# Patient Record
Sex: MALE | Race: WHITE | NOT HISPANIC OR LATINO
[De-identification: names, ages, dates, MRNs, and addresses within clinical notes are randomized per-mention and may not be internally consistent; named-entity substitution may affect disease eponyms.]

---

## 2021-01-02 ENCOUNTER — TRANSCRIPTION ENCOUNTER (OUTPATIENT)
Age: 56
End: 2021-01-02

## 2021-09-29 ENCOUNTER — TRANSCRIPTION ENCOUNTER (OUTPATIENT)
Age: 56
End: 2021-09-29

## 2021-11-23 ENCOUNTER — INPATIENT (INPATIENT)
Facility: HOSPITAL | Age: 56
LOS: 1 days | Discharge: HOME | DRG: 432 | End: 2021-11-25
Attending: HOSPITALIST | Admitting: HOSPITALIST
Payer: COMMERCIAL

## 2021-11-23 VITALS — HEIGHT: 74 IN | WEIGHT: 270.07 LBS

## 2021-11-23 DIAGNOSIS — R09.89 OTHER SPECIFIED SYMPTOMS AND SIGNS INVOLVING THE CIRCULATORY AND RESPIRATORY SYSTEMS: ICD-10-CM

## 2021-11-23 DIAGNOSIS — R18.8 OTHER ASCITES: ICD-10-CM

## 2021-11-23 LAB
ALBUMIN SERPL ELPH-MCNC: 2.4 G/DL — LOW (ref 3.3–5)
ALP SERPL-CCNC: 170 U/L — HIGH (ref 40–120)
ALT FLD-CCNC: 45 U/L — SIGNIFICANT CHANGE UP (ref 12–78)
ANION GAP SERPL CALC-SCNC: 5 MMOL/L — SIGNIFICANT CHANGE UP (ref 5–17)
APTT BLD: 39.7 SEC — HIGH (ref 27.5–35.5)
AST SERPL-CCNC: 74 U/L — HIGH (ref 15–37)
BASOPHILS # BLD AUTO: 0 K/UL — SIGNIFICANT CHANGE UP (ref 0–0.2)
BASOPHILS NFR BLD AUTO: 0 % — SIGNIFICANT CHANGE UP (ref 0–2)
BILIRUB SERPL-MCNC: 3.6 MG/DL — HIGH (ref 0.2–1.2)
BUN SERPL-MCNC: 18 MG/DL — SIGNIFICANT CHANGE UP (ref 7–23)
CALCIUM SERPL-MCNC: 8 MG/DL — LOW (ref 8.5–10.1)
CHLORIDE SERPL-SCNC: 108 MMOL/L — SIGNIFICANT CHANGE UP (ref 96–108)
CO2 SERPL-SCNC: 28 MMOL/L — SIGNIFICANT CHANGE UP (ref 22–31)
CREAT SERPL-MCNC: 0.83 MG/DL — SIGNIFICANT CHANGE UP (ref 0.5–1.3)
EOSINOPHIL # BLD AUTO: 0 K/UL — SIGNIFICANT CHANGE UP (ref 0–0.5)
EOSINOPHIL NFR BLD AUTO: 0 % — SIGNIFICANT CHANGE UP (ref 0–6)
GLUCOSE SERPL-MCNC: 145 MG/DL — HIGH (ref 70–99)
HCT VFR BLD CALC: 40.9 % — SIGNIFICANT CHANGE UP (ref 39–50)
HGB BLD-MCNC: 14.1 G/DL — SIGNIFICANT CHANGE UP (ref 13–17)
INR BLD: 1.72 RATIO — HIGH (ref 0.88–1.16)
LYMPHOCYTES # BLD AUTO: 1.37 K/UL — SIGNIFICANT CHANGE UP (ref 1–3.3)
LYMPHOCYTES # BLD AUTO: 28 % — SIGNIFICANT CHANGE UP (ref 13–44)
MCHC RBC-ENTMCNC: 34.5 GM/DL — SIGNIFICANT CHANGE UP (ref 32–36)
MCHC RBC-ENTMCNC: 34.6 PG — HIGH (ref 27–34)
MCV RBC AUTO: 100.5 FL — HIGH (ref 80–100)
MONOCYTES # BLD AUTO: 0.24 K/UL — SIGNIFICANT CHANGE UP (ref 0–0.9)
MONOCYTES NFR BLD AUTO: 5 % — SIGNIFICANT CHANGE UP (ref 2–14)
NEUTROPHILS # BLD AUTO: 3.27 K/UL — SIGNIFICANT CHANGE UP (ref 1.8–7.4)
NEUTROPHILS NFR BLD AUTO: 67 % — SIGNIFICANT CHANGE UP (ref 43–77)
NRBC # BLD: SIGNIFICANT CHANGE UP /100 WBCS (ref 0–0)
PLATELET # BLD AUTO: 77 K/UL — LOW (ref 150–400)
POTASSIUM SERPL-MCNC: 3.8 MMOL/L — SIGNIFICANT CHANGE UP (ref 3.5–5.3)
POTASSIUM SERPL-SCNC: 3.8 MMOL/L — SIGNIFICANT CHANGE UP (ref 3.5–5.3)
PROT SERPL-MCNC: 6.7 GM/DL — SIGNIFICANT CHANGE UP (ref 6–8.3)
PROTHROM AB SERPL-ACNC: 19.4 SEC — HIGH (ref 10.6–13.6)
RBC # BLD: 4.07 M/UL — LOW (ref 4.2–5.8)
RBC # FLD: 15.2 % — HIGH (ref 10.3–14.5)
SARS-COV-2 RNA SPEC QL NAA+PROBE: SIGNIFICANT CHANGE UP
SODIUM SERPL-SCNC: 141 MMOL/L — SIGNIFICANT CHANGE UP (ref 135–145)
TROPONIN I, HIGH SENSITIVITY RESULT: 30.02 NG/L — SIGNIFICANT CHANGE UP
WBC # BLD: 4.88 K/UL — SIGNIFICANT CHANGE UP (ref 3.8–10.5)
WBC # FLD AUTO: 4.88 K/UL — SIGNIFICANT CHANGE UP (ref 3.8–10.5)

## 2021-11-23 PROCEDURE — 82728 ASSAY OF FERRITIN: CPT

## 2021-11-23 PROCEDURE — 74183 MRI ABD W/O CNTR FLWD CNTR: CPT

## 2021-11-23 PROCEDURE — 36415 COLL VENOUS BLD VENIPUNCTURE: CPT

## 2021-11-23 PROCEDURE — 99223 1ST HOSP IP/OBS HIGH 75: CPT

## 2021-11-23 PROCEDURE — 86039 ANTINUCLEAR ANTIBODIES (ANA): CPT

## 2021-11-23 PROCEDURE — 80053 COMPREHEN METABOLIC PANEL: CPT

## 2021-11-23 PROCEDURE — 83550 IRON BINDING TEST: CPT

## 2021-11-23 PROCEDURE — 99285 EMERGENCY DEPT VISIT HI MDM: CPT

## 2021-11-23 PROCEDURE — 87075 CULTR BACTERIA EXCEPT BLOOD: CPT

## 2021-11-23 PROCEDURE — 84134 ASSAY OF PREALBUMIN: CPT

## 2021-11-23 PROCEDURE — 49083 ABD PARACENTESIS W/IMAGING: CPT

## 2021-11-23 PROCEDURE — 87102 FUNGUS ISOLATION CULTURE: CPT

## 2021-11-23 PROCEDURE — 93306 TTE W/DOPPLER COMPLETE: CPT

## 2021-11-23 PROCEDURE — 93010 ELECTROCARDIOGRAM REPORT: CPT

## 2021-11-23 PROCEDURE — A9579: CPT

## 2021-11-23 PROCEDURE — 83516 IMMUNOASSAY NONANTIBODY: CPT

## 2021-11-23 PROCEDURE — 93975 VASCULAR STUDY: CPT | Mod: 26

## 2021-11-23 PROCEDURE — 74177 CT ABD & PELVIS W/CONTRAST: CPT | Mod: 26,MA

## 2021-11-23 PROCEDURE — 82107 ALPHA-FETOPROTEIN L3: CPT

## 2021-11-23 PROCEDURE — 93975 VASCULAR STUDY: CPT

## 2021-11-23 PROCEDURE — 71045 X-RAY EXAM CHEST 1 VIEW: CPT | Mod: 26

## 2021-11-23 PROCEDURE — 83540 ASSAY OF IRON: CPT

## 2021-11-23 PROCEDURE — 80061 LIPID PANEL: CPT

## 2021-11-23 PROCEDURE — 83520 IMMUNOASSAY QUANT NOS NONAB: CPT

## 2021-11-23 PROCEDURE — C1729: CPT

## 2021-11-23 PROCEDURE — 85027 COMPLETE CBC AUTOMATED: CPT

## 2021-11-23 PROCEDURE — 86255 FLUORESCENT ANTIBODY SCREEN: CPT

## 2021-11-23 PROCEDURE — 87070 CULTURE OTHR SPECIMN AEROBIC: CPT

## 2021-11-23 PROCEDURE — 86803 HEPATITIS C AB TEST: CPT

## 2021-11-23 PROCEDURE — 86769 SARS-COV-2 COVID-19 ANTIBODY: CPT

## 2021-11-23 RX ORDER — ACETAMINOPHEN 500 MG
650 TABLET ORAL EVERY 6 HOURS
Refills: 0 | Status: DISCONTINUED | OUTPATIENT
Start: 2021-11-23 | End: 2021-11-25

## 2021-11-23 RX ORDER — ONDANSETRON 8 MG/1
4 TABLET, FILM COATED ORAL EVERY 8 HOURS
Refills: 0 | Status: DISCONTINUED | OUTPATIENT
Start: 2021-11-23 | End: 2021-11-25

## 2021-11-23 RX ORDER — FUROSEMIDE 40 MG
40 TABLET ORAL DAILY
Refills: 0 | Status: DISCONTINUED | OUTPATIENT
Start: 2021-11-23 | End: 2021-11-24

## 2021-11-23 RX ORDER — LANOLIN ALCOHOL/MO/W.PET/CERES
3 CREAM (GRAM) TOPICAL AT BEDTIME
Refills: 0 | Status: DISCONTINUED | OUTPATIENT
Start: 2021-11-23 | End: 2021-11-25

## 2021-11-23 RX ADMIN — Medication 40 MILLIGRAM(S): at 19:11

## 2021-11-23 NOTE — PATIENT PROFILE ADULT - NSPROMEDSADMININFO_GEN_A_NUR
----- Message from Km Mcpherson MD sent at 5/7/2019 12:33 PM CDT -----  Call. CMP normal. INR low, I know he was just instructed to restart the warfarin yesterday. Recheck again in 1 week.   no concerns

## 2021-11-23 NOTE — PHARMACOTHERAPY INTERVENTION NOTE - COMMENTS
Medication reconciliation completed.  Reviewed Medication list and confirmed med allergies with patient; confirmed with Dr. [facility].  Pt confirms he takes no medication at home.

## 2021-11-23 NOTE — ED STATDOCS - NS_ ATTENDINGSCRIBEDETAILS _ED_A_ED_FT
I, Ludy Courtney MD, performed the initial face to face bedside interview with this patient regarding history of present illness and determined that the patient should be seen in the main ED.  The history, was documented by the scribe in my presence and I attest to the accuracy of the documentation.

## 2021-11-23 NOTE — H&P ADULT - HISTORY OF PRESENT ILLNESS
57 yo M with pmh alcoholic fatty liver, h/o drinking beer on a daily basis for last 30 years presents from Dr millan office after he endorsed abdominal distention, thigh high edema bilaterally, 40 pound wt gain in one month, scrotal edema and sob. PT states he was told to abstain from drinking after he was told he had hepatosplenomegaly back in August of 2020. Last drink was 10/31/21. Denies fevers or chills. Afebrile in ED. No leukocytosis. No abd pain however endorses fatigue. Scleral icterus noted, states noticed this change 1 day ago. No pruritis. No h/o AI disease in patient or first degree relatives.   CTAP in ED showed large ascites with moderate L pleural effusion and questionable partial thrombosis of portal vein. LFTS noted  Denies h/o ivda or transfusions. No tattoos        PAST MEDICAL/SURGICAL/FAMILY/SOCIAL HISTORY:    Past Medical, Past Surgical, and Family History:  PAST MEDICAL HISTORY:  Fatty liver.  ETOH abuse    Shx: none  Social: h/o of drinking beer daily for 30 + years. No tobacco. Smokes MJ  Fhx: no fhx of liver disease

## 2021-11-23 NOTE — ED ADULT NURSE NOTE - NS ED NURSE REPORT GIVEN DT
Detail Level: Zone 23-Nov-2021 22:03 Quality 226: Preventive Care And Screening: Tobacco Use: Screening And Cessation Intervention: Tobacco Screening not Performed for Unknown Reasons

## 2021-11-23 NOTE — ED STATDOCS - PROGRESS NOTE DETAILS
Erwin Reddy for attending Dr. Courtney: 55 y/o male with no significant PMHx presents to the ED sent by Dr. Melendez for unintentional weight gain and possible paracentesis. Pt also with EKG changes. Denies SOB, weakness. No other complaints at this time. Will send pt to main ED for further evaluation.

## 2021-11-23 NOTE — ED ADULT NURSE NOTE - OBJECTIVE STATEMENT
Pt presents to ED, A&Ox4, ambulatory w/o assistance at baseline, sent by MD for abnormal lab values and weight gain. Pt states he has noticed increased abdominal distension and b/l leg swelling [id number]. Abdomen currently distended and nontender to touch. Denies chest pain, shortness of breath, palpitations, diaphoresis, headaches, fevers, dizziness, nausea, vomiting, diarrhea, or urinary symptoms at this time. IV established in right arm with a 20G, labs drawn and sent, call bell in reach, warm blanket provided, bed in lowest position, side rails up x2, MD evaluation in progress. Will continue to monitor.

## 2021-11-23 NOTE — H&P ADULT - NSHPPHYSICALEXAM_GEN_ALL_CORE
ICU Vital Signs Last 24 Hrs  T(C): 36.4 (23 Nov 2021 15:27), Max: 36.7 (23 Nov 2021 13:49)  T(F): 97.6 (23 Nov 2021 15:27), Max: 98.1 (23 Nov 2021 13:49)  HR: 88 (23 Nov 2021 15:27) (88 - 98)  BP: 154/78 (23 Nov 2021 15:27) (136/95 - 154/78)  BP(mean): 108 (23 Nov 2021 13:49) (108 - 108)  ABP: --  ABP(mean): --  RR: 16 (23 Nov 2021 15:27) (16 - 18)  SpO2: 100% (23 Nov 2021 15:27) (100% - 100%)      PHYSICAL EXAM:    Constitutional: NAD, awake and alert  HEENT: PERR, EOMI, Normal Hearing, MMM  Neck: Soft and supple, No LAD, No JVD  Respiratory: decreased BS to left lower field   Cardiovascular: S1 and S2, regular rate and rhythm, no Murmurs, gallops or rubs  Gastrointestinal: Bowel Sounds present, tense ascites with no fluid shift;   Extremities: +3 pitting thigh high edema with chronic venous changes b/l . + scrotal edema  Vascular: 2+ peripheral pulses  Neurological: A/O x 3, no focal deficits  Musculoskeletal: 5/5 strength b/l upper and lower extremities  Skin: No rashes

## 2021-11-23 NOTE — H&P ADULT - NSHPREVIEWOFSYSTEMS_GEN_ALL_CORE
REVIEW OF SYSTEMS:    CONSTITUTIONAL: No weakness, fevers or chills. + fatigue  EYES/ENT: No visual changes;  No vertigo or throat pain   NECK: No pain or stiffness  RESPIRATORY: No cough, wheezing, hemoptysis; No shortness of breath  CARDIOVASCULAR: No chest pain or palpitations  GASTROINTESTINAL: +abd distention. No nausea, vomiting, or hematemesis; No diarrhea or constipation. No melena or hematochezia.  GENITOURINARY: No dysuria, frequency or hematuria  NEUROLOGICAL: No numbness or weakness  SKIN: No itching, burning, rashes, or lesions ; + LE edema  All other review of systems is negative unless indicated above

## 2021-11-23 NOTE — ED PROVIDER NOTE - CARE PLAN
1 Principal Discharge DX:	Ascites  Secondary Diagnosis:	Transaminitis  Secondary Diagnosis:	Pleural effusion

## 2021-11-23 NOTE — ED ADULT NURSE REASSESSMENT NOTE - NS ED NURSE REASSESS COMMENT FT1
Pt. received from RAÚL Mata.  Pt. resting comfortably in bed, with no complaints at this time.  Awaiting covid result and admission. VSS.

## 2021-11-23 NOTE — H&P ADULT - ASSESSMENT
#Ascites  #Moderate Left pleural effusion  #Transaminitis/Jaundice  #ETOH abuse  #Thrombocytopenia  -admit to [facility]  -IR/GI consult for tense ascites requiring drainage  -Start lasix 40 ivp  -check prealbumin  -Hep panel, ASCA, anti-mitochondrial abs, KARLEY, indirect bili  -2D echo  -daily weights  -strict I/Os  -if no improvement of effusion with diuresis, may need thoracentesis    #?Portal vein thrombosis  -check Hep panel  -dedicated duplex of hepatic veins  -hold a/c for now until further GI eval is rendered (ie rule out any e/o esophageal varices)      DVT px: no chemical prophylaxis due to thrombocytopenia  Full code

## 2021-11-23 NOTE — ED PROVIDER NOTE - CLINICAL SUMMARY MEDICAL DECISION MAKING FREE TEXT BOX
Pt with large volume abdominal ascites, bilateral pleural effusion, question of partial occlusion portal vein, recommending eval with MRI or US.  US order was placed to eval for possible portal vein thrombosis.  Admit to medicine service for GI consult in AM.

## 2021-11-23 NOTE — H&P ADULT - NSHPLABSRESULTS_GEN_ALL_CORE
LABS: All Labs Reviewed:                        14.1   4.88  )-----------( 77       ( 23 Nov 2021 15:17 )             40.9     11-23    141  |  108  |  18  ----------------------------<  145<H>  3.8   |  28  |  0.83    Ca    8.0<L>      23 Nov 2021 15:17    TPro  6.7  /  Alb  2.4<L>  /  TBili  3.6<H>  /  DBili  x   /  AST  74<H>  /  ALT  45  /  AlkPhos  170<H>  11-23    PT/INR - ( 23 Nov 2021 15:17 )   PT: 19.4 sec;   INR: 1.72 ratio         PTT - ( 23 Nov 2021 15:17 )  PTT:39.7 sec          Blood Culture:         < from: CT Abdomen and Pelvis w/ IV Cont (11.23.21 @ 16:12) >    IMPRESSION:  Large abdominal ascites, moderate left pleural effusion and small right pleural effusion.  Suspected at least partial thrombosis of the proximal portal vein. Recommend further evaluation with MRI or ultrasound.    Findings were discussed with Dr. Lang 11/23/2021 4:26 PM by Dr. Diggs with read back confirmation.    --- End of Report ---    < end of copied text >

## 2021-11-23 NOTE — ED PROVIDER NOTE - OBJECTIVE STATEMENT
57 y/o M with a PMHx of alcoholic fatty liver presents to the ED from Dr. Melendez's office for abd distension and fatigue x October 2021. pt had an ultrasound and CT scan of abd in August 2020 that showed a fatty liver hepatomegaly and splenomegaly but no signs of cirrhosis. Pt had blood work around that time that showed elevated liver enzymes and thrombocytopenia . States that he started titrating his drinking down and stopped drinking  1 month ago. Denies any other complaints besides fatigue and abd distension.

## 2021-11-24 LAB
ALBUMIN SERPL ELPH-MCNC: 2 G/DL — LOW (ref 3.3–5)
ALP SERPL-CCNC: 141 U/L — HIGH (ref 40–120)
ALT FLD-CCNC: 35 U/L — SIGNIFICANT CHANGE UP (ref 12–78)
ANION GAP SERPL CALC-SCNC: 7 MMOL/L — SIGNIFICANT CHANGE UP (ref 5–17)
AST SERPL-CCNC: 61 U/L — HIGH (ref 15–37)
BAKER'S YEAST IGA QN IA: 33.7 UNITS — HIGH
BAKER'S YEAST IGA QN IA: ABNORMAL
BAKER'S YEAST IGG QN IA: 34.3 UNITS — HIGH
BAKER'S YEAST IGG QN IA: ABNORMAL
BILIRUB SERPL-MCNC: 3.1 MG/DL — HIGH (ref 0.2–1.2)
BUN SERPL-MCNC: 16 MG/DL — SIGNIFICANT CHANGE UP (ref 7–23)
CALCIUM SERPL-MCNC: 7.8 MG/DL — LOW (ref 8.5–10.1)
CHLORIDE SERPL-SCNC: 110 MMOL/L — HIGH (ref 96–108)
CHOLEST SERPL-MCNC: 174 MG/DL — SIGNIFICANT CHANGE UP
CO2 SERPL-SCNC: 25 MMOL/L — SIGNIFICANT CHANGE UP (ref 22–31)
COVID-19 NUCLEOCAPSID GAM AB INTERP: POSITIVE
COVID-19 NUCLEOCAPSID TOTAL GAM ANTIBODY RESULT: 15.3 INDEX — HIGH
COVID-19 SPIKE DOMAIN AB INTERP: POSITIVE
COVID-19 SPIKE DOMAIN ANTIBODY RESULT: >250 U/ML — HIGH
CREAT SERPL-MCNC: 0.57 MG/DL — SIGNIFICANT CHANGE UP (ref 0.5–1.3)
FERRITIN SERPL-MCNC: 623 NG/ML — HIGH (ref 30–400)
GLUCOSE SERPL-MCNC: 86 MG/DL — SIGNIFICANT CHANGE UP (ref 70–99)
GRAM STN FLD: SIGNIFICANT CHANGE UP
HAV IGM SER-ACNC: SIGNIFICANT CHANGE UP
HBV CORE IGM SER-ACNC: SIGNIFICANT CHANGE UP
HBV SURFACE AG SER-ACNC: SIGNIFICANT CHANGE UP
HCT VFR BLD CALC: 34.7 % — LOW (ref 39–50)
HCV AB S/CO SERPL IA: 0.3 S/CO — SIGNIFICANT CHANGE UP (ref 0–0.99)
HCV AB S/CO SERPL IA: 0.38 S/CO — SIGNIFICANT CHANGE UP (ref 0–0.99)
HCV AB SERPL-IMP: SIGNIFICANT CHANGE UP
HCV AB SERPL-IMP: SIGNIFICANT CHANGE UP
HDLC SERPL-MCNC: 51 MG/DL — SIGNIFICANT CHANGE UP
HGB BLD-MCNC: 12.1 G/DL — LOW (ref 13–17)
IRON SATN MFR SERPL: 117 UG/DL — SIGNIFICANT CHANGE UP (ref 45–165)
IRON SATN MFR SERPL: SIGNIFICANT CHANGE UP % (ref 16–55)
LIPID PNL WITH DIRECT LDL SERPL: 110 MG/DL — HIGH
MCHC RBC-ENTMCNC: 33.7 PG — SIGNIFICANT CHANGE UP (ref 27–34)
MCHC RBC-ENTMCNC: 34.9 GM/DL — SIGNIFICANT CHANGE UP (ref 32–36)
MCV RBC AUTO: 96.7 FL — SIGNIFICANT CHANGE UP (ref 80–100)
MITOCHONDRIA AB SER-ACNC: SIGNIFICANT CHANGE UP
MYELOPEROXIDASE AB SER-ACNC: <5 UNITS — SIGNIFICANT CHANGE UP
MYELOPEROXIDASE CELLS FLD QL: NEGATIVE — SIGNIFICANT CHANGE UP
NON HDL CHOLESTEROL: 123 MG/DL — SIGNIFICANT CHANGE UP
PLATELET # BLD AUTO: 69 K/UL — LOW (ref 150–400)
POTASSIUM SERPL-MCNC: 3.7 MMOL/L — SIGNIFICANT CHANGE UP (ref 3.5–5.3)
POTASSIUM SERPL-SCNC: 3.7 MMOL/L — SIGNIFICANT CHANGE UP (ref 3.5–5.3)
PREALB SERPL-MCNC: 10 MG/DL — LOW (ref 20–40)
PROT SERPL-MCNC: 5.5 GM/DL — LOW (ref 6–8.3)
RBC # BLD: 3.59 M/UL — LOW (ref 4.2–5.8)
RBC # FLD: 15.1 % — HIGH (ref 10.3–14.5)
SARS-COV-2 IGG+IGM SERPL QL IA: 15.3 INDEX — HIGH
SARS-COV-2 IGG+IGM SERPL QL IA: >250 U/ML — HIGH
SARS-COV-2 IGG+IGM SERPL QL IA: POSITIVE
SARS-COV-2 IGG+IGM SERPL QL IA: POSITIVE
SODIUM SERPL-SCNC: 142 MMOL/L — SIGNIFICANT CHANGE UP (ref 135–145)
SPECIMEN SOURCE: SIGNIFICANT CHANGE UP
TIBC SERPL-MCNC: SIGNIFICANT CHANGE UP UG/DL (ref 220–430)
TRIGL SERPL-MCNC: 65 MG/DL — SIGNIFICANT CHANGE UP
UIBC SERPL-MCNC: <20 UG/DL — LOW (ref 110–370)
WBC # BLD: 4.92 K/UL — SIGNIFICANT CHANGE UP (ref 3.8–10.5)
WBC # FLD AUTO: 4.92 K/UL — SIGNIFICANT CHANGE UP (ref 3.8–10.5)

## 2021-11-24 PROCEDURE — 93306 TTE W/DOPPLER COMPLETE: CPT | Mod: 26

## 2021-11-24 PROCEDURE — 99233 SBSQ HOSP IP/OBS HIGH 50: CPT

## 2021-11-24 RX ORDER — SPIRONOLACTONE 25 MG/1
100 TABLET, FILM COATED ORAL DAILY
Refills: 0 | Status: DISCONTINUED | OUTPATIENT
Start: 2021-11-25 | End: 2021-11-25

## 2021-11-24 RX ORDER — FUROSEMIDE 40 MG
40 TABLET ORAL DAILY
Refills: 0 | Status: DISCONTINUED | OUTPATIENT
Start: 2021-11-25 | End: 2021-11-25

## 2021-11-24 RX ADMIN — Medication 40 MILLIGRAM(S): at 09:08

## 2021-11-24 NOTE — CONSULT NOTE ADULT - ASSESSMENT
56-year-old man with alcoholic liver disease, cirrhosis, portal hypertension and ascites.  He does not currently seem to have significant hepatic encephalopathy.      Recommendations    -Follow-up ascites fluid studies  -Start Lasix 40 mg by mouth daily and Spironolactone 100 mg by mouth daily tomorrow  -2 g sodium diet  -Check alpha-fetoprotein  -f/u remaining serologies for liver disease  -Follow-up MRI of the liver to rule out portal vein thrombosis and evaluate for HCC  -He will need outpatient upper endoscopy to evaluate for varices  -He will need outpatient colonoscopy for screening purposes  -Alcohol cessation was discussed  -Will follow

## 2021-11-24 NOTE — CONSULT NOTE ADULT - ASSESSMENT
57yo M with ascites/left pleural effusion referred to IR for paracentesis  - Plan for IR paracentesis today  - Spoke with Dr. Carver/thoracic. If pt still SOB following paracentesis, may need drainage of pleural effusion or continued diuresis

## 2021-11-24 NOTE — CONSULT NOTE ADULT - ASSESSMENT
56y old Male with PMH alcoholic fatty liver, h/o drinking beer on a daily basis for last 30 years presented with ascities and pleural effusion  1. ascites seen by IR. will have therapeutic paracentesis  -continue lasix. may also need spironolactone  -GI consult  2. ? portal vein thrombosis: ultrasound of hepatic veins inconclusive and now will need MRI  -no anticoagulation in light of upcoming paracentesis  3. thrombocytopenia: most likely secondary to liver failure  4. pleural effusion: receiving lasix  -repeat cxr after paracentesis  -if effusion persists, will need thoracentesis    discussed with dr erazo 56y old Male with PMH alcoholic fatty liver, h/o drinking beer on a daily basis for last 30 years presented with ascities and pleural effusion  1. ascites seen by IR. will have therapeutic paracentesis  -continue lasix. may also need spironolactone  -GI consult  2. ? portal vein thrombosis: ultrasound of hepatic veins inconclusive and now will need MRI  -no anticoagulation in light of upcoming paracentesis  3. thrombocytopenia: most likely secondary to liver failure  4. pleural effusions: bilateral, more so on left. suggests systemic issues like portal htn rather than unilateral issue like cancer  - receiving lasix  -repeat cxr after paracentesis  -if effusion persists, will need thoracentesis    discussed with dr erazo

## 2021-11-24 NOTE — CONSULT NOTE ADULT - CONSULT REASON
57yo M with ascites/left pleural effusion referred to IR for paracentesis
pleural effusion
cirrhosis

## 2021-11-24 NOTE — CONSULT NOTE ADULT - SUBJECTIVE AND OBJECTIVE BOX
GI consult    HPI:      55 yo M with pmh alcoholic fatty liver, h/o drinking beer on a daily basis for last 30 years presents from Dr millan office after he endorsed abdominal distention, thigh high edema bilaterally, 40 pound wt gain in one month, scrotal edema and sob. PT states he was told to abstain from drinking after he was told he had hepatosplenomegaly back in August of 2020. Last drink was 10/31/21. Denies fevers or chills. Afebrile in ED. No leukocytosis. No abd pain however endorses fatigue. Scleral icterus noted, states noticed this change 1 day ago. No pruritis. No h/o AI disease in patient or first degree relatives.   CTAP in ED showed large ascites with moderate L pleural effusion and questionable partial thrombosis of portal vein. LFTS noted  Denies h/o ivda or transfusions. No tattoos    Called to evaluate patient for liver disease.  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  He was told he had liver disease in the past but stopped drinking only about 3 weeks ago or so.  He reports rapid weight gain recently and abdominal distention.  He was sent to the hospital for further evaluation by his primary doctor.  He denies fever, nausea, vomiting, abdominal pain, diarrhea or constipation.  There is no rectal bleeding.  He has not had a colonoscopy or endoscopy in the past.  He does have complaints of fatigue.  On imaging he was found to have a cirrhotic liver and ascites, with possible portal vein thrombosis.  He is pending MRI.  He underwent paracentesis today.    PAST MEDICAL/SURGICAL/FAMILY/SOCIAL HISTORY:    Past Medical, Past Surgical, and Family History:  PAST MEDICAL HISTORY:  Fatty liver.  ETOH abuse    Shx: none  Social: h/o of drinking beer daily for 30 + years. No tobacco. Smokes MJ  Fhx: no fhx of liver disease (23 Nov 2021 17:53)      Home Medications:  Fluzone Quadrivalent [id number] intramuscular suspension: 0.5 milliliter(s) intramuscular once  ***Dose given in October 2021*** (23 Nov 2021 17:00)  Pfizer-BioNTech COVID-19 Vaccine PF 30 mcg/0.3 mL intramuscular suspension: 0.3 milliliter(s) intramuscular once  ***3rd dose given in October 2021*** (23 Nov 2021 17:00)      MEDICATIONS  (STANDING):  furosemide   Injectable 40 milliGRAM(s) IV Push daily    MEDICATIONS  (PRN):  acetaminophen     Tablet .. 650 milliGRAM(s) Oral every 6 hours PRN Temp greater or equal to 38C (100.4F), Mild Pain (1 - 3)  aluminum hydroxide/magnesium hydroxide/simethicone Suspension 30 milliLiter(s) Oral every 4 hours PRN Dyspepsia  melatonin 3 milliGRAM(s) Oral at bedtime PRN Insomnia  ondansetron Injectable 4 milliGRAM(s) IV Push every 8 hours PRN Nausea and/or Vomiting      Allergies    No Known Allergies    Intolerances    ROS  As above  Otherwise unremarkable, all systems reviewed    PE:  Vital Signs Last 24 Hrs  T(C): 36.9 (24 Nov 2021 16:14), Max: 36.9 (23 Nov 2021 21:55)  T(F): 98.4 (24 Nov 2021 16:14), Max: 98.5 (23 Nov 2021 21:55)  HR: 83 (24 Nov 2021 16:14) (83 - 101)  BP: 127/73 (24 Nov 2021 16:14) (116/67 - 137/85)  BP(mean): 87 (23 Nov 2021 21:55) (87 - 87)  RR: 17 (24 Nov 2021 16:14) (16 - 18)  SpO2: 95% (24 Nov 2021 16:14) (93% - 100%)    Constitutional: NAD, well-developed, A+Ox3  mild icterus  +spider angiomata  no asterixis  Respiratory: CTABL, breathing comfortably  Cardiovascular: S1 and S2, RRR  Gastrointestinal: +BS, soft, non tender, moderately distended, no mass  Extremities: warm, well perfused, 2+ bilat LE edema with chronic skin changes  Psychiatric: Normal mood, normal affect  Neuro: moves all extremities, grossly intact  Skin: No rashes or lesions    LABS:                        12.1   4.92  )-----------( 69       ( 24 Nov 2021 05:35 )             34.7     11-24    142  |  110<H>  |  16  ----------------------------<  86  3.7   |  25  |  0.57    Ca    7.8<L>      24 Nov 2021 05:35    TPro  5.5<L>  /  Alb  2.0<L>  /  TBili  3.1<H>  /  DBili  x   /  AST  61<H>  /  ALT  35  /  AlkPhos  141<H>  11-24    PT/INR - ( 23 Nov 2021 15:17 )   PT: 19.4 sec;   INR: 1.72 ratio         PTT - ( 23 Nov 2021 15:17 )  PTT:39.7 sec  LIVER FUNCTIONS - ( 24 Nov 2021 05:35 )  Alb: 2.0 g/dL / Pro: 5.5 gm/dL / ALK PHOS: 141 U/L / ALT: 35 U/L / AST: 61 U/L / GGT: x             RADIOLOGY & ADDITIONAL STUDIES:  CT and US reviewed
Chief complaint:  Patient is a 56y old  Male who presents with a chief complaint of abd distention and wt ggain     HPI:  57 yo M with pmh alcoholic fatty liver, h/o drinking beer on a daily basis for last 30 years presents from Dr millan office after he endorsed abdominal distention, thigh high edema bilaterally, 40 pound wt gain in one month, scrotal edema and sob. IR consulted for paracentesis.    Allergies  No Known Allergies    MEDICATIONS  (STANDING):  furosemide   Injectable 40 milliGRAM(s) IV Push daily    MEDICATIONS  (PRN):  acetaminophen     Tablet .. 650 milliGRAM(s) Oral every 6 hours PRN Temp greater or equal to 38C (100.4F), Mild Pain (1 - 3)  aluminum hydroxide/magnesium hydroxide/simethicone Suspension 30 milliLiter(s) Oral every 4 hours PRN Dyspepsia  melatonin 3 milliGRAM(s) Oral at bedtime PRN Insomnia  ondansetron Injectable 4 milliGRAM(s) IV Push every 8 hours PRN Nausea and/or Vomiting      PAST MEDICAL & SURGICAL HISTORY:  Fatty liver    Vital Signs Last 24 Hrs  T(C): 36.7 (24 Nov 2021 08:11), Max: 36.9 (23 Nov 2021 21:55)  T(F): 98 (24 Nov 2021 08:11), Max: 98.5 (23 Nov 2021 21:55)  HR: 96 (24 Nov 2021 08:11) (84 - 101)  BP: 116/67 (24 Nov 2021 08:11) (116/67 - 154/78)  BP(mean): 87 (23 Nov 2021 21:55) (87 - 108)  RR: 18 (24 Nov 2021 08:11) (15 - 18)  SpO2: 93% (24 Nov 2021 08:11) (93% - 100%)    CBC                        12.1   4.92  )-----------( 69       ( 24 Nov 2021 05:35 )             34.7       Chemistry  11-24    142  |  110<H>  |  16  ----------------------------<  86  3.7   |  25  |  0.57    Ca    7.8<L>      24 Nov 2021 05:35    TPro  5.5<L>  /  Alb  2.0<L>  /  TBili  3.1<H>  /  DBili  x   /  AST  61<H>  /  ALT  35  /  AlkPhos  141<H>  11-24    Coags  PT/INR - ( 23 Nov 2021 15:17 )   PT: 19.4 sec;   INR: 1.72 ratio    PTT - ( 23 Nov 2021 15:17 )  PTT:39.7 sec    
HPI: MARIMAR SMART is a 56y old Male with PMH alcoholic fatty liver, h/o drinking beer on a daily basis for last 30 years presented on 11/23  from Dr millan office after he endorsed abdominal distention, thigh high edema bilaterally, 40 pound wt gain in one month, scrotal edema and sob. PT states he was told to abstain from drinking after he was told he had hepatosplenomegaly back in August of 2020. Hi s last drink was 10/31/21. Denies fevers or chills. Afebrile in ED. No leukocytosis. No abd pain however endorses fatigue.   CTAP in ED showed large ascites with moderate L pleural effusion and questionable partial thrombosis of portal vein. LFTS noted  Denies h/o ivda or transfusions. No tattoos    Past Medical History:   Fatty liver      Past Surgical History:     Family History:       Social History: last drink reported 10/31/21    Review of Systems:  All 10 systems reviewed in detailed and found to be negative with the exception of what has already been described above    Allergies:  No Known Allergies    Meds  MEDICATIONS  (STANDING):  furosemide   Injectable 40 milliGRAM(s) IV Push daily    MEDICATIONS  (PRN):  acetaminophen     Tablet .. 650 milliGRAM(s) Oral every 6 hours PRN Temp greater or equal to 38C (100.4F), Mild Pain (1 - 3)  aluminum hydroxide/magnesium hydroxide/simethicone Suspension 30 milliLiter(s) Oral every 4 hours PRN Dyspepsia  melatonin 3 milliGRAM(s) Oral at bedtime PRN Insomnia  ondansetron Injectable 4 milliGRAM(s) IV Push every 8 hours PRN Nausea and/or Vomiting    Physical Exam  T(C): 36.7 (11-24-21 @ 08:11), Max: 36.9 (11-23-21 @ 21:55)  HR: 96 (11-24-21 @ 08:11) (84 - 101)  BP: 116/67 (11-24-21 @ 08:11) (116/67 - 154/78)  RR: 18 (11-24-21 @ 08:11) (15 - 18)  SpO2: 93% (11-24-21 @ 08:11) (93% - 100%)  Gen: Alert, oriented, no distress  HEENT: + icteric sclera  Cardio: tachy  Resp: decreased breath sounds on left  GI: large ascites  Ext: + edema  Neuro: Nonfocal    Labs:                        12.1   4.92  )-----------( 69       ( 24 Nov 2021 05:35 )             34.7     11-24    142  |  110<H>  |  16  ----------------------------<  86  3.7   |  25  |  0.57    Ca    7.8<L>      24 Nov 2021 05:35    TPro  5.5<L>  /  Alb  2.0<L>  /  TBili  3.1<H>  /  DBili  x   /  AST  61<H>  /  ALT  35  /  AlkPhos  141<H>  11-24    PT/INR - ( 23 Nov 2021 15:17 )   PT: 19.4 sec;   INR: 1.72 ratio         PTT - ( 23 Nov 2021 15:17 )  PTT:39.7 sec    < from: CT Abdomen and Pelvis w/ IV Cont (11.23.21 @ 16:12) >  PROCEDURE DATE:  11/23/2021          INTERPRETATION:  CLINICAL INFORMATION: Abdominal distention.    COMPARISON: None.    CONTRAST/COMPLICATIONS:  IV Contrast: Omnipaque 350  90 cc administered   10 cc discarded  Oral Contrast: NONE  Complications: None reported at time of study completion    PROCEDURE:  CT of the Abdomen and Pelvis was performed.  Sagittal and coronal reformats were performed.    FINDINGS:  LOWER CHEST: Moderate left pleural effusion and small right pleural effusion with bibasilar atelectasis. Small pericardial effusion. Coronary calcifications.    LIVER: Within normal limits.  BILE DUCTS: Normal caliber.  GALLBLADDER: Cholelithiasis.  SPLEEN: Top normal in size.  PANCREAS: Within normal limits.  ADRENALS: Within normal limits.  KIDNEYS/URETERS: Within normal limits.    BLADDER: Within normal limits.  REPRODUCTIVE ORGANS: Normal-sized prostate.    BOWEL: No bowel obstruction. Appendix is normal. Colonic diverticulosis.  PERITONEUM: Large ascites.  VESSELS: Atherosclerotic changes. Suspected at least partial thrombosis of the proximal portal vein near the splenic confluence (example 2, 30).  RETROPERITONEUM/LYMPH NODES: No lymphadenopathy.  ABDOMINAL WALL: Small bilateral fat-containing inguinal hernias. Subcutaneous edema of the abdominal wall.  BONES: Degenerative changes.    IMPRESSION:  Large abdominal ascites, moderate left pleural effusion and small right pleural effusion.  Suspected at least partial thrombosis of the proximal portal vein. Recommend further evaluation with MRI or ultrasound.    < from: US Doppler Portal/Hepatic Vein (11.23.21 @ 18:42) >  EXAM:  US DPLX PORTAL HEPATIC VEIN                            PROCEDURE DATE:  11/23/2021          INTERPRETATION:  CLINICAL INFORMATION: Suggestion of nonocclusive thrombus in the proximal portal vein on prior CT.    COMPARISON: Abdominal CT dated11/23/2021    TECHNIQUE:  Hepatic Doppler evaluation.    FINDINGS:    The liver is nodular in contour consistent with cirrhosis.    There is moderate amount of ascites.    Hepatic evaluation demonstrate patent portal vein with normal directional flow. The central portion of the portal vein near the confluence at the site of the questionable thrombus is not well visualized.    The hepatic veins and splenic vein are patent with normal directional flow.    IMPRESSION: ,  The central portion of the portal vein at this confluence at the site of the questionable thrombus on the prior CT is not well visualized. Given the findings on the prior CT, further evaluation with contrast enhanced MRI or CT venogram is recommended.    Cirrhosis and ascites    cxr personally reviewed 11/23/21  left sided effusion

## 2021-11-25 ENCOUNTER — TRANSCRIPTION ENCOUNTER (OUTPATIENT)
Age: 56
End: 2021-11-25

## 2021-11-25 VITALS
HEART RATE: 92 BPM | TEMPERATURE: 98 F | RESPIRATION RATE: 19 BRPM | SYSTOLIC BLOOD PRESSURE: 126 MMHG | DIASTOLIC BLOOD PRESSURE: 81 MMHG | OXYGEN SATURATION: 94 %

## 2021-11-25 LAB
ALBUMIN SERPL ELPH-MCNC: 2.2 G/DL — LOW (ref 3.3–5)
ALP SERPL-CCNC: 134 U/L — HIGH (ref 40–120)
ALT FLD-CCNC: 35 U/L — SIGNIFICANT CHANGE UP (ref 12–78)
ANION GAP SERPL CALC-SCNC: 7 MMOL/L — SIGNIFICANT CHANGE UP (ref 5–17)
AST SERPL-CCNC: 59 U/L — HIGH (ref 15–37)
BILIRUB SERPL-MCNC: 3.2 MG/DL — HIGH (ref 0.2–1.2)
BUN SERPL-MCNC: 14 MG/DL — SIGNIFICANT CHANGE UP (ref 7–23)
CALCIUM SERPL-MCNC: 7.8 MG/DL — LOW (ref 8.5–10.1)
CHLORIDE SERPL-SCNC: 107 MMOL/L — SIGNIFICANT CHANGE UP (ref 96–108)
CO2 SERPL-SCNC: 26 MMOL/L — SIGNIFICANT CHANGE UP (ref 22–31)
CREAT SERPL-MCNC: 0.61 MG/DL — SIGNIFICANT CHANGE UP (ref 0.5–1.3)
GLUCOSE SERPL-MCNC: 85 MG/DL — SIGNIFICANT CHANGE UP (ref 70–99)
INR BLD: 1.8 RATIO — HIGH (ref 0.88–1.16)
MELD SCORE WITH DIALYSIS: 31 POINTS — SIGNIFICANT CHANGE UP
MELD SCORE WITHOUT DIALYSIS: 17 POINTS — SIGNIFICANT CHANGE UP
POTASSIUM SERPL-MCNC: 3.6 MMOL/L — SIGNIFICANT CHANGE UP (ref 3.5–5.3)
POTASSIUM SERPL-SCNC: 3.6 MMOL/L — SIGNIFICANT CHANGE UP (ref 3.5–5.3)
PROT SERPL-MCNC: 5.6 GM/DL — LOW (ref 6–8.3)
PROTHROM AB SERPL-ACNC: 20.3 SEC — HIGH (ref 10.6–13.6)
SODIUM SERPL-SCNC: 140 MMOL/L — SIGNIFICANT CHANGE UP (ref 135–145)

## 2021-11-25 PROCEDURE — 74183 MRI ABD W/O CNTR FLWD CNTR: CPT | Mod: 26

## 2021-11-25 PROCEDURE — 99231 SBSQ HOSP IP/OBS SF/LOW 25: CPT

## 2021-11-25 PROCEDURE — 99239 HOSP IP/OBS DSCHRG MGMT >30: CPT

## 2021-11-25 RX ORDER — BNT162B2 0.23 MG/2.25ML
0.3 INJECTION, SUSPENSION INTRAMUSCULAR
Qty: 0 | Refills: 0 | DISCHARGE

## 2021-11-25 RX ORDER — FUROSEMIDE 40 MG
1 TABLET ORAL
Qty: 30 | Refills: 0
Start: 2021-11-25 | End: 2021-12-24

## 2021-11-25 RX ORDER — SPIRONOLACTONE 25 MG/1
4 TABLET, FILM COATED ORAL
Qty: 120 | Refills: 0
Start: 2021-11-25 | End: 2021-12-24

## 2021-11-25 RX ORDER — INFLUENZA VIRUS VACCINE 15; 15; 15; 15 UG/.5ML; UG/.5ML; UG/.5ML; UG/.5ML
0.5 SUSPENSION INTRAMUSCULAR
Qty: 0 | Refills: 0 | DISCHARGE

## 2021-11-25 RX ADMIN — Medication 40 MILLIGRAM(S): at 10:26

## 2021-11-25 RX ADMIN — SPIRONOLACTONE 100 MILLIGRAM(S): 25 TABLET, FILM COATED ORAL at 10:26

## 2021-11-25 NOTE — PROVIDER CONTACT NOTE (OTHER) - SITUATION
patient was discharged and left building, I called his wife to let her know an appt needs to be made with cardiologist within 10 days. she said she will call one tomorrow
called md service spoke with Dashawn
called md service spoke with Dashawn

## 2021-11-25 NOTE — DISCHARGE NOTE PROVIDER - PROVIDER TOKENS
PROVIDER:[TOKEN:[[id number]]],PROVIDER:[TOKEN:[[id number]]],PROVIDER:[TOKEN:[[id number]]]

## 2021-11-25 NOTE — DISCHARGE NOTE PROVIDER - NPI NUMBER (FOR SYSADMIN USE ONLY) :
Please take an antihistamine, Allegra daily  Please take prednisone 20 mg by mouth daily for 1 week  You may stop if the rash improves or goes away  You may apply hydrocortisone cream over-the-counter on affected rashes as needed  Insect Bite or Sting   AMBULATORY CARE:   Most insect bites and stings  are not dangerous and go away without treatment  Common examples of insects that bite or sting are bees, ticks, mosquitoes, spiders, and ants  Insect bites or stings can lead to diseases such as malaria, West Nile virus, Lyme disease, or [place] Spotted Fever  Common signs and symptoms:   · Mild symptoms  include a red bump, pain, swelling, and itching  · Anaphylaxis symptoms  include throat tightness, trouble breathing, tingling, dizziness, and wheezing  Anaphylaxis is a sudden, life-threatening reaction that needs immediate treatment  Call your local emergency number (911 in the [address] [place] [address],[address]) for signs or symptoms of anaphylaxis,  such as trouble breathing, swelling in your mouth or throat, or wheezing  You may also have itching, a rash, hives, or feel like you are going to faint  Seek care immediately if:   · You are stung on your tongue or in your throat  · A white area forms around the bite  · You are sweating badly or have body pain  · You think you were bitten or stung by a poisonous insect  Call your doctor if:   · You have a fever  · The area becomes red, warm, tender, and swollen beyond the area of the bite or sting  · You have questions or concerns about your condition or care  Steps to take for signs or symptoms of anaphylaxis:   · Immediately  give 1 shot of epinephrine only into the outer thigh muscle  · Leave the shot in place  as directed  Your healthcare provider may recommend you leave it in place for up to 10 seconds before you remove it  This helps make sure all of the epinephrine is delivered      · Call 911 and go to the emergency department,  even if the shot improved symptoms  Do not drive yourself  Bring the used epinephrine shot with you  Treatment  depends on how severe your symptoms are and if you had anaphylaxis before  You may need any of the following:  · Antihistamines  decrease mild symptoms such as itching and rash  · Epinephrine  is medicine used to treat severe allergic reactions such as anaphylaxis  · A tetanus shot  may be given  The shot is a vaccine that helps prevent a bacterial infection  Tetanus booster shots are usually given every 10 years  If an insect bites or stings you:   · Remove the stinger  Scrape the stinger out with your fingernail, edge of a credit card, or a knife blade  Do not squeeze the wound  Gently wash the area with soap and water  · Remove the tick  Ticks must be removed as soon as possible so you do not get diseases passed through tick bites  Ask your healthcare provider for more information on tick bites and how to remove ticks  Care for your bite or sting wound:   · Elevate (raise) the area above the level of your heart, if possible  Prop the area on pillows to keep it raised comfortably  Elevate the area for 10 to 20 minutes each hour or as directed by your healthcare provider  · Apply compresses  Soak a clean washcloth in cold water, wring it out, and put it on the bite or sting  Use the compress for 10 to 20 minutes each hour or as directed by your healthcare provider  After 24 to 48 hours, change to warm compresses  · Apply a baking soda paste  Add water to baking soda to make a thick paste  Put the paste on the area for 5 minutes  Rinse gently to remove the paste  Safety precautions to take if you are at risk for anaphylaxis:   · Keep 2 shots of epinephrine with you at all times  You may need a second shot, because epinephrine only works for about 20 minutes and symptoms may return  Your healthcare provider can show you and family members how to give the shot   Check the expiration date every month and replace it before it expires  · Create an action plan  Your healthcare provider can help you create a written plan that explains the allergy and an emergency plan to treat a reaction  The plan explains when to give a second epinephrine shot if symptoms return or do not improve after the first  Give copies of the action plan and emergency instructions to family members, work and school staff, and  providers  Show them how to give a shot of epinephrine  · Carry medical alert identification  Wear medical alert jewelry or carry a card that says you have an insect allergy  Ask your healthcare provider where to get these items  Prevent an insect bite or sting:   · Do not wear bright-colored or flower-print clothing when you plan to spend time outdoors  Do not use hairspray, perfumes, or aftershave  · Do not leave food out  · Empty any standing water  Wash containers with soap and water every 2 days  · Put screens on all open windows and doors  · Put insect repellent that contains DEET on skin that is showing when you go outside  Put insect repellent at the top of your boots, bottom of pant legs, and sleeve cuffs  Wear long sleeves, pants, and shoes  · Use citronella candles outdoors to help keep mosquitoes away  Put a tick and flea collar on pets  Follow up with your doctor as directed:  Write down your questions so you remember to ask them during your visits  © Copyright [employer] 2021 Information is for End User's use only and may not be sold, redistributed or otherwise used for commercial purposes  All illustrations and images included in CareNotes® are the copyrighted property of A [employer]  or Jesús Mcelroy   The above information is an  only  It is not intended as medical advice for individual conditions or treatments   Talk to your doctor, nurse or pharmacist before following any medical regimen to see if it is safe and effective for you  [[id number]],[[id number]],[[id number]]

## 2021-11-25 NOTE — PROGRESS NOTE ADULT - ASSESSMENT
#Large Ascites 2/2 to decompensated cirrhosis  #Moderate Left pleural effusion  #Transaminitis/Jaundice  #ETOH abuse  #Thrombocytopenia  -plan for paracentesis +/-albumin if large volume para  -Monitor effusion for now aas may equilibrate post paracentesis; furthermore pt without symptoms ie sob  -Start lasix 40 ivp  -check prealbumin  -Hep panel, ASCA, anti-mitochondrial abs, KARLEY, indirect bili  -2D echo  -daily weights  -strict I/Os      #?Portal vein thrombosis  -check Hep panel  -dedicated duplex of hepatic veins: thrombosis still not ruled out -> MRI with contrast  -hold a/c for now until further GI eval is rendered (ie rule out any e/o esophageal varices)      DVT px: no chemical prophylaxis due to thrombocytopenia  Full code      D/W IR, pulm and nursing
53yo male with etoh liver disease    await MRI liver, ?portal vein thrombosis  if negative, potential d/c  will need egd, colonoscopy  outpt f/u with Dr Maya
56y old Male with PMH alcoholic fatty liver, h/o drinking beer on a daily basis for last 30 years presented with ascities and pleural effusion  1. ascites  s/p paracentesis  -continue lasix and spironolactone  -GI following  2. ? portal vein thrombosis: ultrasound of hepatic veins inconclusive, awaiting mri results  3. thrombocytopenia: most likely secondary to liver failure  4. pleural effusions: bilateral, more so on left. suggests systemic issues like portal htn rather than unilateral issue like cancer  - receiving lasix and spironolactone  -repeat cxr after paracentesis  -if effusion persists, will need thoracentesis. can evaluate as outpatient    discussed with dr sanchez

## 2021-11-25 NOTE — DISCHARGE NOTE PROVIDER - NSDCPNSUBOBJ_GEN_ALL_CORE
55 yo M with pmh alcoholic fatty liver, h/o drinking beer on a daily basis for last 30 years presents from Dr millan office after he endorsed abdominal distention, thigh high edema bilaterally, 40 pound wt gain in one month, scrotal edema and sob. PT states he was told to abstain from drinking after he was told he had hepatosplenomegaly back in August of 2020. Last drink was 10/31/21. Denies fevers or chills. Afebrile in ED. No leukocytosis. No abd pain however endorses fatigue. Scleral icterus noted, states noticed this change 1 day ago. No pruritis. No h/o AI disease in patient or first degree relatives.   CTAP in ED showed large ascites with moderate L pleural effusion and questionable partial thrombosis of portal vein. LFTS noted    pt admitted and underwent paracentesis and seen by GI.  PT had 7.3 liters removed and given 2 units albumin post paracentesis.  pt underwent MRI and case d/w radiology and GI dr reardon and given likely chronic appearance of partial portal vein thrombosis plan to dc without anticoagulation until EGD done to r/o varices. o2 sat on ambulation 92% on RA    Vital Signs Last 24 Hrs  T(C): 36.8 (25 Nov 2021 08:26), Max: 37 (24 Nov 2021 22:50)  T(F): 98.2 (25 Nov 2021 08:26), Max: 98.6 (24 Nov 2021 22:50)  HR: 92 (25 Nov 2021 08:26) (80 - 92)  BP: 126/81 (25 Nov 2021 08:26) (120/68 - 127/73)  BP(mean): --  RR: 19 (25 Nov 2021 08:26) (16 - 19)  SpO2: 94% (25 Nov 2021 08:26) (94% - 97%)    GEN: lying in bed, NAD  HEENT:   NC/AT, pupils equal and reactive, EOMI  CV:  +S1, +S2, RRR  RESP:   lungs clear to auscultation bilaterally, no wheeze, rales, rhonchi   BREAST:  not examined  GI:  abdomen soft, non-tender, +ascites   RECTAL:  not examined  :  not examined  MSK:   normal muscle tone  EXT:  no edema  NEURO:  AAOX3, no focal neurological deficits  SKIN:  no rashes    #Large Ascites 2/2 to decompensated cirrhosis  #Moderate Left pleural effusion  #Transaminitis/Jaundice  #ETOH abuse  #Thrombocytopenia  s/p paracentesis 7.3 liters out  - will need egd/colon as outpt he is aware and will f/u with dr de leon  - continue lasix and aldactone   - pending lab can be followed up by GI as outpt  -Hep panel, ASCA, anti-mitochondrial abs, KARLEY, indirect bili      #?Portal vein thrombosis - MR not very conclusive but based on my d/w radiology 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    dc home  time spent 40 mins

## 2021-11-25 NOTE — DISCHARGE NOTE PROVIDER - HOSPITAL COURSE
57 yo M with pmh alcoholic fatty liver, h/o drinking beer on a daily basis for last 30 years presents from Dr millan office after he endorsed abdominal distention, thigh high edema bilaterally, 40 pound wt gain in one month, scrotal edema and sob. PT states he was told to abstain from drinking after he was told he had hepatosplenomegaly back in August of 2020. Last drink was 10/31/21. Denies fevers or chills. Afebrile in ED. No leukocytosis. No abd pain however endorses fatigue. Scleral icterus noted, states noticed this change 1 day ago. No pruritis. No h/o AI disease in patient or first degree relatives.   CTAP in ED showed large ascites with moderate L pleural effusion and questionable partial thrombosis of portal vein. LFTS noted    pt admitted and underwent paracentesis and seen by GI.  PT had 7.3 liters removed and given 2 units albumin post paracentesis.  pt underwent MRI and case d/w radiology and GI dr reardon and given likely chronic appearance of partial portal vein thrombosis plan to dc without anticoagulation until EGD done to r/o varices. 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    dc home  time spent 40 mins

## 2021-11-25 NOTE — PROGRESS NOTE ADULT - SUBJECTIVE AND OBJECTIVE BOX
Patient is a 56y old  Male who presents with a chief complaint of abd distention and wt gain (24 Nov 2021 18:56)    HPI:  pt feeling ok today  no new complaints  wants to go home    PAST MEDICAL/SURGICAL/FAMILY/SOCIAL HISTORY:    Past Medical, Past Surgical, and Family History:  PAST MEDICAL HISTORY:  Fatty liver.  ETOH abuse  Shx: none  Social: h/o of drinking beer daily for 30 + years. No tobacco. Smokes MJ  Fhx: no fhx of liver disease (23 Nov 2021 17:53)    PAST MEDICAL & SURGICAL HISTORY:  Fatty liver    MEDICATIONS  (STANDING):  furosemide    Tablet 40 milliGRAM(s) Oral daily  spironolactone 100 milliGRAM(s) Oral daily    MEDICATIONS  (PRN):  acetaminophen     Tablet .. 650 milliGRAM(s) Oral every 6 hours PRN Temp greater or equal to 38C (100.4F), Mild Pain (1 - 3)  aluminum hydroxide/magnesium hydroxide/simethicone Suspension 30 milliLiter(s) Oral every 4 hours PRN Dyspepsia  melatonin 3 milliGRAM(s) Oral at bedtime PRN Insomnia  ondansetron Injectable 4 milliGRAM(s) IV Push every 8 hours PRN Nausea and/or Vomiting    Allergies  No Known Allergies    REVIEW OF SYSTEMS:    CONSTITUTIONAL: No weakness, fevers or chills  RESPIRATORY: No cough, wheezing, hemoptysis; No shortness of breath  CARDIOVASCULAR: No chest pain or palpitations  GASTROINTESTINAL: No abdominal or epigastric pain. No nausea, vomiting, or hematemesis; No diarrhea or constipation. No melena or hematochezia.  All other review of systems is negative unless indicated above.    Vital Signs Last 24 Hrs  T(C): 37 (24 Nov 2021 22:50), Max: 37 (24 Nov 2021 22:50)  T(F): 98.6 (24 Nov 2021 22:50), Max: 98.6 (24 Nov 2021 22:50)  HR: 80 (24 Nov 2021 22:50) (80 - 96)  BP: 120/68 (24 Nov 2021 22:50) (116/67 - 127/73)  BP(mean): --  RR: 16 (24 Nov 2021 22:50) (16 - 18)  SpO2: 97% (24 Nov 2021 22:50) (93% - 97%)    PHYSICAL EXAM:  Constitutional: NAD  Respiratory: CTA  Cardiovascular: S1 and S2  Gastrointestinal: BS+, soft, mildly distended      LABS:                        12.1   4.92  )-----------( 69       ( 24 Nov 2021 05:35 )             34.7     11-25    140  |  107  |  14  ----------------------------<  85  3.6   |  26  |  0.61    Ca    7.8<L>      25 Nov 2021 05:31    TPro  5.6<L>  /  Alb  2.2<L>  /  TBili  3.2<H>  /  DBili  x   /  AST  59<H>  /  ALT  35  /  AlkPhos  134<H>  11-25    PT/INR - ( 25 Nov 2021 05:31 )   PT: 20.3 sec;   INR: 1.80 ratio         PTT - ( 23 Nov 2021 15:17 )  PTT:39.7 sec  LIVER FUNCTIONS - ( 25 Nov 2021 05:31 )  Alb: 2.2 g/dL / Pro: 5.6 gm/dL / ALK PHOS: 134 U/L / ALT: 35 U/L / AST: 59 U/L / GGT: x             RADIOLOGY & ADDITIONAL STUDIES:
Subjective:  had 7330 cc of clear yellow ascites removed yesterday, given 2 U of 25% albumin  this morning feels better  denies sob  awaiting mri results    Review of Systems:  All 10 systems reviewed in detailed and found to be negative with the exception of what has already been described above    Allergies:  No Known Allergies    Meds  MEDICATIONS  (STANDING):  furosemide    Tablet 40 milliGRAM(s) Oral daily  spironolactone 100 milliGRAM(s) Oral daily    MEDICATIONS  (PRN):  acetaminophen     Tablet .. 650 milliGRAM(s) Oral every 6 hours PRN Temp greater or equal to 38C (100.4F), Mild Pain (1 - 3)  aluminum hydroxide/magnesium hydroxide/simethicone Suspension 30 milliLiter(s) Oral every 4 hours PRN Dyspepsia  melatonin 3 milliGRAM(s) Oral at bedtime PRN Insomnia  ondansetron Injectable 4 milliGRAM(s) IV Push every 8 hours PRN Nausea and/or Vomiting    Physical Exam  T(C): 36.8 (11-25-21 @ 08:26), Max: 37 (11-24-21 @ 22:50)  HR: 92 (11-25-21 @ 08:26) (80 - 92)  BP: 126/81 (11-25-21 @ 08:26) (120/68 - 127/73)  RR: 19 (11-25-21 @ 08:26) (16 - 19)  SpO2: 94% (11-25-21 @ 08:26) (94% - 97%)  Gen: Alert, oriented, no distress  HEENT: anicteric sclera  Cardio: tachy  Resp: decreased breath sounds on left  GI: large ascites  Ext: + edema  Neuro: Nonfocal    Labs:                        12.1   4.92  )-----------( 69       ( 24 Nov 2021 05:35 )             34.7     11-25    140  |  107  |  14  ----------------------------<  85  3.6   |  26  |  0.61    Ca    7.8<L>      25 Nov 2021 05:31    TPro  5.6<L>  /  Alb  2.2<L>  /  TBili  3.2<H>  /  DBili  x   /  AST  59<H>  /  ALT  35  /  AlkPhos  134<H>  11-25    PT/INR - ( 25 Nov 2021 05:31 )   PT: 20.3 sec;   INR: 1.80 ratio         PTT - ( 23 Nov 2021 15:17 )  PTT:39.7 sec    < from: CT Abdomen and Pelvis w/ IV Cont (11.23.21 @ 16:12) >  PROCEDURE DATE:  11/23/2021          INTERPRETATION:  CLINICAL INFORMATION: Abdominal distention.    COMPARISON: None.    CONTRAST/COMPLICATIONS:  IV Contrast: Omnipaque 350  90 cc administered   10 cc discarded  Oral Contrast: NONE  Complications: None reported at time of study completion    PROCEDURE:  CT of the Abdomen and Pelvis was performed.  Sagittal and coronal reformats were performed.    FINDINGS:  LOWER CHEST: Moderate left pleural effusion and small right pleural effusion with bibasilar atelectasis. Small pericardial effusion. Coronary calcifications.    LIVER: Within normal limits.  BILE DUCTS: Normal caliber.  GALLBLADDER: Cholelithiasis.  SPLEEN: Top normal in size.  PANCREAS: Within normal limits.  ADRENALS: Within normal limits.  KIDNEYS/URETERS: Within normal limits.    BLADDER: Within normal limits.  REPRODUCTIVE ORGANS: Normal-sized prostate.    BOWEL: No bowel obstruction. Appendix is normal. Colonic diverticulosis.  PERITONEUM: Large ascites.  VESSELS: Atherosclerotic changes. Suspected at least partial thrombosis of the proximal portal vein near the splenic confluence (example 2, 30).  RETROPERITONEUM/LYMPH NODES: No lymphadenopathy.  ABDOMINAL WALL: Small bilateral fat-containing inguinal hernias. Subcutaneous edema of the abdominal wall.  BONES: Degenerative changes.    IMPRESSION:  Large abdominal ascites, moderate left pleural effusion and small right pleural effusion.  Suspected at least partial thrombosis of the proximal portal vein. Recommend further evaluation with MRI or ultrasound.    < from: US Doppler Portal/Hepatic Vein (11.23.21 @ 18:42) >  EXAM:  US DPLX PORTAL HEPATIC VEIN                            PROCEDURE DATE:  11/23/2021          INTERPRETATION:  CLINICAL INFORMATION: Suggestion of nonocclusive thrombus in the proximal portal vein on prior CT.    COMPARISON: Abdominal CT dated11/23/2021    TECHNIQUE:  Hepatic Doppler evaluation.    FINDINGS:    The liver is nodular in contour consistent with cirrhosis.    There is moderate amount of ascites.    Hepatic evaluation demonstrate patent portal vein with normal directional flow. The central portion of the portal vein near the confluence at the site of the questionable thrombus is not well visualized.    The hepatic veins and splenic vein are patent with normal directional flow.    IMPRESSION: ,  The central portion of the portal vein at this confluence at the site of the questionable thrombus on the prior CT is not well visualized. Given the findings on the prior CT, further evaluation with contrast enhanced MRI or CT venogram is recommended.    Cirrhosis and ascites    cxr personally reviewed 11/23/21  left sided effusion
Patient is a 56y old  Male who presents with a chief complaint of abd distention and wt ggain (24 Nov 2021 09:33)      SUBJECTIVE:   HPI:      55 yo M with pmh alcoholic fatty liver, h/o drinking beer on a daily basis for last 30 years presents from Dr millan office after he endorsed abdominal distention, thigh high edema bilaterally, 40 pound wt gain in one month, scrotal edema and sob. PT states he was told to abstain from drinking after he was told he had hepatosplenomegaly back in August of 2020. Last drink was 10/31/21. Denies fevers or chills. Afebrile in ED. No leukocytosis. No abd pain however endorses fatigue. Scleral icterus noted, states noticed this change 1 day ago. No pruritis. No h/o AI disease in patient or first degree relatives.   CTAP in ED showed large ascites with moderate L pleural effusion and questionable partial thrombosis of portal vein. LFTS noted  Denies h/o ivda or transfusions. No tattoos    sub: 93% on RA. No sob. Plan for para today        PAST MEDICAL/SURGICAL/FAMILY/SOCIAL HISTORY:    Past Medical, Past Surgical, and Family History:  PAST MEDICAL HISTORY:  Fatty liver.  ETOH abuse    Shx: none  Social: h/o of drinking beer daily for 30 + years. No tobacco. Smokes MJ  Fhx: no fhx of liver disease (23 Nov 2021 17:53)        REVIEW OF SYSTEMS:    CONSTITUTIONAL: No weakness, fevers or chills  EYES/ENT: No visual changes;  No vertigo or throat pain   NECK: No pain or stiffness  RESPIRATORY: No cough, wheezing, hemoptysis; No shortness of breath  CARDIOVASCULAR: No chest pain or palpitations  GASTROINTESTINAL: No abdominal or epigastric pain. No nausea, vomiting, or hematemesis; No diarrhea or constipation. No melena or hematochezia.  GENITOURINARY: No dysuria, frequency or hematuria  NEUROLOGICAL: No numbness or weakness  SKIN: No itching, burning, rashes, or lesions   All other review of systems is negative unless indicated above    ICU Vital Signs Last 24 Hrs  T(C): 36.7 (24 Nov 2021 08:11), Max: 36.9 (23 Nov 2021 21:55)  T(F): 98 (24 Nov 2021 08:11), Max: 98.5 (23 Nov 2021 21:55)  HR: 96 (24 Nov 2021 08:11) (84 - 101)  BP: 116/67 (24 Nov 2021 08:11) (116/67 - 154/78)  BP(mean): 87 (23 Nov 2021 21:55) (87 - 108)  ABP: --  ABP(mean): --  RR: 18 (24 Nov 2021 08:11) (15 - 18)  SpO2: 93% (24 Nov 2021 08:11) (93% - 100%)  --  IN: 0 mL / OUT: 750 mL / NET: -750 mL    24 Nov 2021 07:01  -  24 Nov 2021 11:50  --------------------------------------------------------  IN: 0 mL / OUT: 660 mL / NET: -660 mL        CAPILLARY BLOOD GLUCOSE          PHYSICAL EXAM:    Constitutional: NAD, awake and alert,   HEENT: PERR, EOMI, Normal Hearing, MMM  Neck: Soft and supple, No LAD, No JVD  Respiratory: Breath sounds are clear bilaterally, No wheezing, rales or rhonchi  Cardiovascular: S1 and S2, regular rate and rhythm, no Murmurs, gallops or rubs  Gastrointestinal: Bowel Sounds present, tense ascites; no ttp  Extremities: No peripheral edema  Vascular: 2+ peripheral pulses  Neurological: A/O x 3, no focal deficits  Musculoskeletal: 5/5 strength b/l upper and lower extremities  Skin: No rashes    MEDICATIONS:  MEDICATIONS  (STANDING):  furosemide   Injectable 40 milliGRAM(s) IV Push daily      LABS: All Labs Reviewed:                        12.1   4.92  )-----------( 69       ( 24 Nov 2021 05:35 )             34.7     11-24    142  |  110<H>  |  16  ----------------------------<  86  3.7   |  25  |  0.57    Ca    7.8<L>      24 Nov 2021 05:35    TPro  5.5<L>  /  Alb  2.0<L>  /  TBili  3.1<H>  /  DBili  x   /  AST  61<H>  /  ALT  35  /  AlkPhos  141<H>  11-24    PT/INR - ( 23 Nov 2021 15:17 )   PT: 19.4 sec;   INR: 1.72 ratio         PTT - ( 23 Nov 2021 15:17 )  PTT:39.7 sec          Blood Culture:     RADIOLOGY/EKG: reviewed

## 2021-11-25 NOTE — DISCHARGE NOTE NURSING/CASE MANAGEMENT/SOCIAL WORK - PATIENT PORTAL LINK FT
You can access the FollowMyHealth Patient Portal offered by [facility] by registering at the following website: [email or web address]. By joining [email or web address]’s FollowMyHealth portal, you will also be able to view your health information using other applications (apps) compatible with our system.

## 2021-11-25 NOTE — DISCHARGE NOTE PROVIDER - NSDCCPCAREPLAN_GEN_ALL_CORE_FT
PRINCIPAL DISCHARGE DIAGNOSIS  Diagnosis: Ascites  Assessment and Plan of Treatment: follow up with your primary doctor and dr lr.  you will need EGD and colonoscopy as outpt and dr lr will schedule this.  please abstain from alcohol.    follow up with dr العراقي the lung doctor for follow up   truen tot he ER if fever chills, severe abdominal pain.         SECONDARY DISCHARGE DIAGNOSES  Diagnosis: Transaminitis  Assessment and Plan of Treatment:     Diagnosis: Pleural effusion  Assessment and Plan of Treatment:

## 2021-11-25 NOTE — DISCHARGE NOTE PROVIDER - NSDCMRMEDTOKEN_GEN_ALL_CORE_FT
furosemide 40 mg oral tablet: 1 tab(s) orally once a day  spironolactone 25 mg oral tablet: 4 tab(s) orally once a day

## 2021-11-25 NOTE — DISCHARGE NOTE PROVIDER - CARE PROVIDER_API CALL
Barry Melendez  [address] [address]  Phone: [phone]  Fax: [phone]  Follow Up Time:     Alverto Maya)  Gastroenterology; Internal Medicine  [address] [facility], [address]  [place] [address]  Phone: [phone]  Fax: [phone]  Follow Up Time:     Rene Cisneros)  Internal Medicine; Pulmonary Disease  [address] [place] [address]  Phone: [phone]  Fax: [phone]  Follow Up Time:

## 2021-11-25 NOTE — DISCHARGE NOTE NURSING/CASE MANAGEMENT/SOCIAL WORK - NSDCFUADDAPPT_GEN_ALL_CORE_FT
**Spoke with Patient's wife, he was already out of the building, and let her know to call cardiologist tomorrow for hospital appt within 10 days**

## 2021-11-26 LAB
ANA PAT FLD IF-IMP: ABNORMAL
ANA TITR SER: ABNORMAL

## 2021-11-29 LAB
ALPHA-1-FETOPROTEIN-L3: SIGNIFICANT CHANGE UP % (ref 0–9.9)
ALPHA-1-FETOPROTEIN: 5.8 NG/ML — SIGNIFICANT CHANGE UP (ref 0–8)
CULTURE RESULTS: SIGNIFICANT CHANGE UP
SPECIMEN SOURCE: SIGNIFICANT CHANGE UP

## 2021-11-30 LAB
AUTO DIFF PNL BLD: ABNORMAL
C-ANCA SER-ACNC: NEGATIVE — SIGNIFICANT CHANGE UP
P-ANCA SER-ACNC: NEGATIVE — SIGNIFICANT CHANGE UP

## 2021-11-30 PROCEDURE — 49083 ABD PARACENTESIS W/IMAGING: CPT

## 2021-12-01 DIAGNOSIS — K70.0 ALCOHOLIC FATTY LIVER: ICD-10-CM

## 2021-12-01 DIAGNOSIS — K70.31 ALCOHOLIC CIRRHOSIS OF LIVER WITH ASCITES: ICD-10-CM

## 2021-12-01 DIAGNOSIS — F10.10 ALCOHOL ABUSE, UNCOMPLICATED: ICD-10-CM

## 2021-12-01 DIAGNOSIS — I81 PORTAL VEIN THROMBOSIS: ICD-10-CM

## 2021-12-01 DIAGNOSIS — J90 PLEURAL EFFUSION, NOT ELSEWHERE CLASSIFIED: ICD-10-CM

## 2021-12-01 DIAGNOSIS — D69.59 OTHER SECONDARY THROMBOCYTOPENIA: ICD-10-CM

## 2021-12-10 ENCOUNTER — INPATIENT (INPATIENT)
Facility: HOSPITAL | Age: 56
LOS: 3 days | Discharge: HOME | DRG: 871 | End: 2021-12-14
Attending: HOSPITALIST | Admitting: INTERNAL MEDICINE
Payer: COMMERCIAL

## 2021-12-10 VITALS — HEIGHT: 74 IN | WEIGHT: 266.98 LBS

## 2021-12-10 DIAGNOSIS — Z98.890 OTHER SPECIFIED POSTPROCEDURAL STATES: Chronic | ICD-10-CM

## 2021-12-10 DIAGNOSIS — K70.31 ALCOHOLIC CIRRHOSIS OF LIVER WITH ASCITES: ICD-10-CM

## 2021-12-10 LAB
ALBUMIN SERPL ELPH-MCNC: 2.2 G/DL — LOW (ref 3.3–5)
ALP SERPL-CCNC: 150 U/L — HIGH (ref 40–120)
ALT FLD-CCNC: 40 U/L — SIGNIFICANT CHANGE UP (ref 12–78)
ANION GAP SERPL CALC-SCNC: 8 MMOL/L — SIGNIFICANT CHANGE UP (ref 5–17)
APTT BLD: 35.8 SEC — HIGH (ref 27.5–35.5)
AST SERPL-CCNC: 59 U/L — HIGH (ref 15–37)
BASOPHILS # BLD AUTO: 0 K/UL — SIGNIFICANT CHANGE UP (ref 0–0.2)
BASOPHILS NFR BLD AUTO: 0 % — SIGNIFICANT CHANGE UP (ref 0–2)
BILIRUB SERPL-MCNC: 6 MG/DL — HIGH (ref 0.2–1.2)
BUN SERPL-MCNC: 18 MG/DL — SIGNIFICANT CHANGE UP (ref 7–23)
CALCIUM SERPL-MCNC: 8.2 MG/DL — LOW (ref 8.5–10.1)
CHLORIDE SERPL-SCNC: 104 MMOL/L — SIGNIFICANT CHANGE UP (ref 96–108)
CO2 SERPL-SCNC: 25 MMOL/L — SIGNIFICANT CHANGE UP (ref 22–31)
CREAT SERPL-MCNC: 1 MG/DL — SIGNIFICANT CHANGE UP (ref 0.5–1.3)
EOSINOPHIL # BLD AUTO: 0 K/UL — SIGNIFICANT CHANGE UP (ref 0–0.5)
EOSINOPHIL NFR BLD AUTO: 0 % — SIGNIFICANT CHANGE UP (ref 0–6)
GLUCOSE SERPL-MCNC: 125 MG/DL — HIGH (ref 70–99)
HCT VFR BLD CALC: 38.7 % — LOW (ref 39–50)
HGB BLD-MCNC: 13.3 G/DL — SIGNIFICANT CHANGE UP (ref 13–17)
INR BLD: 1.65 RATIO — HIGH (ref 0.88–1.16)
LACTATE SERPL-SCNC: 5.1 MMOL/L — CRITICAL HIGH (ref 0.7–2)
LYMPHOCYTES # BLD AUTO: 0.52 K/UL — LOW (ref 1–3.3)
LYMPHOCYTES # BLD AUTO: 5 % — LOW (ref 13–44)
MCHC RBC-ENTMCNC: 34.1 PG — HIGH (ref 27–34)
MCHC RBC-ENTMCNC: 34.4 GM/DL — SIGNIFICANT CHANGE UP (ref 32–36)
MCV RBC AUTO: 99.2 FL — SIGNIFICANT CHANGE UP (ref 80–100)
MONOCYTES # BLD AUTO: 0.41 K/UL — SIGNIFICANT CHANGE UP (ref 0–0.9)
MONOCYTES NFR BLD AUTO: 4 % — SIGNIFICANT CHANGE UP (ref 2–14)
NEUTROPHILS # BLD AUTO: 9.37 K/UL — HIGH (ref 1.8–7.4)
NEUTROPHILS NFR BLD AUTO: 91 % — HIGH (ref 43–77)
NRBC # BLD: SIGNIFICANT CHANGE UP /100 WBCS (ref 0–0)
PLATELET # BLD AUTO: 81 K/UL — LOW (ref 150–400)
POTASSIUM SERPL-MCNC: 4.3 MMOL/L — SIGNIFICANT CHANGE UP (ref 3.5–5.3)
POTASSIUM SERPL-SCNC: 4.3 MMOL/L — SIGNIFICANT CHANGE UP (ref 3.5–5.3)
PROT SERPL-MCNC: 6.6 GM/DL — SIGNIFICANT CHANGE UP (ref 6–8.3)
PROTHROM AB SERPL-ACNC: 18.7 SEC — HIGH (ref 10.6–13.6)
RBC # BLD: 3.9 M/UL — LOW (ref 4.2–5.8)
RBC # FLD: 15.5 % — HIGH (ref 10.3–14.5)
SARS-COV-2 RNA SPEC QL NAA+PROBE: SIGNIFICANT CHANGE UP
SODIUM SERPL-SCNC: 137 MMOL/L — SIGNIFICANT CHANGE UP (ref 135–145)
WBC # BLD: 10.3 K/UL — SIGNIFICANT CHANGE UP (ref 3.8–10.5)
WBC # FLD AUTO: 10.3 K/UL — SIGNIFICANT CHANGE UP (ref 3.8–10.5)

## 2021-12-10 PROCEDURE — 93010 ELECTROCARDIOGRAM REPORT: CPT

## 2021-12-10 PROCEDURE — 85730 THROMBOPLASTIN TIME PARTIAL: CPT

## 2021-12-10 PROCEDURE — 93306 TTE W/DOPPLER COMPLETE: CPT

## 2021-12-10 PROCEDURE — 84134 ASSAY OF PREALBUMIN: CPT

## 2021-12-10 PROCEDURE — 80202 ASSAY OF VANCOMYCIN: CPT

## 2021-12-10 PROCEDURE — 84100 ASSAY OF PHOSPHORUS: CPT

## 2021-12-10 PROCEDURE — C1729: CPT

## 2021-12-10 PROCEDURE — 71045 X-RAY EXAM CHEST 1 VIEW: CPT | Mod: 26

## 2021-12-10 PROCEDURE — 36415 COLL VENOUS BLD VENIPUNCTURE: CPT

## 2021-12-10 PROCEDURE — 83735 ASSAY OF MAGNESIUM: CPT

## 2021-12-10 PROCEDURE — 87040 BLOOD CULTURE FOR BACTERIA: CPT

## 2021-12-10 PROCEDURE — 71260 CT THORAX DX C+: CPT | Mod: 26,MD

## 2021-12-10 PROCEDURE — 81003 URINALYSIS AUTO W/O SCOPE: CPT

## 2021-12-10 PROCEDURE — 74177 CT ABD & PELVIS W/CONTRAST: CPT | Mod: 26,MA

## 2021-12-10 PROCEDURE — 87075 CULTR BACTERIA EXCEPT BLOOD: CPT

## 2021-12-10 PROCEDURE — 80053 COMPREHEN METABOLIC PANEL: CPT

## 2021-12-10 PROCEDURE — 87070 CULTURE OTHR SPECIMN AEROBIC: CPT

## 2021-12-10 PROCEDURE — 85025 COMPLETE CBC W/AUTO DIFF WBC: CPT

## 2021-12-10 PROCEDURE — 49083 ABD PARACENTESIS W/IMAGING: CPT

## 2021-12-10 PROCEDURE — 87102 FUNGUS ISOLATION CULTURE: CPT

## 2021-12-10 PROCEDURE — 87635 SARS-COV-2 COVID-19 AMP PRB: CPT

## 2021-12-10 PROCEDURE — 85610 PROTHROMBIN TIME: CPT

## 2021-12-10 PROCEDURE — 83605 ASSAY OF LACTIC ACID: CPT

## 2021-12-10 PROCEDURE — 99291 CRITICAL CARE FIRST HOUR: CPT

## 2021-12-10 PROCEDURE — 99223 1ST HOSP IP/OBS HIGH 75: CPT

## 2021-12-10 RX ORDER — IBUPROFEN 200 MG
600 TABLET ORAL ONCE
Refills: 0 | Status: COMPLETED | OUTPATIENT
Start: 2021-12-10 | End: 2021-12-10

## 2021-12-10 RX ORDER — ACETAMINOPHEN 500 MG
650 TABLET ORAL EVERY 6 HOURS
Refills: 0 | Status: DISCONTINUED | OUTPATIENT
Start: 2021-12-10 | End: 2021-12-14

## 2021-12-10 RX ORDER — VANCOMYCIN HCL 1 G
1000 VIAL (EA) INTRAVENOUS ONCE
Refills: 0 | Status: DISCONTINUED | OUTPATIENT
Start: 2021-12-10 | End: 2021-12-10

## 2021-12-10 RX ORDER — SPIRONOLACTONE 25 MG/1
100 TABLET, FILM COATED ORAL DAILY
Refills: 0 | Status: DISCONTINUED | OUTPATIENT
Start: 2021-12-10 | End: 2021-12-11

## 2021-12-10 RX ORDER — CEFTRIAXONE 500 MG/1
2000 INJECTION, POWDER, FOR SOLUTION INTRAMUSCULAR; INTRAVENOUS ONCE
Refills: 0 | Status: COMPLETED | OUTPATIENT
Start: 2021-12-10 | End: 2021-12-10

## 2021-12-10 RX ORDER — SODIUM CHLORIDE 9 MG/ML
1000 INJECTION INTRAMUSCULAR; INTRAVENOUS; SUBCUTANEOUS ONCE
Refills: 0 | Status: COMPLETED | OUTPATIENT
Start: 2021-12-10 | End: 2021-12-10

## 2021-12-10 RX ORDER — ONDANSETRON 8 MG/1
4 TABLET, FILM COATED ORAL EVERY 8 HOURS
Refills: 0 | Status: DISCONTINUED | OUTPATIENT
Start: 2021-12-10 | End: 2021-12-14

## 2021-12-10 RX ORDER — FUROSEMIDE 40 MG
40 TABLET ORAL DAILY
Refills: 0 | Status: DISCONTINUED | OUTPATIENT
Start: 2021-12-10 | End: 2021-12-11

## 2021-12-10 RX ORDER — VANCOMYCIN HCL 1 G
1750 VIAL (EA) INTRAVENOUS ONCE
Refills: 0 | Status: COMPLETED | OUTPATIENT
Start: 2021-12-10 | End: 2021-12-10

## 2021-12-10 RX ADMIN — CEFTRIAXONE 100 MILLIGRAM(S): 500 INJECTION, POWDER, FOR SOLUTION INTRAMUSCULAR; INTRAVENOUS at 14:10

## 2021-12-10 RX ADMIN — SODIUM CHLORIDE 1000 MILLILITER(S): 9 INJECTION INTRAMUSCULAR; INTRAVENOUS; SUBCUTANEOUS at 15:21

## 2021-12-10 RX ADMIN — Medication 600 MILLIGRAM(S): at 14:10

## 2021-12-10 RX ADMIN — Medication 600 MILLIGRAM(S): at 20:11

## 2021-12-10 RX ADMIN — CEFTRIAXONE 2000 MILLIGRAM(S): 500 INJECTION, POWDER, FOR SOLUTION INTRAMUSCULAR; INTRAVENOUS at 14:40

## 2021-12-10 RX ADMIN — SODIUM CHLORIDE 1000 MILLILITER(S): 9 INJECTION INTRAMUSCULAR; INTRAVENOUS; SUBCUTANEOUS at 16:21

## 2021-12-10 RX ADMIN — Medication 250 MILLIGRAM(S): at 16:47

## 2021-12-10 NOTE — ED ADULT NURSE NOTE - NSIMPLEMENTINTERV_GEN_ALL_ED
Implemented All Universal Safety Interventions:  [place] to call system. Call bell, personal items and telephone within reach. Instruct patient to call for assistance. Room bathroom lighting operational. Non-slip footwear when patient is off stretcher. Physically safe environment: no spills, clutter or unnecessary equipment. Stretcher in lowest position, wheels locked, appropriate side rails in place.

## 2021-12-10 NOTE — H&P ADULT - NSHPPHYSICALEXAM_GEN_ALL_CORE
Vital Signs Last 24 Hrs  T(C): 36.8 (10 Dec 2021 13:33), Max: 36.8 (10 Dec 2021 13:33)  T(F): 98.2 (10 Dec 2021 13:33), Max: 98.2 (10 Dec 2021 13:33)  HR: 48 (10 Dec 2021 13:33) (48 - 48)  BP: 140/83 (10 Dec 2021 13:33) (140/83 - 140/83)  RR: 18 (10 Dec 2021 13:33) (18 - 18)  SpO2: 92% (10 Dec 2021 13:33) (92% - 92%)

## 2021-12-10 NOTE — H&P ADULT - NSHPSOCIALHISTORY_GEN_ALL_CORE
Lives at home  History of drinking beer daily for 30+ years. Last drink was on 10/31/2021.   Denies tobacco.  (+)Marijuana use

## 2021-12-10 NOTE — ED ADULT TRIAGE NOTE - CHIEF COMPLAINT QUOTE
sent in by dr. lr for ascites and abdominal pain started intermittently and worse yesterday, patient had paracentesis 1 month ago, patient has history of cirrhosis, patient currently has blood clot in liver preventing fluid flow

## 2021-12-10 NOTE — ED PROVIDER NOTE - PROGRESS NOTE DETAILS
Spoke with TAINA DOZIER who states the pt needs a rapid covid and labs and that they may be able to take him today but not guaranteed due to the amount of procedures they have today. -Jama Meneses PA-C 55 yo male with a PMH of alcoholic cirrhosis, ascites, ?HLD presents with abd pain and increased in ascites since yetserday. Pt had a recent paracentesis last month and saw Dr. Maya today in the office for increased abd pain and distension. Pt was sent in for paracentesis and for MRI/endoscopy for possible portal vein thrombosis. Pt with mild SOB with exertion. Lungs CTA with decreased BS to the LLB. Will check labs, covid, cxr, ekg, reeval. -Jama Meneses PA-C Lactate 5.1. Ordered 1L of IVF not the 30cc/kg due to pt with worsening ascites and +LL pleural effusion and states he is SOB. -Jama Meneses PA-C Alan BUTT:  I, Mayi Phillips DO,  performed the initial face to face bedside interview with this patient regarding history of present illness, review of symptoms and relevant past medical, social and family history.  I completed an independent physical examination.  I was the initial provider who evaluated this patient. I have signed out the follow up of any pending tests (i.e. labs, radiological studies) to the ACP.  I have communicated the patient’s plan of care and disposition with the ACP.  The history, relevant review of systems, past medical and surgical history, medical decision making, and physical examination was documented by the scribe in my presence and I attest to the accuracy of the documentation.    S/o to Dr. Fernandez pending CT scans and admission. Spoke with Dr. Maya. Aware of the labs results and pts vitals. States pt will need a paracentesis. Called back to IR. States they are coming to get the pt for his paracentesis. -Jama Meneses PA-C

## 2021-12-10 NOTE — PHARMACOTHERAPY INTERVENTION NOTE - COMMENTS
Medication reconciliation completed.  Reviewed Medication list and confirmed med allergies with patient; confirmed with Dr. First Medmalissa.
As per hospital policy, vancomycin 1000mg changed to 1750mg based on first dose weight based dosing.

## 2021-12-10 NOTE — ED PROVIDER NOTE - CARE PLAN
1 Principal Discharge DX:	Alcoholic cirrhosis of liver with ascites  Secondary Diagnosis:	Pleural effusion  Secondary Diagnosis:	Abdominal pain

## 2021-12-10 NOTE — H&P ADULT - NSICDXPASTMEDICALHX_GEN_ALL_CORE_FT
PAST MEDICAL HISTORY:  Alcoholic fatty liver     Cirrhosis of liver with ascites     ETOH abuse     Thrombocytopenia concurrent with and due to alcoholism

## 2021-12-10 NOTE — ED ADULT NURSE REASSESSMENT NOTE - NS ED NURSE REASSESS COMMENT FT1
pt resting comfortably in bed with no acute distress noted. vss. Dinner served. Will cont to monitor for safety and comfort.

## 2021-12-10 NOTE — ED PROVIDER NOTE - OBJECTIVE STATEMENT
55 y/o male with PMHx of fatty liver and cirrhosis presents to the ED c/o abdominal pain and increased distension. Pt reports he saw Dr. Maya today who sent him to the ED for ascites drainage. Pt has new onset SUAREZ. +Former EtOH, last drink 10/31/21.   PCP: Dr. Melendez 55 y/o male with PMHx of fatty liver and cirrhosis presents to the ED c/o abdominal pain and increased distension. Pt reports he saw Dr. Maya today who sent him to the ED for paracentesis; Pt has new onset SUAREZ. +Former EtOH, last drink 10/31/21.   PCP: Dr. Melendez

## 2021-12-10 NOTE — H&P ADULT - ASSESSMENT
55 y/o M PMHx significant for alcoholic fatty liver, history of drinking beer on a daily basis for last 30 years (last drink was reportedly on 10/31/2021)  who was admitted from 11/23 - 11/25/2021 after being evaluated by his PCP for symptoms of increased abdominal distention, anasarca shortness of breath, scleral icterus, and a 40 pound weight gain c/b scrotal edema in the course of one month. CTAP in the ED at the time revealed large ascites with moderate left pleural effusion and questionable partial thrombosis of portal vein. LFTs were elevated. The patient was admitted and underwent a diagnostic and therapeutic paracentesis; had 7.3 liters removed and was given 2 units albumin post paracentesis. The patient was evaluated by GI, underwent an MR of the abdomen to further evaluate for portal vein thrombosis. The case was reportedly d/w Radiology and GI and given likely chronic appearance of partial portal vein thrombosis the  plan was to discharge without anticoagulation until EGD was done to assess for variceal disease.  Today the patient was referred to the [facility] by his Gastroenterologist for further evaluation and management of progressively worsening ascites and abdominal pain which began yesterday. Of note the patient is now s/p diagnostic and therapeutic paracentesis by Interventional Radiology. Per Gastroenterology additional plans are for MRI/endoscopy to further assess for portal vein thrombosis.  55 y/o M PMHx significant for alcoholic fatty liver, history of drinking beer on a daily basis for last 30 years (last drink was reportedly on 10/31/2021)  who was admitted from 11/23 - 11/25/2021 after being evaluated by his PCP for symptoms of increased abdominal distention, anasarca shortness of breath, scleral icterus, and a 40 pound weight gain c/b scrotal edema in the course of one month. CTAP in the ED at the time revealed large ascites with moderate left pleural effusion and questionable partial thrombosis of portal vein. LFTs were elevated. The patient was admitted and underwent a diagnostic and therapeutic paracentesis; had 7.3 liters removed and was given 2 units albumin post paracentesis. The patient was evaluated by GI, underwent an MR of the abdomen to further evaluate for portal vein thrombosis. The case was reportedly d/w Radiology and GI and given likely chronic appearance of partial portal vein thrombosis the  plan was to discharge without anticoagulation until EGD was done to assess for variceal disease.  Today the patient was referred to the [facility] by his Gastroenterologist for further evaluation and management of progressively worsening ascites and abdominal pain which began yesterday. Of note the patient is now s/p diagnostic and therapeutic paracentesis by Interventional Radiology. Per Gastroenterology additional plans are for MRI/endoscopy to further assess for portal vein thrombosis.     #Large Ascites 2/2 to decompensated cirrhosis  #Moderate Left pleural effusion  #Transaminitis  #Thrombocytopenia  ~admit to Medicine  ~patient is s/p diagnostic and therapeutic paracentesis by interventional radiology   ~f/u results of paracentesis  ~f/u PAN C+S  ~cont. IV abx  ~will cont. Lasix 40mg po dialy  ~will cont. Spironolactone 100mg po daily  ~Per Gastroenterology additional plans are for MRI/endoscopy to further assess for portal vein thrombosis this admission    #Portal vein thrombosis  ~as noted plan is to hold off ac for now until EGD     #Vte ppx  ~IMPROVE Vte Risk Score is 0  ~cont. SCDs for now

## 2021-12-11 LAB
-  STREPTOCOCCUS SP. (NOT GRP A, B OR S PNEUMONIAE): SIGNIFICANT CHANGE UP
ALBUMIN SERPL ELPH-MCNC: 2 G/DL — LOW (ref 3.3–5)
ALP SERPL-CCNC: 105 U/L — SIGNIFICANT CHANGE UP (ref 40–120)
ALT FLD-CCNC: 30 U/L — SIGNIFICANT CHANGE UP (ref 12–78)
ANION GAP SERPL CALC-SCNC: 5 MMOL/L — SIGNIFICANT CHANGE UP (ref 5–17)
AST SERPL-CCNC: 45 U/L — HIGH (ref 15–37)
BASOPHILS # BLD AUTO: 0.07 K/UL — SIGNIFICANT CHANGE UP (ref 0–0.2)
BASOPHILS NFR BLD AUTO: 0.6 % — SIGNIFICANT CHANGE UP (ref 0–2)
BILIRUB SERPL-MCNC: 5.3 MG/DL — HIGH (ref 0.2–1.2)
BUN SERPL-MCNC: 20 MG/DL — SIGNIFICANT CHANGE UP (ref 7–23)
CALCIUM SERPL-MCNC: 7.8 MG/DL — LOW (ref 8.5–10.1)
CHLORIDE SERPL-SCNC: 104 MMOL/L — SIGNIFICANT CHANGE UP (ref 96–108)
CO2 SERPL-SCNC: 25 MMOL/L — SIGNIFICANT CHANGE UP (ref 22–31)
CREAT SERPL-MCNC: 0.76 MG/DL — SIGNIFICANT CHANGE UP (ref 0.5–1.3)
EOSINOPHIL # BLD AUTO: 0.1 K/UL — SIGNIFICANT CHANGE UP (ref 0–0.5)
EOSINOPHIL NFR BLD AUTO: 0.9 % — SIGNIFICANT CHANGE UP (ref 0–6)
GLUCOSE SERPL-MCNC: 87 MG/DL — SIGNIFICANT CHANGE UP (ref 70–99)
GRAM STN FLD: SIGNIFICANT CHANGE UP
HCT VFR BLD CALC: 33 % — LOW (ref 39–50)
HGB BLD-MCNC: 11.5 G/DL — LOW (ref 13–17)
IMM GRANULOCYTES NFR BLD AUTO: 0.3 % — SIGNIFICANT CHANGE UP (ref 0–1.5)
LYMPHOCYTES # BLD AUTO: 1.33 K/UL — SIGNIFICANT CHANGE UP (ref 1–3.3)
LYMPHOCYTES # BLD AUTO: 11.8 % — LOW (ref 13–44)
MCHC RBC-ENTMCNC: 34.1 PG — HIGH (ref 27–34)
MCHC RBC-ENTMCNC: 34.8 GM/DL — SIGNIFICANT CHANGE UP (ref 32–36)
MCV RBC AUTO: 97.9 FL — SIGNIFICANT CHANGE UP (ref 80–100)
METHOD TYPE: SIGNIFICANT CHANGE UP
MONOCYTES # BLD AUTO: 1.03 K/UL — HIGH (ref 0–0.9)
MONOCYTES NFR BLD AUTO: 9.2 % — SIGNIFICANT CHANGE UP (ref 2–14)
NEUTROPHILS # BLD AUTO: 8.67 K/UL — HIGH (ref 1.8–7.4)
NEUTROPHILS NFR BLD AUTO: 77.2 % — HIGH (ref 43–77)
PLATELET # BLD AUTO: 60 K/UL — LOW (ref 150–400)
POTASSIUM SERPL-MCNC: 4.3 MMOL/L — SIGNIFICANT CHANGE UP (ref 3.5–5.3)
POTASSIUM SERPL-SCNC: 4.3 MMOL/L — SIGNIFICANT CHANGE UP (ref 3.5–5.3)
PREALB SERPL-MCNC: 5 MG/DL — LOW (ref 20–40)
PROT SERPL-MCNC: 5.4 GM/DL — LOW (ref 6–8.3)
RBC # BLD: 3.37 M/UL — LOW (ref 4.2–5.8)
RBC # FLD: 15.3 % — HIGH (ref 10.3–14.5)
SODIUM SERPL-SCNC: 134 MMOL/L — LOW (ref 135–145)
SPECIMEN SOURCE: SIGNIFICANT CHANGE UP
WBC # BLD: 11.23 K/UL — HIGH (ref 3.8–10.5)
WBC # FLD AUTO: 11.23 K/UL — HIGH (ref 3.8–10.5)

## 2021-12-11 PROCEDURE — 99233 SBSQ HOSP IP/OBS HIGH 50: CPT | Mod: GC

## 2021-12-11 RX ORDER — AMPICILLIN SODIUM AND SULBACTAM SODIUM 250; 125 MG/ML; MG/ML
3 INJECTION, POWDER, FOR SUSPENSION INTRAMUSCULAR; INTRAVENOUS EVERY 6 HOURS
Refills: 0 | Status: DISCONTINUED | OUTPATIENT
Start: 2021-12-11 | End: 2021-12-14

## 2021-12-11 RX ORDER — CEFTRIAXONE 500 MG/1
2000 INJECTION, POWDER, FOR SOLUTION INTRAMUSCULAR; INTRAVENOUS EVERY 24 HOURS
Refills: 0 | Status: DISCONTINUED | OUTPATIENT
Start: 2021-12-11 | End: 2021-12-11

## 2021-12-11 RX ORDER — VANCOMYCIN HCL 1 G
1000 VIAL (EA) INTRAVENOUS EVERY 12 HOURS
Refills: 0 | Status: DISCONTINUED | OUTPATIENT
Start: 2021-12-11 | End: 2021-12-13

## 2021-12-11 RX ORDER — FUROSEMIDE 40 MG
40 TABLET ORAL
Refills: 0 | Status: DISCONTINUED | OUTPATIENT
Start: 2021-12-11 | End: 2021-12-14

## 2021-12-11 RX ORDER — SPIRONOLACTONE 25 MG/1
100 TABLET, FILM COATED ORAL
Refills: 0 | Status: DISCONTINUED | OUTPATIENT
Start: 2021-12-11 | End: 2021-12-14

## 2021-12-11 RX ADMIN — Medication 40 MILLIGRAM(S): at 11:44

## 2021-12-11 RX ADMIN — SPIRONOLACTONE 100 MILLIGRAM(S): 25 TABLET, FILM COATED ORAL at 22:41

## 2021-12-11 RX ADMIN — Medication 250 MILLIGRAM(S): at 22:41

## 2021-12-11 RX ADMIN — SPIRONOLACTONE 100 MILLIGRAM(S): 25 TABLET, FILM COATED ORAL at 11:44

## 2021-12-11 RX ADMIN — Medication 40 MILLIGRAM(S): at 22:41

## 2021-12-11 RX ADMIN — AMPICILLIN SODIUM AND SULBACTAM SODIUM 200 GRAM(S): 250; 125 INJECTION, POWDER, FOR SUSPENSION INTRAMUSCULAR; INTRAVENOUS at 18:02

## 2021-12-11 NOTE — PATIENT PROFILE ADULT - FUNCTIONAL ASSESSMENT - DAILY ACTIVITY 4.
PM Shift Note      Participation/interaction/behavior in programming_Pt was cooperative and attended all groups and activities this shift. Pt was out on milieu, social with staff and peers. Bright affect. Pt earned reward time tonight and spent it in the dayroom watching a movie. Pt ate all meals and snacks well. Pt denies SI/HI/AVH. Contracted verbally for safety, Q 15 min SP maintained.      Visiting _mother visited in person tonight, visit seemed to go well as pt was observed having a casual conversation with mom in the dayroom     Phone time (8-9pm)_ pt used the phone tonight    Personal hygiene/shower (8-9pm)_ pt showered this evening    Maintained safety precautions Q15 minutes.       Hien Ramos  Mental Health Worker  Pediatric Behavioral Health 5W  [facility]  Ext.      4 = No assist / stand by assistance

## 2021-12-11 NOTE — CONSULT NOTE ADULT - ASSESSMENT
55 y/o male with h/o alcoholic fatty liver, pleural effusion chronic partial portal vein thrombosis was admitted on 12/10 for worsening abdominal distention and pain. He was recently hospitalized (11/23 - 11/25/2021) for increased abdominal distention, large ascites with moderate left pleural effusion and questionable partial thrombosis of portal vein. Paracentesis removed 7.3 liters. The patient was referred to the [facility] by his Gastroenterologist for further evaluation for worsening ascites and abdominal pain which began the day PTA. In ER he received ceftriaxone and vancomycin IV.     1. Sepsis with GPC in pairs and chains. Probable SBP. Alcoholic cirrhosis.  -leukocytosis  -large ascites   -obtain BC x 2, urine c/s  -start vancomycin 1 gm IV q12h and unasyn 3 gm IV q6h  -reason for abx use and side effects reviewed with patient; monitor BMP and vancomycin trough levels   -consider GI evaluation  -consider paracentesis  -old chart reviewed to assess prior cultures  -monitor temps  -f/u CBC  -supportive care  2. Other issues:   -care per medicine   55 y/o male with h/o alcoholic fatty liver, pleural effusion chronic partial portal vein thrombosis was admitted on 12/10 for worsening abdominal distention and pain. He was recently hospitalized (11/23 - 11/25/2021) for increased abdominal distention, large ascites with moderate left pleural effusion and questionable partial thrombosis of portal vein. Paracentesis removed 7.3 liters. The patient was referred to the [facility] by his Gastroenterologist for further evaluation for worsening ascites and abdominal pain which began the day PTA. In ER he received ceftriaxone and vancomycin IV.     1. Sepsis with GPC in pairs and chains. Probable SBP. Alcoholic cirrhosis.  -leukocytosis  -large ascites   -obtain BC x 2, urine c/s  -start vancomycin 1 gm IV q12h and unasyn 3 gm IV q6h  -reason for abx use and side effects reviewed with patient; monitor BMP and vancomycin trough levels   -consider GI evaluation  -s/p paracentesis -f/u fluid culture  -old chart reviewed to assess prior cultures  -monitor temps  -f/u CBC  -supportive care  2. Other issues:   -care per medicine

## 2021-12-11 NOTE — CONSULT NOTE ADULT - ASSESSMENT
Imp:  Decompensated cirrhosis with portal HTN, ascites, and portal vein thrombosis    Rec:  Increase diuretics to lasxi 40 bid and aldactone 100 bid  Plan for EGD monday, possibly followed by starting anticoagulation for portal vein thrombosis Imp:  Decompensated cirrhosis with portal HTN, ascites, and portal vein thrombosis    Rec:  Increase diuretics to lasxi 40 bid and aldactone 100 bid  Plan for EGD monday, possibly followed by starting anticoagulation for portal vein thrombosis  DR CORTES RESUMES CARE TOMORROW

## 2021-12-11 NOTE — CONSULT NOTE ADULT - SUBJECTIVE AND OBJECTIVE BOX
HPI:  57 y/o M PMHx significant for alcoholic fatty liver, history of drinking beer on a daily basis for last 30 years (last drink was reportedly on 10/31/2021)  who was admitted from 11/23 - 11/25/2021 after being evaluated by his PCP for symptoms of increased abdominal distention, anasarca shortness of breath, scleral icterus, and a 40 pound weight gain c/b scrotal edema in the course of one month. CTAP in the ED at the time revealed large ascites with moderate left pleural effusion and questionable partial thrombosis of portal vein. LFTs were elevated. The patient was admitted and underwent a diagnostic and therapeutic paracentesis; had 7.3 liters removed and was given 2 units albumin post paracentesis. The patient was evaluated by GI, underwent an MR of the abdomen to further evaluate for portal vein thrombosis. The case was reportedly d/w Radiology and GI and given likely chronic appearance of partial portal vein thrombosis the  plan was to discharge without anticoagulation until EGD was done to assess for variceal disease.  Today the patient was referred to the [facility] by his Gastroenterologist for further evaluation and management of progressively worsening ascites and abdominal pain which began yesterday. Of note the patient is now s/p diagnostic and therapeutic paracentesis by Interventional Radiology. Per Gastroenterology additional plans are for MRI/endoscopy to further assess for portal vein thrombosis.     Labs => PLT 91, INR 1.65,LA 5.1 -> 3.1, Alb 2.2, TBili 6.0, , AST 59, proBNP 185, TnI 24.36  CT Chest/ABD/Pelvis => Findings suggesting cirrhosis with portal hypertension and large amount   of ascites. Portal vein thrombosis is again noted. Moderate left and mild right pleural effusions which have not significantly changed. Atelectatic lung. No focal consolidation seen. In the ED the patient was given [id number] IVPB x 1, Vancomycin 1750mg IVPB x 1, Ibuprofen 600mg PO x 1, and NS x 1L.   (10 Dec 2021 18:46)  ---------------------  Patient is feeling better after paracentesis    PAST MEDICAL & SURGICAL HISTORY:  ETOH abuse    Alcoholic fatty liver    Thrombocytopenia concurrent with and due to alcoholism    Cirrhosis of liver with ascites    S/P abdominal paracentesis        Home Medications:  Fluzone Quadrivalent [id number] intramuscular suspension: 0.5 milliliter(s) intramuscular once  ***Dose given in October 2021*** (10 Dec 2021 15:57)  Pfizer-BioNTech COVID-19 Vaccine PF 30 mcg/0.3 mL intramuscular suspension: 0.3 milliliter(s) intramuscular once  ***3rd dose given in October 2021*** (10 Dec 2021 15:57)      MEDICATIONS  (STANDING):  furosemide    Tablet 40 milliGRAM(s) Oral daily  spironolactone 100 milliGRAM(s) Oral daily    MEDICATIONS  (PRN):  acetaminophen     Tablet .. 650 milliGRAM(s) Oral every 6 hours PRN Temp greater or equal to 38C (100.4F), Mild Pain (1 - 3)  aluminum hydroxide/magnesium hydroxide/simethicone Suspension 30 milliLiter(s) Oral every 4 hours PRN Dyspepsia  ondansetron Injectable 4 milliGRAM(s) IV Push every 8 hours PRN Nausea and/or Vomiting      Allergies    No Known Allergies    Intolerances        SOCIAL HISTORY:    FAMILY HISTORY:  No pertinent family history in first degree relatives        ROS  As above  Otherwise unremarkable    Vital Signs Last 24 Hrs  T(C): 36.9 (11 Dec 2021 08:06), Max: 36.9 (10 Dec 2021 18:53)  T(F): 98.5 (11 Dec 2021 08:06), Max: 98.5 (10 Dec 2021 18:53)  HR: 79 (11 Dec 2021 08:06) (48 - 90)  BP: 109/60 (11 Dec 2021 08:06) (106/67 - 140/83)  BP(mean): 70 (11 Dec 2021 08:06) (70 - 78)  RR: 17 (11 Dec 2021 08:06) (17 - 19)  SpO2: 93% (11 Dec 2021 08:06) (92% - 96%)    Constitutional: NAD, well-developed  Respiratory: CTAB  Cardiovascular: S1 and S2, RRR  Gastrointestinal: BS+, soft, NT, moderate ascites  Extremities: No peripheral edema  Psychiatric: Normal mood, normal affect  Skin: No rashes    LABS:                        13.3   10.30 )-----------( 81       ( 10 Dec 2021 13:55 )             38.7     12-10    137  |  104  |  18  ----------------------------<  125<H>  4.3   |  25  |  1.00    Ca    8.2<L>      10 Dec 2021 13:55    TPro  6.6  /  Alb  2.2<L>  /  TBili  6.0<H>  /  DBili  x   /  AST  59<H>  /  ALT  40  /  AlkPhos  150<H>  12-10    PT/INR - ( 10 Dec 2021 13:55 )   PT: 18.7 sec;   INR: 1.65 ratio         PTT - ( 10 Dec 2021 13:55 )  PTT:35.8 sec  LIVER FUNCTIONS - ( 10 Dec 2021 13:55 )  Alb: 2.2 g/dL / Pro: 6.6 gm/dL / ALK PHOS: 150 U/L / ALT: 40 U/L / AST: 59 U/L / GGT: x             RADIOLOGY & ADDITIONAL STUDIES: HPI:  COVERING FOR DOCTOR SEBASTIAN  57 y/o M PMHx significant for alcoholic fatty liver, history of drinking beer on a daily basis for last 30 years (last drink was reportedly on 10/31/2021)  who was admitted from 11/23 - 11/25/2021 after being evaluated by his PCP for symptoms of increased abdominal distention, anasarca shortness of breath, scleral icterus, and a 40 pound weight gain c/b scrotal edema in the course of one month. CTAP in the ED at the time revealed large ascites with moderate left pleural effusion and questionable partial thrombosis of portal vein. LFTs were elevated. The patient was admitted and underwent a diagnostic and therapeutic paracentesis; had 7.3 liters removed and was given 2 units albumin post paracentesis. The patient was evaluated by GI, underwent an MR of the abdomen to further evaluate for portal vein thrombosis. The case was reportedly d/w Radiology and GI and given likely chronic appearance of partial portal vein thrombosis the  plan was to discharge without anticoagulation until EGD was done to assess for variceal disease.  Today the patient was referred to the [facility] by his Gastroenterologist for further evaluation and management of progressively worsening ascites and abdominal pain which began yesterday. Of note the patient is now s/p diagnostic and therapeutic paracentesis by Interventional Radiology. Per Gastroenterology additional plans are for MRI/endoscopy to further assess for portal vein thrombosis.     Labs => PLT 91, INR 1.65,LA 5.1 -> 3.1, Alb 2.2, TBili 6.0, , AST 59, proBNP 185, TnI 24.36  CT Chest/ABD/Pelvis => Findings suggesting cirrhosis with portal hypertension and large amount   of ascites. Portal vein thrombosis is again noted. Moderate left and mild right pleural effusions which have not significantly changed. Atelectatic lung. No focal consolidation seen. In the ED the patient was given [id number] IVPB x 1, Vancomycin 1750mg IVPB x 1, Ibuprofen 600mg PO x 1, and NS x 1L.   (10 Dec 2021 18:46)  ---------------------  Patient is feeling better after paracentesis    PAST MEDICAL & SURGICAL HISTORY:  ETOH abuse    Alcoholic fatty liver    Thrombocytopenia concurrent with and due to alcoholism    Cirrhosis of liver with ascites    S/P abdominal paracentesis        Home Medications:  Fluzone Quadrivalent [id number] intramuscular suspension: 0.5 milliliter(s) intramuscular once  ***Dose given in October 2021*** (10 Dec 2021 15:57)  Pfizer-BioNTech COVID-19 Vaccine PF 30 mcg/0.3 mL intramuscular suspension: 0.3 milliliter(s) intramuscular once  ***3rd dose given in October 2021*** (10 Dec 2021 15:57)      MEDICATIONS  (STANDING):  furosemide    Tablet 40 milliGRAM(s) Oral daily  spironolactone 100 milliGRAM(s) Oral daily    MEDICATIONS  (PRN):  acetaminophen     Tablet .. 650 milliGRAM(s) Oral every 6 hours PRN Temp greater or equal to 38C (100.4F), Mild Pain (1 - 3)  aluminum hydroxide/magnesium hydroxide/simethicone Suspension 30 milliLiter(s) Oral every 4 hours PRN Dyspepsia  ondansetron Injectable 4 milliGRAM(s) IV Push every 8 hours PRN Nausea and/or Vomiting      Allergies    No Known Allergies    Intolerances        SOCIAL HISTORY:    FAMILY HISTORY:  No pertinent family history in first degree relatives        ROS  As above  Otherwise unremarkable    Vital Signs Last 24 Hrs  T(C): 36.9 (11 Dec 2021 08:06), Max: 36.9 (10 Dec 2021 18:53)  T(F): 98.5 (11 Dec 2021 08:06), Max: 98.5 (10 Dec 2021 18:53)  HR: 79 (11 Dec 2021 08:06) (48 - 90)  BP: 109/60 (11 Dec 2021 08:06) (106/67 - 140/83)  BP(mean): 70 (11 Dec 2021 08:06) (70 - 78)  RR: 17 (11 Dec 2021 08:06) (17 - 19)  SpO2: 93% (11 Dec 2021 08:06) (92% - 96%)    Constitutional: NAD, well-developed  Respiratory: CTAB  Cardiovascular: S1 and S2, RRR  Gastrointestinal: BS+, soft, NT, moderate ascites  Extremities: No peripheral edema  Psychiatric: Normal mood, normal affect  Skin: No rashes    LABS:                        13.3   10.30 )-----------( 81       ( 10 Dec 2021 13:55 )             38.7     12-10    137  |  104  |  18  ----------------------------<  125<H>  4.3   |  25  |  1.00    Ca    8.2<L>      10 Dec 2021 13:55    TPro  6.6  /  Alb  2.2<L>  /  TBili  6.0<H>  /  DBili  x   /  AST  59<H>  /  ALT  40  /  AlkPhos  150<H>  12-10    PT/INR - ( 10 Dec 2021 13:55 )   PT: 18.7 sec;   INR: 1.65 ratio         PTT - ( 10 Dec 2021 13:55 )  PTT:35.8 sec  LIVER FUNCTIONS - ( 10 Dec 2021 13:55 )  Alb: 2.2 g/dL / Pro: 6.6 gm/dL / ALK PHOS: 150 U/L / ALT: 40 U/L / AST: 59 U/L / GGT: x             RADIOLOGY & ADDITIONAL STUDIES:

## 2021-12-11 NOTE — PROGRESS NOTE ADULT - SUBJECTIVE AND OBJECTIVE BOX
HPI:  57 y/o M PMHx significant for alcoholic fatty liver, history of drinking beer on a daily basis for last 30 years (last drink was reportedly on 10/31/2021)  who was admitted from 11/23 - 11/25/2021 after being evaluated by his PCP for symptoms of increased abdominal distention, anasarca shortness of breath, scleral icterus, and a 40 pound weight gain c/b scrotal edema in the course of one month. CTAP in the ED at the time revealed large ascites with moderate left pleural effusion and questionable partial thrombosis of portal vein. LFTs were elevated. The patient was admitted and underwent a diagnostic and therapeutic paracentesis; had 7.3 liters removed and was given 2 units albumin post paracentesis. The patient was evaluated by GI, underwent an MR of the abdomen to further evaluate for portal vein thrombosis. The case was reportedly d/w Radiology and GI and given likely chronic appearance of partial portal vein thrombosis the  plan was to discharge without anticoagulation until EGD was done to assess for variceal disease.  Today the patient was referred to the [facility] by his Gastroenterologist for further evaluation and management of progressively worsening ascites and abdominal pain which began yesterday. Of note the patient is now s/p diagnostic and therapeutic paracentesis by Interventional Radiology. Per Gastroenterology additional plans are for MRI/endoscopy to further assess for portal vein thrombosis.     Labs => PLT 91, INR 1.65,LA 5.1 -> 3.1, Alb 2.2, TBili 6.0, , AST 59, proBNP 185, TnI 24.36  CT Chest/ABD/Pelvis => Findings suggesting cirrhosis with portal hypertension and large amount   of ascites. Portal vein thrombosis is again noted. Moderate left and mild right pleural effusions which have not significantly changed. Atelectatic lung. No focal consolidation seen. In the ED the patient was given [id number] IVPB x 1, Vancomycin 1750mg IVPB x 1, Ibuprofen 600mg PO x 1, and NS x 1L.  MR Abdomen w/w/o =>  Nondiagnostic exam for portal vein thrombosis secondary to artifact from large ascites and patient unable to maintain adequate breath holds. However, in conjunction with prior CT, findings likely represent nonocclusive chronic portal vein thrombosis. Cirrhosis, large ascites, varices.       SUBJECTIVE:   Patient seen and examine at bedside. Patient mentions his pain improved after the paracentesis.  Discussed with patient positive BCx and the need for Abx and he agrees and understands.  All questions were answered.     REVIEW OF SYSTEMS:  CONSTITUTIONAL: No weakness, fevers or chills  EYES/ENT: No visual changes;  No vertigo or throat pain   NECK: No pain or stiffness  RESPIRATORY: No cough, wheezing, hemoptysis; No shortness of breath  CARDIOVASCULAR: No chest pain or palpitations  GASTROINTESTINAL: No abdominal or epigastric pain. No nausea, vomiting, or hematemesis; No diarrhea or constipation. No melena or hematochezia.  GENITOURINARY: No dysuria, frequency or hematuria  NEUROLOGICAL: No numbness or weakness  SKIN: No itching, burning, rashes, or lesions   All other review of systems is negative unless indicated above    Vital Signs Last 24 Hrs  T(C): 36.7 (11 Dec 2021 12:06), Max: 36.9 (10 Dec 2021 18:53)  T(F): 98.1 (11 Dec 2021 12:06), Max: 98.5 (10 Dec 2021 18:53)  HR: 84 (11 Dec 2021 12:06) (76 - 90)  BP: 123/61 (11 Dec 2021 12:06) (106/67 - 136/69)  BP(mean): 62 (11 Dec 2021 09:52) (62 - 78)  RR: 16 (11 Dec 2021 12:06) (16 - 19)  SpO2: 97% (11 Dec 2021 12:06) (93% - 97%)      PHYSICAL EXAM:  Constitutional: NAD, awake and alert, well-developed  HEENT: +jaundice eyes PERR, EOMI, Normal Hearing, MMM  Neck: Soft and supple, No LAD, No JVD  Respiratory: Breath sounds are clear bilaterally, No wheezing, rales or rhonchi  Cardiovascular: S1 and S2, regular rate and rhythm, no Murmurs, gallops or rubs  Gastrointestinal: Bowel Sounds present, soft, +distended, nontender  Extremities: No peripheral edema  Vascular: 2+ peripheral pulses  Neurological: A/O x 3, no focal deficits  Musculoskeletal: 5/5 strength b/l upper and lower extremities  Skin: +spider angiomas in the chest area    MEDICATIONS:  MEDICATIONS  (STANDING):  ampicillin/sulbactam  IVPB 3 Gram(s) IV Intermittent every 6 hours  furosemide    Tablet 40 milliGRAM(s) Oral two times a day  spironolactone 100 milliGRAM(s) Oral two times a day  vancomycin  IVPB 1000 milliGRAM(s) IV Intermittent every 12 hours      LABS: All Labs Reviewed:                        11.5 11.23 )-----------( 60       ( 11 Dec 2021 09:19 )             33.0     12-11    134<L>  |  104  |  20  ----------------------------<  87  4.3   |  25  |  0.76    Ca    7.8<L>      11 Dec 2021 09:19    TPro  5.4<L>  /  Alb  2.0<L>  /  TBili  5.3<H>  /  DBili  x   /  AST  45<H>  /  ALT  30  /  AlkPhos  105  12-11    PT/INR - ( 10 Dec 2021 13:55 )   PT: 18.7 sec;   INR: 1.65 ratio         PTT - ( 10 Dec 2021 13:55 )  PTT:35.8 sec      Gram Stain:   Growth in aerobic and anaerobic bottles: Gram Positive Cocci in Pairs and   Chains   Specimen Source: .Blood Blood-Peripheral   Culture Results:   Growth in aerobic and anaerobic bottles: Gram Positive Cocci in Pairs and   Chains     RADIOLOGY/EKG:    < from: CT Chest w/ IV Cont (12.10.21 @ 15:08) >    IMPRESSION:  Findings suggesting cirrhosis withportal hypertension and large amount   of ascites. Portal vein thrombosis is again noted.    Moderate left and mild right pleural effusions which have not   significantly changed. Atelectatic lung. No focal consolidation seen.    < end of copied text >   HPI:  55 y/o M PMHx significant for alcoholic fatty liver, history of drinking beer on a daily basis for last 30 years (last drink was reportedly on 10/31/2021)  who was admitted from 11/23 - 11/25/2021 after being evaluated by his PCP for symptoms of increased abdominal distention, anasarca shortness of breath, scleral icterus, and a 40 pound weight gain c/b scrotal edema in the course of one month. CTAP in the ED at the time revealed large ascites with moderate left pleural effusion and questionable partial thrombosis of portal vein. LFTs were elevated. The patient was admitted and underwent a diagnostic and therapeutic paracentesis; had 7.3 liters removed and was given 2 units albumin post paracentesis. The patient was evaluated by GI, underwent an MR of the abdomen to further evaluate for portal vein thrombosis. The case was reportedly d/w Radiology and GI and given likely chronic appearance of partial portal vein thrombosis the  plan was to discharge without anticoagulation until EGD was done to assess for variceal disease.  Today the patient was referred to the [facility] by his Gastroenterologist for further evaluation and management of progressively worsening ascites and abdominal pain which began yesterday. Of note the patient is now s/p diagnostic and therapeutic paracentesis by Interventional Radiology. Per Gastroenterology additional plans are for MRI/endoscopy to further assess for portal vein thrombosis.     Labs => PLT 91, INR 1.65,LA 5.1 -> 3.1, Alb 2.2, TBili 6.0, , AST 59, proBNP 185, TnI 24.36  CT Chest/ABD/Pelvis => Findings suggesting cirrhosis with portal hypertension and large amount   of ascites. Portal vein thrombosis is again noted. Moderate left and mild right pleural effusions which have not significantly changed. Atelectatic lung. No focal consolidation seen. In the ED the patient was given [id number] IVPB x 1, Vancomycin 1750mg IVPB x 1, Ibuprofen 600mg PO x 1, and NS x 1L.  MR Abdomen w/w/o =>  Nondiagnostic exam for portal vein thrombosis secondary to artifact from large ascites and patient unable to maintain adequate breath holds. However, in conjunction with prior CT, findings likely represent nonocclusive chronic portal vein thrombosis. Cirrhosis, large ascites, varices.       SUBJECTIVE:   Patient seen and examine at bedside. Patient mentions his pain improved after the paracentesis.  Discussed with patient positive BCx and the need for Abx and he agrees and understands.  All questions were answered.     REVIEW OF SYSTEMS:  CONSTITUTIONAL: No weakness, fevers or chills  EYES/ENT: No visual changes;  No vertigo or throat pain   NECK: No pain or stiffness  RESPIRATORY: No cough, wheezing, hemoptysis; No shortness of breath  CARDIOVASCULAR: No chest pain or palpitations  GASTROINTESTINAL: No abdominal or epigastric pain. No nausea, vomiting, or hematemesis; No diarrhea or constipation. No melena or hematochezia.  GENITOURINARY: No dysuria, frequency or hematuria  NEUROLOGICAL: No numbness or weakness  SKIN: No itching, burning, rashes, or lesions   All other review of systems is negative unless indicated above    Vital Signs Last 24 Hrs  T(C): 36.7 (11 Dec 2021 12:06), Max: 36.9 (10 Dec 2021 18:53)  T(F): 98.1 (11 Dec 2021 12:06), Max: 98.5 (10 Dec 2021 18:53)  HR: 84 (11 Dec 2021 12:06) (76 - 90)  BP: 123/61 (11 Dec 2021 12:06) (106/67 - 136/69)  BP(mean): 62 (11 Dec 2021 09:52) (62 - 78)  RR: 16 (11 Dec 2021 12:06) (16 - 19)  SpO2: 97% (11 Dec 2021 12:06) (93% - 97%)      PHYSICAL EXAM:  Constitutional: NAD, awake and alert, well-developed  HEENT: +jaundice eyes PERR, EOMI, Normal Hearing, MMM  Neck: Soft and supple, No LAD, No JVD  Respiratory: Breath sounds are clear bilaterally, No wheezing, rales or rhonchi  Cardiovascular: S1 and S2, regular rate and rhythm, no Murmurs, gallops or rubs  Gastrointestinal: Bowel Sounds present, soft, +distended, nontender  Extremities: No peripheral edema  Vascular: 2+ peripheral pulses  Neurological: A/O x 3, no focal deficits  Musculoskeletal: 5/5 strength b/l upper and lower extremities  Skin: +spider angiomas in the chest area    MEDICATIONS:  MEDICATIONS  (STANDING):  ampicillin/sulbactam  IVPB 3 Gram(s) IV Intermittent every 6 hours  furosemide    Tablet 40 milliGRAM(s) Oral two times a day  spironolactone 100 milliGRAM(s) Oral two times a day  vancomycin  IVPB 1000 milliGRAM(s) IV Intermittent every 12 hours      LABS: All Labs Reviewed:                        11.5 11.23 )-----------( 60       ( 11 Dec 2021 09:19 )             33.0     12-11    134<L>  |  104  |  20  ----------------------------<  87  4.3   |  25  |  0.76    Ca    7.8<L>      11 Dec 2021 09:19    TPro  5.4<L>  /  Alb  2.0<L>  /  TBili  5.3<H>  /  DBili  x   /  AST  45<H>  /  ALT  30  /  AlkPhos  105  12-11    PT/INR - ( 10 Dec 2021 13:55 )   PT: 18.7 sec;   INR: 1.65 ratio         PTT - ( 10 Dec 2021 13:55 )  PTT:35.8 sec      Gram Stain:   Growth in aerobic and anaerobic bottles: Gram Positive Cocci in Pairs and   Chains   Specimen Source: .Blood Blood-Peripheral   Culture Results:   Growth in aerobic and anaerobic bottles: Gram Positive Cocci in Pairs and   Chains     RADIOLOGY/EKG:    < from: CT Chest w/ IV Cont (12.10.21 @ 15:08) >    IMPRESSION:  Findings suggesting cirrhosis withportal hypertension and large amount   of ascites. Portal vein thrombosis is again noted.    Moderate left and mild right pleural effusions which have not   significantly changed. Atelectatic lung. No focal consolidation seen.    < end of copied text >

## 2021-12-11 NOTE — PROGRESS NOTE ADULT - ASSESSMENT
55 y/o M PMHx significant for alcoholic fatty liver, history of drinking beer on a daily basis for last 30 years (last drink was reportedly on 10/31/2021)  who was admitted from 11/23 - 11/25/2021 after being evaluated by his PCP for symptoms of increased abdominal distention, anasarca shortness of breath, scleral icterus, and a 40 pound weight gain c/b scrotal edema in the course of one month. CTAP in the ED at the time revealed large ascites with moderate left pleural effusion and questionable partial thrombosis of portal vein. LFTs were elevated. The patient was admitted and underwent a diagnostic and therapeutic paracentesis; had 7.3 liters removed and was given 2 units albumin post paracentesis. The patient was evaluated by GI, underwent an MR of the abdomen to further evaluate for portal vein thrombosis. The case was reportedly d/w Radiology and GI and given likely chronic appearance of partial portal vein thrombosis the  plan was to discharge without anticoagulation until EGD was done to assess for variceal disease.  Today the patient was referred to the [facility] by his Gastroenterologist for further evaluation and management of progressively worsening ascites and abdominal pain which began yesterday. Of note the patient is now s/p diagnostic and therapeutic paracentesis by Interventional Radiology. Per Gastroenterology additional plans are for MRI/endoscopy to further assess for portal vein thrombosis.     #Large Ascites 2/2 to decompensated cirrhosis  #Moderate Left pleural effusion  #Transaminitis  #Thrombocytopenia  ~admit to Medicine  ~patient is s/p diagnostic and therapeutic paracentesis by interventional radiology   ~f/u results of paracentesis  ~f/u PAN C+S  ~cont. IV abx  ~will cont. Lasix 40mg po dialy  ~will cont. Spironolactone 100mg po daily  ~Per Gastroenterology additional plans are for MRI/endoscopy to further assess for portal vein thrombosis this admission    #Portal vein thrombosis  ~as noted plan is to hold off ac for now until EGD     #Vte ppx  ~IMPROVE Vte Risk Score is 0  ~cont. SCDs for now   57 y/o M PMHx significant for alcoholic fatty liver, history of drinking beer on a daily basis for last 30 years (last drink was reportedly on 10/31/2021)  who was admitted from 11/23 - 11/25/2021 after being evaluated by his PCP for symptoms of increased abdominal distention, anasarca shortness of breath, scleral icterus, and a 40 pound weight gain c/b scrotal edema in the course of one month. CTAP in the ED at the time revealed large ascites with moderate left pleural effusion and questionable partial thrombosis of portal vein. LFTs were elevated. The patient was admitted and underwent a diagnostic and therapeutic paracentesis; had 7.3 liters removed and was given 2 units albumin post paracentesis. The patient was evaluated by GI, underwent an MR of the abdomen to further evaluate for portal vein thrombosis. The case was reportedly d/w Radiology and GI and given likely chronic appearance of partial portal vein thrombosis the  plan was to discharge without anticoagulation until EGD was done to assess for variceal disease.  Today the patient was referred to the [facility] by his Gastroenterologist for further evaluation and management of progressively worsening ascites and abdominal pain which began yesterday. Of note the patient is now s/p diagnostic and therapeutic paracentesis by Interventional Radiology. Per Gastroenterology additional plans are for MRI/endoscopy to further assess for portal vein thrombosis.     #Sepsis.  probable SBP  - BCx grew Gram positive cocci  - Lactate 5.1->3.1  - WBC 11.23  - No IVF given due to Ascites and hemodynamically stable  - s/p Rocephin 1000mg in ED  - ID recommendations appreciated  - Started on Unasyn and Vanco    #Large Ascites 2/2 to decompensated cirrhosis  - patient is s/p diagnostic and therapeutic paracentesis by interventional radiology   - f/u results of paracentesis and culture  - cont. Lasix 40mg po dialy  - cont. Spironolactone 100mg po daily  - Per Gastroenterology: plan for possible EGD on monday    #Vte ppx  - IMPROVE Vte Risk Score is 0  - cont. SCDs for now    *Case discussed with Dr. Vaz

## 2021-12-11 NOTE — PROVIDER CONTACT NOTE (CRITICAL VALUE NOTIFICATION) - TEST AND RESULT REPORTED:
lactate 5.1
blood culture gram + cocci in nadeen and chains
blood cultures: + anaerobic cocci in pairs

## 2021-12-11 NOTE — PATIENT PROFILE ADULT - FALL HARM RISK - HARM RISK INTERVENTIONS

## 2021-12-11 NOTE — CONSULT NOTE ADULT - SUBJECTIVE AND OBJECTIVE BOX
Patient is a 56y old  Male who presents with a chief complaint of Abdominal pain/distention    HPI:  57 y/o male with h/o alcoholic fatty liver, pleural effusion chronic partial portal vein thrombosis was admitted on 12/10 for worsening abdominal distention and pain. He was recently hospitalized (11/23 - 11/25/2021) for increased abdominal distention, large ascites with moderate left pleural effusion and questionable partial thrombosis of portal vein. Paracentesis removed 7.3 liters. The patient was referred to the [facility] by his Gastroenterologist for further evaluation for worsening ascites and abdominal pain which began the day PTA. In ER he received ceftriaxone and vancomycin IV.     PMH: as above  PSH: as above  Meds: per reconciliation sheet, noted below  MEDICATIONS  (STANDING):  furosemide    Tablet 40 milliGRAM(s) Oral two times a day  spironolactone 100 milliGRAM(s) Oral two times a day    MEDICATIONS  (PRN):  acetaminophen     Tablet .. 650 milliGRAM(s) Oral every 6 hours PRN Temp greater or equal to 38C (100.4F), Mild Pain (1 - 3)  aluminum hydroxide/magnesium hydroxide/simethicone Suspension 30 milliLiter(s) Oral every 4 hours PRN Dyspepsia  ondansetron Injectable 4 milliGRAM(s) IV Push every 8 hours PRN Nausea and/or Vomiting    Allergies    No Known Allergies    Intolerances      Social: no smoking, history of drinking beer on a daily basis for last 30 years (last drink was reportedly on 10/31/2021), no illegal drugs; no recent travel, no exposure to TB  FAMILY HISTORY:  No pertinent family history in first degree relatives      no history of premature cardiovascular disease in first degree relatives    ROS: the patient denies fever, no chills, no HA, no seizures, no dizziness, no sore throat, no nasal congestion, no blurry vision, no CP, no palpitations, no SOB, no cough, has abdominal pain, has abdominal distension, no diarrhea, no N/V, no dysuria, no leg pain, no claudication, no rash, no joint aches, no rectal pain or bleeding, no night sweats  All other systems reviewed and are negative    Vital Signs Last 24 Hrs  T(C): 36.7 (11 Dec 2021 12:06), Max: 36.9 (10 Dec 2021 18:53)  T(F): 98.1 (11 Dec 2021 12:06), Max: 98.5 (10 Dec 2021 18:53)  HR: 84 (11 Dec 2021 12:06) (76 - 90)  BP: 123/61 (11 Dec 2021 12:06) (106/67 - 136/69)  BP(mean): 62 (11 Dec 2021 09:52) (62 - 78)  RR: 16 (11 Dec 2021 12:06) (16 - 19)  SpO2: 97% (11 Dec 2021 12:06) (93% - 97%)  Daily     Daily     PE:    Constitutional:  No acute distress  HEENT: NC/AT, EOMI, PERRLA, conjunctivae clear; ears and nose atraumatic; pharynx benign  Neck: supple; thyroid not palpable  Back: no tenderness  Respiratory: respiratory effort normal; clear to auscultation  Cardiovascular: S1S2 regular, no murmurs  Abdomen: soft, has epigastric tender, distended, positive BS; no liver or spleen organomegaly  Genitourinary: no suprapubic tenderness  Lymphatic: no LN palpable  Musculoskeletal: no muscle tenderness, no joint swelling or tenderness  Extremities: no pedal edema  Neurological/ Psychiatric: AxOx3, judgement and insight normal; moving all extremities  Skin: no rashes; no palpable lesions    Labs: all available labs reviewed                        11.5 11.23 )-----------( 60       ( 11 Dec 2021 09:19 )             33.0     12-11    134<L>  |  104  |  20  ----------------------------<  87  4.3   |  25  |  0.76    Ca    7.8<L>      11 Dec 2021 09:19    TPro  5.4<L>  /  Alb  2.0<L>  /  TBili  5.3<H>  /  DBili  x   /  AST  45<H>  /  ALT  30  /  AlkPhos  105  12-11     LIVER FUNCTIONS - ( 11 Dec 2021 09:19 )  Alb: 2.0 g/dL / Pro: 5.4 gm/dL / ALK PHOS: 105 U/L / ALT: 30 U/L / AST: 45 U/L / GGT: x             Culture - Body Fluid with Gram Stain (collected 10 Dec 2021 15:36)  Source: .Body Fluid Peritoneal Fluid  Gram Stain (11 Dec 2021 06:23):    polymorphonuclear leukocytes seen    No organisms seen    by cytocentrifuge    Culture - Blood (collected 10 Dec 2021 13:55)  Source: .Blood Blood-Peripheral  Gram Stain (11 Dec 2021 09:55):    Growth in aerobic and anaerobic bottles: Gram Positive Cocci in Pairs and    Chains  Preliminary Report (11 Dec 2021 09:55):    Growth in aerobic and anaerobic bottles: Gram Positive Cocci in Pairs and    Chains    Culture - Blood (collected 10 Dec 2021 13:55)  Source: .Blood Blood-Peripheral  Gram Stain (11 Dec 2021 09:56):    Growth in anaerobic bottle: Gram positive cocci in pairs    Growth in aerobic bottle: Gram Positive Cocci in Pairs and Chains  Preliminary Report (11 Dec 2021 09:56):    Growth in anaerobic bottle: Gram positive cocci in pairs    Growth in aerobic bottle: Gram Positive Cocci in Pairs and Chains  Organism: Blood Culture PCR (11 Dec 2021 10:04)      -  Streptococcus sp. (Not Grp A, B or S pneumoniae): Detec      Method Type: PCR        COVID-19 PCR: NotDetec (12-10-21 @ 13:55)          Radiology: all available radiological tests reviewed    Advanced directives addressed: full resuscitation

## 2021-12-12 LAB
ALBUMIN SERPL ELPH-MCNC: 2 G/DL — LOW (ref 3.3–5)
ALP SERPL-CCNC: 113 U/L — SIGNIFICANT CHANGE UP (ref 40–120)
ALT FLD-CCNC: 31 U/L — SIGNIFICANT CHANGE UP (ref 12–78)
ANION GAP SERPL CALC-SCNC: 5 MMOL/L — SIGNIFICANT CHANGE UP (ref 5–17)
APPEARANCE UR: CLEAR — SIGNIFICANT CHANGE UP
APTT BLD: 40.6 SEC — HIGH (ref 27.5–35.5)
AST SERPL-CCNC: 47 U/L — HIGH (ref 15–37)
BASOPHILS # BLD AUTO: 0.06 K/UL — SIGNIFICANT CHANGE UP (ref 0–0.2)
BASOPHILS NFR BLD AUTO: 0.8 % — SIGNIFICANT CHANGE UP (ref 0–2)
BILIRUB SERPL-MCNC: 3.5 MG/DL — HIGH (ref 0.2–1.2)
BILIRUB UR-MCNC: NEGATIVE — SIGNIFICANT CHANGE UP
BUN SERPL-MCNC: 16 MG/DL — SIGNIFICANT CHANGE UP (ref 7–23)
CALCIUM SERPL-MCNC: 7.7 MG/DL — LOW (ref 8.5–10.1)
CHLORIDE SERPL-SCNC: 104 MMOL/L — SIGNIFICANT CHANGE UP (ref 96–108)
CO2 SERPL-SCNC: 25 MMOL/L — SIGNIFICANT CHANGE UP (ref 22–31)
COLOR SPEC: YELLOW — SIGNIFICANT CHANGE UP
CREAT SERPL-MCNC: 0.76 MG/DL — SIGNIFICANT CHANGE UP (ref 0.5–1.3)
CULTURE RESULTS: SIGNIFICANT CHANGE UP
DIFF PNL FLD: NEGATIVE — SIGNIFICANT CHANGE UP
EOSINOPHIL # BLD AUTO: 0.18 K/UL — SIGNIFICANT CHANGE UP (ref 0–0.5)
EOSINOPHIL NFR BLD AUTO: 2.5 % — SIGNIFICANT CHANGE UP (ref 0–6)
GLUCOSE SERPL-MCNC: 91 MG/DL — SIGNIFICANT CHANGE UP (ref 70–99)
GLUCOSE UR QL: NEGATIVE MG/DL — SIGNIFICANT CHANGE UP
HCT VFR BLD CALC: 35.8 % — LOW (ref 39–50)
HGB BLD-MCNC: 12.4 G/DL — LOW (ref 13–17)
IMM GRANULOCYTES NFR BLD AUTO: 0.3 % — SIGNIFICANT CHANGE UP (ref 0–1.5)
INR BLD: 1.81 RATIO — HIGH (ref 0.88–1.16)
KETONES UR-MCNC: NEGATIVE — SIGNIFICANT CHANGE UP
LEUKOCYTE ESTERASE UR-ACNC: NEGATIVE — SIGNIFICANT CHANGE UP
LYMPHOCYTES # BLD AUTO: 1.37 K/UL — SIGNIFICANT CHANGE UP (ref 1–3.3)
LYMPHOCYTES # BLD AUTO: 19.1 % — SIGNIFICANT CHANGE UP (ref 13–44)
MAGNESIUM SERPL-MCNC: 1.9 MG/DL — SIGNIFICANT CHANGE UP (ref 1.6–2.6)
MCHC RBC-ENTMCNC: 34.4 PG — HIGH (ref 27–34)
MCHC RBC-ENTMCNC: 34.6 GM/DL — SIGNIFICANT CHANGE UP (ref 32–36)
MCV RBC AUTO: 99.4 FL — SIGNIFICANT CHANGE UP (ref 80–100)
MELD SCORE WITH DIALYSIS: 32 POINTS — SIGNIFICANT CHANGE UP
MELD SCORE WITHOUT DIALYSIS: 20 POINTS — SIGNIFICANT CHANGE UP
MONOCYTES # BLD AUTO: 0.93 K/UL — HIGH (ref 0–0.9)
MONOCYTES NFR BLD AUTO: 12.9 % — SIGNIFICANT CHANGE UP (ref 2–14)
NEUTROPHILS # BLD AUTO: 4.63 K/UL — SIGNIFICANT CHANGE UP (ref 1.8–7.4)
NEUTROPHILS NFR BLD AUTO: 64.4 % — SIGNIFICANT CHANGE UP (ref 43–77)
NITRITE UR-MCNC: NEGATIVE — SIGNIFICANT CHANGE UP
ORGANISM # SPEC MICROSCOPIC CNT: SIGNIFICANT CHANGE UP
ORGANISM # SPEC MICROSCOPIC CNT: SIGNIFICANT CHANGE UP
PH UR: 5 — SIGNIFICANT CHANGE UP (ref 5–8)
PHOSPHATE SERPL-MCNC: 3 MG/DL — SIGNIFICANT CHANGE UP (ref 2.5–4.5)
PLATELET # BLD AUTO: 78 K/UL — LOW (ref 150–400)
POTASSIUM SERPL-MCNC: 3.9 MMOL/L — SIGNIFICANT CHANGE UP (ref 3.5–5.3)
POTASSIUM SERPL-SCNC: 3.9 MMOL/L — SIGNIFICANT CHANGE UP (ref 3.5–5.3)
PROT SERPL-MCNC: 5.8 GM/DL — LOW (ref 6–8.3)
PROT UR-MCNC: NEGATIVE MG/DL — SIGNIFICANT CHANGE UP
PROTHROM AB SERPL-ACNC: 20.4 SEC — HIGH (ref 10.6–13.6)
RBC # BLD: 3.6 M/UL — LOW (ref 4.2–5.8)
RBC # FLD: 15.1 % — HIGH (ref 10.3–14.5)
SODIUM SERPL-SCNC: 134 MMOL/L — LOW (ref 135–145)
SP GR SPEC: 1.01 — SIGNIFICANT CHANGE UP (ref 1.01–1.02)
SPECIMEN SOURCE: SIGNIFICANT CHANGE UP
UROBILINOGEN FLD QL: NEGATIVE MG/DL — SIGNIFICANT CHANGE UP
VANCOMYCIN TROUGH SERPL-MCNC: 10.3 UG/ML — SIGNIFICANT CHANGE UP (ref 10–20)
WBC # BLD: 7.19 K/UL — SIGNIFICANT CHANGE UP (ref 3.8–10.5)
WBC # FLD AUTO: 7.19 K/UL — SIGNIFICANT CHANGE UP (ref 3.8–10.5)

## 2021-12-12 PROCEDURE — 99232 SBSQ HOSP IP/OBS MODERATE 35: CPT | Mod: GC

## 2021-12-12 RX ADMIN — Medication 40 MILLIGRAM(S): at 11:51

## 2021-12-12 RX ADMIN — SPIRONOLACTONE 100 MILLIGRAM(S): 25 TABLET, FILM COATED ORAL at 22:15

## 2021-12-12 RX ADMIN — Medication 250 MILLIGRAM(S): at 11:50

## 2021-12-12 RX ADMIN — SPIRONOLACTONE 100 MILLIGRAM(S): 25 TABLET, FILM COATED ORAL at 11:55

## 2021-12-12 RX ADMIN — AMPICILLIN SODIUM AND SULBACTAM SODIUM 200 GRAM(S): 250; 125 INJECTION, POWDER, FOR SUSPENSION INTRAMUSCULAR; INTRAVENOUS at 13:19

## 2021-12-12 RX ADMIN — Medication 250 MILLIGRAM(S): at 22:14

## 2021-12-12 RX ADMIN — AMPICILLIN SODIUM AND SULBACTAM SODIUM 200 GRAM(S): 250; 125 INJECTION, POWDER, FOR SUSPENSION INTRAMUSCULAR; INTRAVENOUS at 18:54

## 2021-12-12 RX ADMIN — Medication 40 MILLIGRAM(S): at 22:15

## 2021-12-12 RX ADMIN — AMPICILLIN SODIUM AND SULBACTAM SODIUM 200 GRAM(S): 250; 125 INJECTION, POWDER, FOR SUSPENSION INTRAMUSCULAR; INTRAVENOUS at 00:24

## 2021-12-12 RX ADMIN — AMPICILLIN SODIUM AND SULBACTAM SODIUM 200 GRAM(S): 250; 125 INJECTION, POWDER, FOR SUSPENSION INTRAMUSCULAR; INTRAVENOUS at 06:07

## 2021-12-12 NOTE — DIETITIAN INITIAL EVALUATION ADULT. - PERTINENT LABORATORY DATA
12-12    134<L>  |  104  |  16  ----------------------------<  91  3.9   |  25  |  0.76    Ca    7.7<L>      12 Dec 2021 08:40  Phos  3.0     12-12  Mg     1.9     12-12    TPro  5.8<L>  /  Alb  2.0<L>  /  TBili  3.5<H>  /  DBili  x   /  AST  47<H>  /  ALT  31  /  AlkPhos  113  12-12  BMI: BMI (kg/m2): 34.3 (12-10-21 @ 13:13)  HbA1c:   Glucose:   BP: 113/72 (12-12-21 @ 11:45) (91/53 - 140/83)  Lipid Panel: Date/Time: 11-24-21 @ 05:35  Cholesterol, Serum: 174  Direct LDL: --  HDL Cholesterol, Serum: 51  Total Cholesterol/HDL Ration Measurement: --  Triglycerides, Serum: 65  Iron Total, Serum: 117 ug/dL (11-23-21 @ 18:47)

## 2021-12-12 NOTE — DIETITIAN INITIAL EVALUATION ADULT. - ADD RECOMMEND
1) add ensure pudding BID and gelatein BID 2) add MVI with minerals daily, thiamine 100mg daily, and consider adding folic supplementation 3) daily wt checks to track/trend changes 4) monitor BM; if > 3 days without BM, adjust bowel regimen prn

## 2021-12-12 NOTE — PROGRESS NOTE ADULT - SUBJECTIVE AND OBJECTIVE BOX
HPI:  55 y/o M PMHx significant for alcoholic fatty liver, history of drinking beer on a daily basis for last 30 years (last drink was reportedly on 10/31/2021)  who was admitted from 11/23 - 11/25/2021 after being evaluated by his PCP for symptoms of increased abdominal distention, anasarca shortness of breath, scleral icterus, and a 40 pound weight gain c/b scrotal edema in the course of one month. CTAP in the ED at the time revealed large ascites with moderate left pleural effusion and questionable partial thrombosis of portal vein. LFTs were elevated. The patient was admitted and underwent a diagnostic and therapeutic paracentesis; had 7.3 liters removed and was given 2 units albumin post paracentesis. The patient was evaluated by GI, underwent an MR of the abdomen to further evaluate for portal vein thrombosis. The case was reportedly d/w Radiology and GI and given likely chronic appearance of partial portal vein thrombosis the  plan was to discharge without anticoagulation until EGD was done to assess for variceal disease.  Today the patient was referred to the [facility] by his Gastroenterologist for further evaluation and management of progressively worsening ascites and abdominal pain which began yesterday. Of note the patient is now s/p diagnostic and therapeutic paracentesis by Interventional Radiology. Per Gastroenterology additional plans are for MRI/endoscopy to further assess for portal vein thrombosis.   Labs => PLT 91, INR 1.65,LA 5.1 -> 3.1, Alb 2.2, TBili 6.0, , AST 59, proBNP 185, TnI 24.36  CT Chest/ABD/Pelvis => Findings suggesting cirrhosis with portal hypertension and large amount   of ascites. Portal vein thrombosis is again noted. Moderate left and mild right pleural effusions which have not significantly changed. Atelectatic lung. No focal consolidation seen. In the ED the patient was given [id number] IVPB x 1, Vancomycin 1750mg IVPB x 1, Ibuprofen 600mg PO x 1, and NS x 1L.  MR Abdomen w/w/o =>  Nondiagnostic exam for portal vein thrombosis secondary to artifact from large ascites and patient unable to maintain adequate breath holds. However, in conjunction with prior CT, findings likely represent nonocclusive chronic portal vein thrombosis. Cirrhosis, large ascites, varices.       SUBJECTIVE:   Patient seen and examine at bedside. Pt currently complains of no abdominal pain, and also endorsing decreasing LE edema/increased mobility. No f/c, cp, n/v, changes in bm. No additional symptoms or complaints reported.    REVIEW OF SYSTEMS:  ROS as stated above    Vital Signs Last 24 Hrs  T(C): 36.7 (11 Dec 2021 12:06), Max: 36.9 (10 Dec 2021 18:53)  T(F): 98.1 (11 Dec 2021 12:06), Max: 98.5 (10 Dec 2021 18:53)  HR: 84 (11 Dec 2021 12:06) (76 - 90)  BP: 123/61 (11 Dec 2021 12:06) (106/67 - 136/69)  BP(mean): 62 (11 Dec 2021 09:52) (62 - 78)  RR: 16 (11 Dec 2021 12:06) (16 - 19)  SpO2: 97% (11 Dec 2021 12:06) (93% - 97%)      PHYSICAL EXAM:  Constitutional: NAD, awake and alert, well-developed  HEENT: +jaundice eyes PERR, EOMI, Normal Hearing, MMM  Neck: Soft and supple, No LAD, No JVD  Respiratory: Breath sounds are clear bilaterally, No wheezing, rales or rhonchi  Cardiovascular: S1 and S2, regular rate and rhythm, no Murmurs, gallops or rubs  Gastrointestinal: Bowel Sounds present, soft, +distended, nontender  Extremities: No peripheral edema  Vascular: 2+ peripheral pulses  Neurological: A/O x 3, no focal deficits  Musculoskeletal: 5/5 strength b/l upper and lower extremities  Skin: +spider angiomas in the chest area    MEDICATIONS:  MEDICATIONS:  MEDICATIONS  (STANDING):  ampicillin/sulbactam  IVPB 3 Gram(s) IV Intermittent every 6 hours  furosemide    Tablet 40 milliGRAM(s) Oral two times a day  spironolactone 100 milliGRAM(s) Oral two times a day  vancomycin  IVPB 1000 milliGRAM(s) IV Intermittent every 12 hours    MEDICATIONS  (PRN):  acetaminophen     Tablet .. 650 milliGRAM(s) Oral every 6 hours PRN Temp greater or equal to 38C (100.4F), Mild Pain (1 - 3)  aluminum hydroxide/magnesium hydroxide/simethicone Suspension 30 milliLiter(s) Oral every 4 hours PRN Dyspepsia  ondansetron Injectable 4 milliGRAM(s) IV Push every 8 hours PRN Nausea and/or Vomiting      LABS: All Labs Reviewed:                        12.4   7.19  )-----------( 78       ( 12 Dec 2021 08:40 )             35.8     12-12    134<L>  |  104  |  16  ----------------------------<  91  3.9   |  25  |  0.76    Ca    7.7<L>      12 Dec 2021 08:40  Phos  3.0     12-12  Mg     1.9     12-12    TPro  5.8<L>  /  Alb  2.0<L>  /  TBili  3.5<H>  /  DBili  x   /  AST  47<H>  /  ALT  31  /  AlkPhos  113  12-12    PT/INR - ( 12 Dec 2021 08:40 )   PT: 20.4 sec;   INR: 1.81 ratio         PTT - ( 12 Dec 2021 08:40 )  PTT:40.6 sec      Blood Culture: 12-10 @ 15:36  Organism --  Gram Stain Blood -- Gram Stain   polymorphonuclear leukocytes seen  No organisms seen  by cytocentrifuge  Specimen Source .Body Fluid Peritoneal Fluid  Culture-Blood --    12-10 @ 13:55  Organism Blood Culture PCR  Gram Stain Blood -- Gram Stain   Growth in anaerobic bottle: Gram positive cocci in pairs  Growth in aerobic bottle: Gram Positive Cocci in Pairs and Chains  Specimen Source .Blood Blood-Peripheral  Culture-Blood --      I&O's Summary    11 Dec 2021 07:01  -  12 Dec 2021 07:00  --------------------------------------------------------  IN: 0 mL / OUT: 1050 mL / NET: -1050 mL    12 Dec 2021 07:01  -  12 Dec 2021 17:03  --------------------------------------------------------  IN: 840 mL / OUT: 400 mL / NET: 440 mL      RADIOLOGY/EKG:    < from: CT Chest/Abdomen/Pelvis w/ IV Cont (12.10.21 @ 15:08) >    IMPRESSION:  Findings suggesting cirrhosis withportal hypertension and large amount   of ascites. Portal vein thrombosis is again noted.    Moderate left and mild right pleural effusions which have not   significantly changed. Atelectatic lung. No focal consolidation seen.    < end of copied text >   HPI:  55 y/o M PMHx significant for alcoholic fatty liver, history of drinking beer on a daily basis for last 30 years (last drink was reportedly on 10/31/2021)  who was admitted from 11/23 - 11/25/2021 after being evaluated by his PCP for symptoms of increased abdominal distention, anasarca shortness of breath, scleral icterus, and a 40 pound weight gain c/b scrotal edema in the course of one month. CTAP in the ED at the time revealed large ascites with moderate left pleural effusion and questionable partial thrombosis of portal vein. LFTs were elevated. The patient was admitted and underwent a diagnostic and therapeutic paracentesis; had 7.3 liters removed and was given 2 units albumin post paracentesis. The patient was evaluated by GI, underwent an MR of the abdomen to further evaluate for portal vein thrombosis. The case was reportedly d/w Radiology and GI and given likely chronic appearance of partial portal vein thrombosis the  plan was to discharge without anticoagulation until EGD was done to assess for variceal disease.  Today the patient was referred to the [facility] by his Gastroenterologist for further evaluation and management of progressively worsening ascites and abdominal pain which began yesterday. Of note the patient is now s/p diagnostic and therapeutic paracentesis by Interventional Radiology. Per Gastroenterology additional plans are for MRI/endoscopy to further assess for portal vein thrombosis.   Labs => PLT 91, INR 1.65,LA 5.1 -> 3.1, Alb 2.2, TBili 6.0, , AST 59, proBNP 185, TnI 24.36  CT Chest/ABD/Pelvis => Findings suggesting cirrhosis with portal hypertension and large amount   of ascites. Portal vein thrombosis is again noted. Moderate left and mild right pleural effusions which have not significantly changed. Atelectatic lung. No focal consolidation seen. In the ED the patient was given [id number] IVPB x 1, Vancomycin 1750mg IVPB x 1, Ibuprofen 600mg PO x 1, and NS x 1L.  MR Abdomen w/w/o =>  Nondiagnostic exam for portal vein thrombosis secondary to artifact from large ascites and patient unable to maintain adequate breath holds. However, in conjunction with prior CT, findings likely represent nonocclusive chronic portal vein thrombosis. Cirrhosis, large ascites, varices.       SUBJECTIVE:   Patient seen and examine at bedside. Pt currently complains of no abdominal pain, and also endorsing decreasing LE edema/increased mobility. No f/c, cp, n/v, changes in bm. No additional symptoms or complaints reported.    REVIEW OF SYSTEMS:  ROS as stated above    Vital Signs Last 24 Hrs  T(C): 36.7 (11 Dec 2021 12:06), Max: 36.9 (10 Dec 2021 18:53)  T(F): 98.1 (11 Dec 2021 12:06), Max: 98.5 (10 Dec 2021 18:53)  HR: 84 (11 Dec 2021 12:06) (76 - 90)  BP: 123/61 (11 Dec 2021 12:06) (106/67 - 136/69)  BP(mean): 62 (11 Dec 2021 09:52) (62 - 78)  RR: 16 (11 Dec 2021 12:06) (16 - 19)  SpO2: 97% (11 Dec 2021 12:06) (93% - 97%)      PHYSICAL EXAM:  Constitutional: NAD, awake and alert, well-developed  HEENT: +jaundice eyes PERR, EOMI, Normal Hearing, MMM  Neck: Soft and supple, No LAD, No JVD  Respiratory: Breath sounds are clear bilaterally, No wheezing, rales or rhonchi  Cardiovascular: S1 and S2, regular rate and rhythm, no Murmurs, gallops or rubs  Gastrointestinal: Bowel Sounds present, soft, +distended, nontender  Extremities: Trace edema b/l  Vascular: 2+ peripheral pulses  Neurological: A/O x 3, no focal deficits  Skin: +spider angiomas in the chest area    MEDICATIONS:  MEDICATIONS:  MEDICATIONS  (STANDING):  ampicillin/sulbactam  IVPB 3 Gram(s) IV Intermittent every 6 hours  furosemide    Tablet 40 milliGRAM(s) Oral two times a day  spironolactone 100 milliGRAM(s) Oral two times a day  vancomycin  IVPB 1000 milliGRAM(s) IV Intermittent every 12 hours    MEDICATIONS  (PRN):  acetaminophen     Tablet .. 650 milliGRAM(s) Oral every 6 hours PRN Temp greater or equal to 38C (100.4F), Mild Pain (1 - 3)  aluminum hydroxide/magnesium hydroxide/simethicone Suspension 30 milliLiter(s) Oral every 4 hours PRN Dyspepsia  ondansetron Injectable 4 milliGRAM(s) IV Push every 8 hours PRN Nausea and/or Vomiting      LABS: All Labs Reviewed:                        12.4   7.19  )-----------( 78       ( 12 Dec 2021 08:40 )             35.8     12-12    134<L>  |  104  |  16  ----------------------------<  91  3.9   |  25  |  0.76    Ca    7.7<L>      12 Dec 2021 08:40  Phos  3.0     12-12  Mg     1.9     12-12    TPro  5.8<L>  /  Alb  2.0<L>  /  TBili  3.5<H>  /  DBili  x   /  AST  47<H>  /  ALT  31  /  AlkPhos  113  12-12    PT/INR - ( 12 Dec 2021 08:40 )   PT: 20.4 sec;   INR: 1.81 ratio         PTT - ( 12 Dec 2021 08:40 )  PTT:40.6 sec      Blood Culture: 12-10 @ 15:36  Organism --  Gram Stain Blood -- Gram Stain   polymorphonuclear leukocytes seen  No organisms seen  by cytocentrifuge  Specimen Source .Body Fluid Peritoneal Fluid  Culture-Blood --    12-10 @ 13:55  Organism Blood Culture PCR  Gram Stain Blood -- Gram Stain   Growth in anaerobic bottle: Gram positive cocci in pairs  Growth in aerobic bottle: Gram Positive Cocci in Pairs and Chains  Specimen Source .Blood Blood-Peripheral  Culture-Blood --      I&O's Summary    11 Dec 2021 07:01  -  12 Dec 2021 07:00  --------------------------------------------------------  IN: 0 mL / OUT: 1050 mL / NET: -1050 mL    12 Dec 2021 07:01  -  12 Dec 2021 17:03  --------------------------------------------------------  IN: 840 mL / OUT: 400 mL / NET: 440 mL      RADIOLOGY/EKG:    < from: CT Chest/Abdomen/Pelvis w/ IV Cont (12.10.21 @ 15:08) >    IMPRESSION:  Findings suggesting cirrhosis withportal hypertension and large amount   of ascites. Portal vein thrombosis is again noted.    Moderate left and mild right pleural effusions which have not   significantly changed. Atelectatic lung. No focal consolidation seen.    < end of copied text >

## 2021-12-12 NOTE — DIETITIAN INITIAL EVALUATION ADULT. - MALNUTRITION
severe malnutrition in acute on chronic illness r/t AEB severe malnutrition in acute on chronic illness r/t decreased ability to meet increased needs 2/2 liver disease with fluid accumulation AEB mod muscle/severe fat wasting

## 2021-12-12 NOTE — DIETITIAN INITIAL EVALUATION ADULT. - OTHER INFO
57yo male with PMH significant for alcoholic fatty liver, ETOH abuse, scleral icterus, cirrhosis admitted for sepsis probable SBP, large ascites 2/2 decompensated cirrhosis.  s/p paracentesis on 12/11.

## 2021-12-12 NOTE — PROGRESS NOTE ADULT - SUBJECTIVE AND OBJECTIVE BOX
GI     HPI:  feels improved  minimal abd pain  no n/v  no melena  abiloa PO      ROS  As above  Otherwise unremarkable, all systems reviewed    PE:  Vital Signs Last 24 Hrs  T(C): 36.9 (12 Dec 2021 11:45), Max: 37.3 (11 Dec 2021 20:03)  T(F): 98.4 (12 Dec 2021 11:45), Max: 99.1 (11 Dec 2021 20:03)  HR: 84 (12 Dec 2021 11:45) (84 - 90)  BP: 113/72 (12 Dec 2021 11:45) (91/53 - 113/72)  BP(mean): 62 (12 Dec 2021 09:13) (62 - 62)  RR: 17 (12 Dec 2021 11:45) (16 - 17)  SpO2: 96% (12 Dec 2021 11:45) (94% - 96%)    Constitutional: NAD, A+Ox3, temporal wasting  Anicteric   Respiratory: CTABL, breathing comfortably  Cardiovascular: S1 and S2, RRR  Gastrointestinal: +BS, soft, non tender, + distended with ascites, no mass  Extremities: warm, well perfused, 2+ lower ext edema  Psychiatric: Normal mood, normal affect  Neuro: moves all extremities, grossly intact  no asterixis  Skin: No rashes or lesions    LABS:                        12.4   7.19  )-----------( 78       ( 12 Dec 2021 08:40 )             35.8     12-12    134<L>  |  104  |  16  ----------------------------<  91  3.9   |  25  |  0.76    Ca    7.7<L>      12 Dec 2021 08:40  Phos  3.0     12-12  Mg     1.9     12-12    TPro  5.8<L>  /  Alb  2.0<L>  /  TBili  3.5<H>  /  DBili  x   /  AST  47<H>  /  ALT  31  /  AlkPhos  113  12-12    PT/INR - ( 12 Dec 2021 08:40 )   PT: 20.4 sec;   INR: 1.81 ratio         PTT - ( 12 Dec 2021 08:40 )  PTT:40.6 sec  LIVER FUNCTIONS - ( 12 Dec 2021 08:40 )  Alb: 2.0 g/dL / Pro: 5.8 gm/dL / ALK PHOS: 113 U/L / ALT: 31 U/L / AST: 47 U/L / GGT: x           BCx Strep 4/4 bottles    RADIOLOGY & ADDITIONAL STUDIES:  CT ascites and +PVT

## 2021-12-12 NOTE — PROGRESS NOTE ADULT - ASSESSMENT
56M EtOH cirrhosis, ascites, chronic/subacute portal vein thrombosis. Admitted with tense ascites and sepsis, likely due to SBP. S/p large volume paracentesis (no culture or cell count sent).    Rec:  -f/u BCx final results  -cont abx  -cont lasix 40 bid and spirono 100 bid for now  -2g Na diet  -no current signs of hepatic encephalopathy  -needs EGD to r/o varices, will schedule tomorrow  -NPO p MN  -r/b/a of EGD discussed  -will need anticoagulation for PV thrombosis, will wait until high-risk varices ruled out first  -avoid non selective beta blockers and will likely need SBP ppx after discharge  -EtOH cessation (last drink 10/31/2021).  -d/w pt in detail, 25 min spent at bedside

## 2021-12-12 NOTE — PROGRESS NOTE ADULT - ASSESSMENT
55 y/o male with h/o alcoholic fatty liver, pleural effusion chronic partial portal vein thrombosis was admitted on 12/10 for worsening abdominal distention and pain. He was recently hospitalized (11/23 - 11/25/2021) for increased abdominal distention, large ascites with moderate left pleural effusion and questionable partial thrombosis of portal vein. Paracentesis removed 7.3 liters. The patient was referred to the [facility] by his Gastroenterologist for further evaluation for worsening ascites and abdominal pain which began the day PTA. In ER he received ceftriaxone and vancomycin IV.     1. Sepsis with strep spp ?source ?abdomen ?heart. Probable SBP. Alcoholic cirrhosis.  -leukocytosis  -large ascites   -BC x 2 noted  -peritoneal fluid culture is negative to date  -on vancomycin 1 gm IV q12h and unasyn 3 gm IV q6h # 2  -tolerating abx well so far; no side effects noted  -obtain vancomycin trough level  -consider GI evaluation  -obtain cardiac Echo  -continue abx coverage   -repeat BC x 2 in AM  -monitor temps  -f/u CBC  -supportive care  2. Other issues:   -care per medicine

## 2021-12-12 NOTE — DIETITIAN INITIAL EVALUATION ADULT. - PERTINENT MEDS FT
MEDICATIONS  (STANDING):  ampicillin/sulbactam  IVPB 3 Gram(s) IV Intermittent every 6 hours  furosemide    Tablet 40 milliGRAM(s) Oral two times a day  spironolactone 100 milliGRAM(s) Oral two times a day  vancomycin  IVPB 1000 milliGRAM(s) IV Intermittent every 12 hours    MEDICATIONS  (PRN):  acetaminophen     Tablet .. 650 milliGRAM(s) Oral every 6 hours PRN Temp greater or equal to 38C (100.4F), Mild Pain (1 - 3)  aluminum hydroxide/magnesium hydroxide/simethicone Suspension 30 milliLiter(s) Oral every 4 hours PRN Dyspepsia  ondansetron Injectable 4 milliGRAM(s) IV Push every 8 hours PRN Nausea and/or Vomiting

## 2021-12-12 NOTE — DIETITIAN INITIAL EVALUATION ADULT. - PHYSCIAL ASSESSMENT
robyn score of 19; no PU documented  no BM documented; abd distended large abd from ascites/obese/other (specify)

## 2021-12-12 NOTE — PROGRESS NOTE ADULT - ASSESSMENT
55 y/o M PMHx significant for alcoholic fatty liver, history of drinking beer on a daily basis for last 30 years (last drink was reportedly on 10/31/2021)  who was admitted from 11/23 - 11/25/2021 after being evaluated by his PCP for symptoms of increased abdominal distention, anasarca shortness of breath, scleral icterus, and a 40 pound weight gain c/b scrotal edema in the course of one month. CTAP in the ED at the time revealed large ascites with moderate left pleural effusion and questionable partial thrombosis of portal vein. LFTs were elevated. The patient was admitted and underwent a diagnostic and therapeutic paracentesis; had 7.3 liters removed and was given 2 units albumin post paracentesis. The patient was evaluated by GI, underwent an MR of the abdomen to further evaluate for portal vein thrombosis. The case was reportedly d/w Radiology and GI and given likely chronic appearance of partial portal vein thrombosis the  plan was to discharge without anticoagulation until EGD was done to assess for variceal disease.  Today the patient was referred to the [facility] by his Gastroenterologist for further evaluation and management of progressively worsening ascites and abdominal pain which began yesterday. Of note the patient is now s/p diagnostic and therapeutic paracentesis by Interventional Radiology. Per Gastroenterology additional plans are for MRI/endoscopy to further assess for portal vein thrombosis.     #Sepsis.  probable SBP  - BCx grew Gram positive cocci  - Lactate 5.1->3.1  - WBC 11.23  - No IVF given due to Ascites and hemodynamically stable  - s/p Rocephin 1000mg in ED  - ID recommends: c/w Unasyn and Vanco (now day 2), f/u TTE, repeat blood CX x 2 in AM  - GI recommends: perform EGD (12/13) to r/o varices and follow w/anticoagulation, c/w lasix 40 bid and spirono 100 bid for now, avoid non selective beta blockers and will likely need SBP ppx after discharge    #Large Ascites 2/2 to decompensated cirrhosis  - patient is s/p diagnostic and therapeutic paracentesis by interventional radiology   - Paracentesis is cx negative  - cont. Lasix 40mg po dialy  - cont. Spironolactone 100mg po daily  - C/w fluid restriction  - C/w ID recommendations (see above)  - C/w GI recommendations (see above)    #Vte ppx  - IMPROVE Vte Risk Score is 0  - cont. SCDs for now    *Case discussed with Dr. Vaz

## 2021-12-12 NOTE — PROGRESS NOTE ADULT - SUBJECTIVE AND OBJECTIVE BOX
Date of service: 12-12-21 @ 08:54    Lying in bed in NAD  Abdomen feels distended  Denies chills  Mild abdominal discomfort  Has low grade fever    ROS: denies dizziness, no HA, no SOB or cough, no abdominal pain, no diarrhea or constipation; no dysuria, no legs pain, no rashes    MEDICATIONS  (STANDING):  ampicillin/sulbactam  IVPB 3 Gram(s) IV Intermittent every 6 hours  furosemide    Tablet 40 milliGRAM(s) Oral two times a day  spironolactone 100 milliGRAM(s) Oral two times a day  vancomycin  IVPB 1000 milliGRAM(s) IV Intermittent every 12 hours    Vital Signs Last 24 Hrs  T(C): 37.2 (12 Dec 2021 00:22), Max: 37.3 (11 Dec 2021 20:03)  T(F): 99 (12 Dec 2021 00:22), Max: 99.1 (11 Dec 2021 20:03)  HR: 84 (12 Dec 2021 00:22) (82 - 90)  BP: 109/66 (12 Dec 2021 00:22) (107/52 - 123/61)  BP(mean): 62 (11 Dec 2021 09:52) (62 - 62)  RR: 16 (12 Dec 2021 00:22) (16 - 16)  SpO2: 95% (12 Dec 2021 00:22) (94% - 97%)     Physical exam:    Constitutional:  No acute distress  HEENT: NC/AT, EOMI, PERRLA, conjunctivae clear; ears and nose atraumatic  Neck: supple; thyroid not palpable  Back: no tenderness  Respiratory: respiratory effort normal; clear to auscultation  Cardiovascular: S1S2 regular, no murmurs  Abdomen: soft, has epigastric tender, distended, positive BS  Genitourinary: no suprapubic tenderness  Lymphatic: no LN palpable  Musculoskeletal: no muscle tenderness, no joint swelling or tenderness  Extremities: no pedal edema  Neurological/ Psychiatric: AxOx3, judgement and insight normal; moving all extremities  Skin: no rashes; no palpable lesions    Labs: reviewed                        11.5 11.23 )-----------( 60       ( 11 Dec 2021 09:19 )             33.0     12-11    134<L>  |  104  |  20  ----------------------------<  87  4.3   |  25  |  0.76    Ca    7.8<L>      11 Dec 2021 09:19    TPro  5.4<L>  /  Alb  2.0<L>  /  TBili  5.3<H>  /  DBili  x   /  AST  45<H>  /  ALT  30  /  AlkPhos  105  12-11     LIVER FUNCTIONS - ( 11 Dec 2021 09:19 )  Alb: 2.0 g/dL / Pro: 5.4 gm/dL / ALK PHOS: 105 U/L / ALT: 30 U/L / AST: 45 U/L / GGT: x             Culture - Body Fluid with Gram Stain (collected 10 Dec 2021 15:36)  Source: .Body Fluid Peritoneal Fluid  Gram Stain (11 Dec 2021 06:23):    polymorphonuclear leukocytes seen    No organisms seen    by cytocentrifuge    Culture - Blood (collected 10 Dec 2021 13:55)  Source: .Blood Blood-Peripheral  Gram Stain (11 Dec 2021 09:55):    Growth in aerobic and anaerobic bottles: Gram Positive Cocci in Pairs and    Chains  Preliminary Report (11 Dec 2021 09:55):    Growth in aerobic and anaerobic bottles: Gram Positive Cocci in Pairs and    Chains    Culture - Blood (collected 10 Dec 2021 13:55)  Source: .Blood Blood-Peripheral  Gram Stain (11 Dec 2021 09:56):    Growth in anaerobic bottle: Gram positive cocci in pairs    Growth in aerobic bottle: Gram Positive Cocci in Pairs and Chains  Preliminary Report (11 Dec 2021 09:56):    Growth in anaerobic bottle: Gram positive cocci in pairs    Growth in aerobic bottle: Gram Positive Cocci in Pairs and Chains  Organism: Blood Culture PCR (11 Dec 2021 10:04)      -  Streptococcus sp. (Not Grp A, B or S pneumoniae): Detec      Method Type: PCR        COVID-19 PCR: NotDetec (12-10-21 @ 13:55)          Radiology: all available radiological tests reviewed    Advanced directives addressed: full resuscitation

## 2021-12-13 LAB
-  CEFTRIAXONE: SIGNIFICANT CHANGE UP
-  CLINDAMYCIN: SIGNIFICANT CHANGE UP
-  ERYTHROMYCIN: SIGNIFICANT CHANGE UP
-  LEVOFLOXACIN: SIGNIFICANT CHANGE UP
-  PENICILLIN: SIGNIFICANT CHANGE UP
-  VANCOMYCIN: SIGNIFICANT CHANGE UP
ALBUMIN SERPL ELPH-MCNC: 1.8 G/DL — LOW (ref 3.3–5)
ALP SERPL-CCNC: 117 U/L — SIGNIFICANT CHANGE UP (ref 40–120)
ALT FLD-CCNC: 32 U/L — SIGNIFICANT CHANGE UP (ref 12–78)
ANION GAP SERPL CALC-SCNC: 5 MMOL/L — SIGNIFICANT CHANGE UP (ref 5–17)
AST SERPL-CCNC: 49 U/L — HIGH (ref 15–37)
BASOPHILS # BLD AUTO: 0.05 K/UL — SIGNIFICANT CHANGE UP (ref 0–0.2)
BASOPHILS NFR BLD AUTO: 1 % — SIGNIFICANT CHANGE UP (ref 0–2)
BILIRUB SERPL-MCNC: 2.5 MG/DL — HIGH (ref 0.2–1.2)
BUN SERPL-MCNC: 14 MG/DL — SIGNIFICANT CHANGE UP (ref 7–23)
CALCIUM SERPL-MCNC: 7.5 MG/DL — LOW (ref 8.5–10.1)
CHLORIDE SERPL-SCNC: 106 MMOL/L — SIGNIFICANT CHANGE UP (ref 96–108)
CO2 SERPL-SCNC: 25 MMOL/L — SIGNIFICANT CHANGE UP (ref 22–31)
CREAT SERPL-MCNC: 0.64 MG/DL — SIGNIFICANT CHANGE UP (ref 0.5–1.3)
CULTURE RESULTS: SIGNIFICANT CHANGE UP
EOSINOPHIL # BLD AUTO: 0.24 K/UL — SIGNIFICANT CHANGE UP (ref 0–0.5)
EOSINOPHIL NFR BLD AUTO: 5 % — SIGNIFICANT CHANGE UP (ref 0–6)
GLUCOSE SERPL-MCNC: 93 MG/DL — SIGNIFICANT CHANGE UP (ref 70–99)
HCT VFR BLD CALC: 35.7 % — LOW (ref 39–50)
HGB BLD-MCNC: 12.2 G/DL — LOW (ref 13–17)
IMM GRANULOCYTES NFR BLD AUTO: 0.4 % — SIGNIFICANT CHANGE UP (ref 0–1.5)
LYMPHOCYTES # BLD AUTO: 1.18 K/UL — SIGNIFICANT CHANGE UP (ref 1–3.3)
LYMPHOCYTES # BLD AUTO: 24.6 % — SIGNIFICANT CHANGE UP (ref 13–44)
MAGNESIUM SERPL-MCNC: 1.9 MG/DL — SIGNIFICANT CHANGE UP (ref 1.6–2.6)
MCHC RBC-ENTMCNC: 34 PG — SIGNIFICANT CHANGE UP (ref 27–34)
MCHC RBC-ENTMCNC: 34.2 GM/DL — SIGNIFICANT CHANGE UP (ref 32–36)
MCV RBC AUTO: 99.4 FL — SIGNIFICANT CHANGE UP (ref 80–100)
METHOD TYPE: SIGNIFICANT CHANGE UP
METHOD TYPE: SIGNIFICANT CHANGE UP
MONOCYTES # BLD AUTO: 0.72 K/UL — SIGNIFICANT CHANGE UP (ref 0–0.9)
MONOCYTES NFR BLD AUTO: 15 % — HIGH (ref 2–14)
NEUTROPHILS # BLD AUTO: 2.59 K/UL — SIGNIFICANT CHANGE UP (ref 1.8–7.4)
NEUTROPHILS NFR BLD AUTO: 54 % — SIGNIFICANT CHANGE UP (ref 43–77)
ORGANISM # SPEC MICROSCOPIC CNT: SIGNIFICANT CHANGE UP
PHOSPHATE SERPL-MCNC: 3.4 MG/DL — SIGNIFICANT CHANGE UP (ref 2.5–4.5)
PLATELET # BLD AUTO: 72 K/UL — LOW (ref 150–400)
POTASSIUM SERPL-MCNC: 4.1 MMOL/L — SIGNIFICANT CHANGE UP (ref 3.5–5.3)
POTASSIUM SERPL-SCNC: 4.1 MMOL/L — SIGNIFICANT CHANGE UP (ref 3.5–5.3)
PROT SERPL-MCNC: 5.5 GM/DL — LOW (ref 6–8.3)
RBC # BLD: 3.59 M/UL — LOW (ref 4.2–5.8)
RBC # FLD: 15.1 % — HIGH (ref 10.3–14.5)
SODIUM SERPL-SCNC: 136 MMOL/L — SIGNIFICANT CHANGE UP (ref 135–145)
SPECIMEN SOURCE: SIGNIFICANT CHANGE UP
VANCOMYCIN TROUGH SERPL-MCNC: 9.4 UG/ML — LOW (ref 10–20)
WBC # BLD: 4.8 K/UL — SIGNIFICANT CHANGE UP (ref 3.8–10.5)
WBC # FLD AUTO: 4.8 K/UL — SIGNIFICANT CHANGE UP (ref 3.8–10.5)

## 2021-12-13 PROCEDURE — 93306 TTE W/DOPPLER COMPLETE: CPT | Mod: 26

## 2021-12-13 PROCEDURE — 99233 SBSQ HOSP IP/OBS HIGH 50: CPT | Mod: GC

## 2021-12-13 RX ADMIN — AMPICILLIN SODIUM AND SULBACTAM SODIUM 200 GRAM(S): 250; 125 INJECTION, POWDER, FOR SUSPENSION INTRAMUSCULAR; INTRAVENOUS at 12:55

## 2021-12-13 RX ADMIN — Medication 40 MILLIGRAM(S): at 11:43

## 2021-12-13 RX ADMIN — AMPICILLIN SODIUM AND SULBACTAM SODIUM 200 GRAM(S): 250; 125 INJECTION, POWDER, FOR SUSPENSION INTRAMUSCULAR; INTRAVENOUS at 05:34

## 2021-12-13 RX ADMIN — SPIRONOLACTONE 100 MILLIGRAM(S): 25 TABLET, FILM COATED ORAL at 11:43

## 2021-12-13 RX ADMIN — AMPICILLIN SODIUM AND SULBACTAM SODIUM 200 GRAM(S): 250; 125 INJECTION, POWDER, FOR SUSPENSION INTRAMUSCULAR; INTRAVENOUS at 18:33

## 2021-12-13 RX ADMIN — AMPICILLIN SODIUM AND SULBACTAM SODIUM 200 GRAM(S): 250; 125 INJECTION, POWDER, FOR SUSPENSION INTRAMUSCULAR; INTRAVENOUS at 00:05

## 2021-12-13 RX ADMIN — Medication 250 MILLIGRAM(S): at 11:43

## 2021-12-13 NOTE — PROGRESS NOTE ADULT - NUTRITIONAL ASSESSMENT
This patient has been assessed with a concern for Malnutrition and has been determined to have a diagnosis/diagnoses of Severe protein-calorie malnutrition.    This patient is being managed with:   Diet NPO after Midnight-     NPO Start Date: 12-Dec-2021   NPO Start Time: 23:59  Entered: Dec 12 2021  1:30PM    Diet DASH/TLC-  Sodium & Cholesterol Restricted  Entered: Dec 10 2021  3:37PM

## 2021-12-13 NOTE — PROGRESS NOTE ADULT - SUBJECTIVE AND OBJECTIVE BOX
EGD shows trace varices, mild portal gastropathy.    Would repeat paracentesis tomorrow then can start anticoagulation for PV thrombosis.    Resume 2 g Na diet.    Full note to follow.

## 2021-12-13 NOTE — PROGRESS NOTE ADULT - ATTENDING COMMENTS
GI input noted- d/w Dr. ORR  no pain.  icteric  tense abdominal ascites  no peripheral stigmata of SBE    imp- 55 yo man with etoh -> cirrhosis p/w decompensated cirrhosis (SBP) -> sepsis. we suspect this indeed is SBP as ABx's timing vs paracentesis not ideal. doubt SBE as source of strep bacteremia but will apply [facility] criteria. found to have PVT.  given low risk of bleeding based on EGD, will pursue anticoagulation after another large volume paracentesis.  will connect to liver tx team as if he remains abstinent he needs to be considered.  will need secondary prophylaxis for sbp if we find no oterh source of bacteremia.
As above, pt seen and examined with house staff and treatment plan formulated on rounds    See Above and Attending Note
As above, pt seen and examined with house staff and treatment plan formulated on rounds    See Above and Attending Note

## 2021-12-13 NOTE — PROGRESS NOTE ADULT - SUBJECTIVE AND OBJECTIVE BOX
Date of service: 12-13-21 @ 13:02    Lying in bed in NAD  No chills  Abdomen feels distended  Has low grade fever    ROS: no chills; denies dizziness, no HA, no SOB or cough, no abdominal pain, no dysuria, no legs pain, no rashes    MEDICATIONS  (STANDING):  ampicillin/sulbactam  IVPB 3 Gram(s) IV Intermittent every 6 hours  furosemide    Tablet 40 milliGRAM(s) Oral two times a day  spironolactone 100 milliGRAM(s) Oral two times a day    Vital Signs Last 24 Hrs  T(C): 36.7 (13 Dec 2021 08:54), Max: 37.3 (13 Dec 2021 00:15)  T(F): 98.1 (13 Dec 2021 08:54), Max: 99.1 (13 Dec 2021 00:15)  HR: 74 (13 Dec 2021 08:54) (74 - 81)  BP: 108/72 (13 Dec 2021 08:54) (108/72 - 116/73)  BP(mean): --  RR: 16 (13 Dec 2021 08:54) (16 - 17)  SpO2: 95% (13 Dec 2021 08:54) (95% - 96%)     Physical exam:    Constitutional:  No acute distress  HEENT: NC/AT, EOMI, PERRLA, conjunctivae clear; ears and nose atraumatic  Neck: supple; thyroid not palpable  Back: no tenderness  Respiratory: respiratory effort normal; clear to auscultation  Cardiovascular: S1S2 regular, no murmurs  Abdomen: soft, has epigastric tender, distended, positive BS  Genitourinary: no suprapubic tenderness  Lymphatic: no LN palpable  Musculoskeletal: no muscle tenderness, no joint swelling or tenderness  Extremities: no pedal edema  Neurological/ Psychiatric: AxOx3, judgement and insight normal; moving all extremities  Skin: no rashes; no palpable lesions    Labs: reviewed                        12.2   4.80  )-----------( 72       ( 13 Dec 2021 08:27 )             35.7     12-13    136  |  106  |  14  ----------------------------<  93  4.1   |  25  |  0.64    Ca    7.5<L>      13 Dec 2021 08:27  Phos  3.4     12-13  Mg     1.9     12-13    TPro  5.5<L>  /  Alb  1.8<L>  /  TBili  2.5<H>  /  DBili  x   /  AST  49<H>  /  ALT  32  /  AlkPhos  117  12-13    Vancomycin Level, Trough: 9.4 ug/mL (12-13 @ 09:47)  Vancomycin Level, Trough: 10.3 ug/mL (12-12 @ 19:01)                          11.5   11.23 )-----------( 60       ( 11 Dec 2021 09:19 )             33.0     12-11    134<L>  |  104  |  20  ----------------------------<  87  4.3   |  25  |  0.76    Ca    7.8<L>      11 Dec 2021 09:19    TPro  5.4<L>  /  Alb  2.0<L>  /  TBili  5.3<H>  /  DBili  x   /  AST  45<H>  /  ALT  30  /  AlkPhos  105  12-11     LIVER FUNCTIONS - ( 11 Dec 2021 09:19 )  Alb: 2.0 g/dL / Pro: 5.4 gm/dL / ALK PHOS: 105 U/L / ALT: 30 U/L / AST: 45 U/L / GGT: x             Culture - Fungal, Body Fluid (collected 10 Dec 2021 15:36)  Source: .Body Fluid Peritoneal Fluid  Preliminary Report (13 Dec 2021 09:46):    Testing in progress    Culture - Body Fluid with Gram Stain (collected 10 Dec 2021 15:36)  Source: .Body Fluid Peritoneal Fluid  Gram Stain (11 Dec 2021 06:23):    polymorphonuclear leukocytes seen    No organisms seen    by cytocentrifuge  Preliminary Report (11 Dec 2021 17:19):    No growth    Culture - Blood (collected 10 Dec 2021 13:55)  Source: .Blood Blood-Peripheral  Gram Stain (11 Dec 2021 09:55):    Growth in aerobic and anaerobic bottles: Gram Positive Cocci in Pairs and    Chains  Final Report (13 Dec 2021 09:28):    Growth in aerobic and anaerobic bottles: Streptococcus    salivarius/vestibularis group  Organism: Streptococcus salivarius/vestibularis group  Streptococcus salivarius/vestibularis group (13 Dec 2021 09:30)  Organism: Streptococcus salivarius/vestibularis group (13 Dec 2021 09:30)      -  Ceftriaxone: S 0.016      -  Penicillin: S 0.047      Method Type: ETEST  Organism: Streptococcus salivarius/vestibularis group (13 Dec 2021 09:30)      -  Clindamycin: S      -  Erythromycin: S      -  Levofloxacin: S      -  Vancomycin: S      Method Type: KB    Culture - Blood (collected 10 Dec 2021 13:55)  Source: .Blood Blood-Peripheral  Gram Stain (11 Dec 2021 09:56):    Growth in anaerobic bottle: Gram positive cocci in pairs    Growth in aerobic bottle: Gram Positive Cocci in Pairs and Chains  Final Report (12 Dec 2021 09:13):    Growth in aerobic and anaerobic bottles: Streptococcus    salivarius/vestibularis group    "Susceptibilities not performed"  Organism: Blood Culture PCR (12 Dec 2021 09:13)  Organism: Blood Culture PCR (12 Dec 2021 09:13)      -  Streptococcus sp. (Not Grp A, B or S pneumoniae): Detec      Method Type: PCR    COVID-19 PCR: NotDetec (12-10-21 @ 13:55)    Radiology: all available radiological tests reviewed    < from: CT Chest w/ IV Cont (12.10.21 @ 15:08) >  Findings suggesting cirrhosis withportal hypertension and large amount   of ascites. Portal vein thrombosis is again noted.    Moderate left and mild right pleural effusions which have not   significantly changed. Atelectatic lung. No focal consolidation seen.  < end of copied text >    < from: TTE Echo Complete w/o Contrast w/ Doppler (12.13.21 @ 10:48) >   Mitral Valve   Mild fibrocalcific changes noted to the mitral valve leaflets with   preserved leaflet excursion.   Aortic Valve   The aortic valve is trileaflet with thin pliable leaflets.   Tricuspid Valve   The tricuspid valve leaflets are thin and pliable; valve motion is   normal.   Trace tricuspid valve regurgitation is present.   Pulmonic Valve   Pulmonic valve not well seen, appears grossly normal.  < end of copied text >      Advanced directives addressed: full resuscitation

## 2021-12-13 NOTE — PROGRESS NOTE ADULT - ASSESSMENT
55 y/o M PMHx significant for alcoholic fatty liver, history of drinking beer on a daily basis for last 30 years, here for further evaluation and management of progressively worsening ascites and abdominal pain. He is admitted for:    #Streptococcal Bacteremia, source under investigation   - Lactate 5.1->3.1  - WBC 11.23, Now 4.8  - s/p Rocephin 1000mg in ED  - BCx grew Gram positive cocci  - TTE: Limited study to assess for endocarditis. Mild fibrocalcific changes to mv  - EGD shows trace varices, mild portal gastropathy.  - Will order f/u blood cx  - ID recommends: c/w Unasyn and Vanco (now day 3), f/u TTE, repeat blood CX x 2 in AM  - GI recommends: c/w lasix 40 bid and spirono 100 bid for now, avoid non selective beta blockers, Would repeat paracentesis tomorrow then can start anticoagulation for PV thrombosis.      #Large Ascites 2/2 to decompensated cirrhosis  - patient is s/p diagnostic and therapeutic paracentesis by interventional radiology   - Paracentesis is cx negative  - cont. Lasix 40mg po dialy  - cont. Spironolactone 100mg po daily  - C/w fluid restriction  - C/w ID recommendations (see above)  - C/w GI recommendations (see above)    #Vte ppx  - IMPROVE Vte Risk Score is 0  - cont. SCDs for now    *Case discussed with Dr. Page

## 2021-12-13 NOTE — PROGRESS NOTE ADULT - SUBJECTIVE AND OBJECTIVE BOX
HPI:  55 y/o M PMHx significant for alcoholic fatty liver, history of drinking beer on a daily basis for last 30 years (last drink was reportedly on 10/31/2021)  who was admitted from 11/23 - 11/25/2021 after being evaluated by his PCP for symptoms of increased abdominal distention, anasarca shortness of breath, scleral icterus, and a 40 pound weight gain c/b scrotal edema in the course of one month. CTAP in the ED at the time revealed large ascites with moderate left pleural effusion and questionable partial thrombosis of portal vein. LFTs were elevated. The patient was admitted and underwent a diagnostic and therapeutic paracentesis; had 7.3 liters removed and was given 2 units albumin post paracentesis. The patient was evaluated by GI, underwent an MR of the abdomen to further evaluate for portal vein thrombosis. The case was reportedly d/w Radiology and GI and given likely chronic appearance of partial portal vein thrombosis the  plan was to discharge without anticoagulation until EGD was done to assess for variceal disease.  Today the patient was referred to the [facility] by his Gastroenterologist for further evaluation and management of progressively worsening ascites and abdominal pain which began yesterday. Of note the patient is now s/p diagnostic and therapeutic paracentesis by Interventional Radiology. Per Gastroenterology additional plans are for MRI/endoscopy to further assess for portal vein thrombosis.   Labs => PLT 91, INR 1.65,LA 5.1 -> 3.1, Alb 2.2, TBili 6.0, , AST 59, proBNP 185, TnI 24.36  CT Chest/ABD/Pelvis => Findings suggesting cirrhosis with portal hypertension and large amount   of ascites. Portal vein thrombosis is again noted. Moderate left and mild right pleural effusions which have not significantly changed. Atelectatic lung. No focal consolidation seen. In the ED the patient was given [id number] IVPB x 1, Vancomycin 1750mg IVPB x 1, Ibuprofen 600mg PO x 1, and NS x 1L.  MR Abdomen w/w/o =>  Nondiagnostic exam for portal vein thrombosis secondary to artifact from large ascites and patient unable to maintain adequate breath holds. However, in conjunction with prior CT, findings likely represent nonocclusive chronic portal vein thrombosis. Cirrhosis, large ascites, varices.     SUBJECTIVE:   No acute overnight events. Patient seen and examined at bedside in the AM, waiting to go to EGD. Reviewed plan with pt; all questions answered. Pt does not complain of abdominal pain at this time. No f/c, cp, n/v, changes in bm. No additional symptoms or complaints reported.    REVIEW OF SYSTEMS:  ROS as stated above    Vital Signs Last 24 Hrs  T(C): 36.4 (13 Dec 2021 17:01), Max: 37.3 (13 Dec 2021 00:15)  T(F): 97.6 (13 Dec 2021 17:01), Max: 99.1 (13 Dec 2021 00:15)  HR: 68 (13 Dec 2021 17:01) (68 - 81)  BP: 119/70 (13 Dec 2021 17:01) (108/72 - 119/70)  BP(mean): 79 (13 Dec 2021 17:01) (79 - 79)  RR: 18 (13 Dec 2021 17:01) (16 - 18)  SpO2: 100% (13 Dec 2021 17:01) (95% - 100%)    PHYSICAL EXAM:  Constitutional: NAD, awake and alert, well-developed  HEENT: +jaundice eyes PERR, EOMI, Normal Hearing, MMM  Neck: Soft and supple, No LAD, No JVD  Respiratory: Breath sounds are clear bilaterally, No wheezing, rales or rhonchi  Cardiovascular: S1 and S2, regular rate and rhythm, no Murmurs, gallops or rubs  Gastrointestinal: Bowel Sounds present, soft, +distended, nontender  Extremities: Trace edema b/l  Vascular: 2+ peripheral pulses  Neurological: A/O x 3, no focal deficits  Skin: +spider angiomas in the chest area    MEDICATIONS:  MEDICATIONS:  MEDICATIONS  (STANDING):  ampicillin/sulbactam  IVPB 3 Gram(s) IV Intermittent every 6 hours  furosemide    Tablet 40 milliGRAM(s) Oral two times a day  spironolactone 100 milliGRAM(s) Oral two times a day    MEDICATIONS  (PRN):  acetaminophen     Tablet .. 650 milliGRAM(s) Oral every 6 hours PRN Temp greater or equal to 38C (100.4F), Mild Pain (1 - 3)  aluminum hydroxide/magnesium hydroxide/simethicone Suspension 30 milliLiter(s) Oral every 4 hours PRN Dyspepsia  ondansetron Injectable 4 milliGRAM(s) IV Push every 8 hours PRN Nausea and/or Vomiting      LABS: All Labs Reviewed:                        12.2   4.80  )-----------( 72       ( 13 Dec 2021 08:27 )             35.7     12-13    136  |  106  |  14  ----------------------------<  93  4.1   |  25  |  0.64    Ca    7.5<L>      13 Dec 2021 08:27  Phos  3.4     12-13  Mg     1.9     12-13    TPro  5.5<L>  /  Alb  1.8<L>  /  TBili  2.5<H>  /  DBili  x   /  AST  49<H>  /  ALT  32  /  AlkPhos  117  12-13    PT/INR - ( 12 Dec 2021 08:40 )   PT: 20.4 sec;   INR: 1.81 ratio         PTT - ( 12 Dec 2021 08:40 )  PTT:40.6 sec      Blood Culture: 12-10 @ 15:36  Organism --  Gram Stain Blood -- Gram Stain   polymorphonuclear leukocytes seen  No organisms seen  by cytocentrifuge  Specimen Source .Body Fluid Peritoneal Fluid  Culture-Blood --    12-10 @ 13:55  Organism Blood Culture PCR  Gram Stain Blood -- Gram Stain   Growth in anaerobic bottle: Gram positive cocci in pairs  Growth in aerobic bottle: Gram Positive Cocci in Pairs and Chains  Specimen Source .Blood Blood-Peripheral  Culture-Blood --      I&O's Summary    12 Dec 2021 07:01  -  13 Dec 2021 07:00  --------------------------------------------------------  IN: 840 mL / OUT: 400 mL / NET: 440 mL      CAPILLARY BLOOD GLUCOSE      RADIOLOGY/EKG:    < from: TTE Echo Complete w/o Contrast w/ Doppler (12.13.21 @ 10:48) >   Findings     Mitral Valve   Mild fibrocalcific changes noted to the mitral valve leaflets with   preserved leaflet excursion.     Aortic Valve   The aortic valve is trileaflet with thin pliable leaflets.     Tricuspid Valve   The tricuspid valve leaflets are thin and pliable; valve motion is   normal.   Trace tricuspid valve regurgitation is present.     Pulmonic Valve   Pulmonic valve not well seen, appears grossly normal.     Left Ventricle   Limited study to assess for endocarditis.     Pericardial Effusion   No evidence of pericardial effusion.     Pleural Effusion   Pleural effusion - is present.     Miscellaneous   The IVC is not well visualized.   Ascites is noted.      < end of copied text >    < from: CT Chest/Abdomen/Pelvis w/ IV Cont (12.10.21 @ 15:08) >    IMPRESSION:  Findings suggesting cirrhosis withportal hypertension and large amount   of ascites. Portal vein thrombosis is again noted.    Moderate left and mild right pleural effusions which have not   significantly changed. Atelectatic lung. No focal consolidation seen.    < end of copied text >

## 2021-12-13 NOTE — PROGRESS NOTE ADULT - ASSESSMENT
55 y/o male with h/o alcoholic fatty liver, pleural effusion chronic partial portal vein thrombosis was admitted on 12/10 for worsening abdominal distention and pain. He was recently hospitalized (11/23 - 11/25/2021) for increased abdominal distention, large ascites with moderate left pleural effusion and questionable partial thrombosis of portal vein. Paracentesis removed 7.3 liters. The patient was referred to the [facility] by his Gastroenterologist for further evaluation for worsening ascites and abdominal pain which began the day PTA. In ER he received ceftriaxone and vancomycin IV.     1. Sepsis with strep viridans ?source ?abdomen ?heart. Probable SBP. Alcoholic cirrhosis.  -leukocytosis  -large ascites   -BC x 2 noted  -peritoneal fluid culture is negative to date  -on vancomycin 1 gm IV q12h and unasyn 3 gm IV q6h # 3  -tolerating abx well so far; no side effects noted  -vancomycin trough level low therapeutic  -d/c vancomycin  -GI evaluation  -cardiac Echo - no vegetations  -continue abx coverage with unasyn  -repeat BC x 2 collected  -monitor temps  -f/u CBC  -supportive care  2. Other issues:   -care per medicine

## 2021-12-14 ENCOUNTER — TRANSCRIPTION ENCOUNTER (OUTPATIENT)
Age: 56
End: 2021-12-14

## 2021-12-14 VITALS
HEART RATE: 71 BPM | DIASTOLIC BLOOD PRESSURE: 69 MMHG | TEMPERATURE: 98 F | RESPIRATION RATE: 18 BRPM | OXYGEN SATURATION: 97 % | SYSTOLIC BLOOD PRESSURE: 110 MMHG

## 2021-12-14 LAB
MAGNESIUM SERPL-MCNC: 2 MG/DL — SIGNIFICANT CHANGE UP (ref 1.6–2.6)
PHOSPHATE SERPL-MCNC: 3.5 MG/DL — SIGNIFICANT CHANGE UP (ref 2.5–4.5)
SARS-COV-2 RNA SPEC QL NAA+PROBE: SIGNIFICANT CHANGE UP

## 2021-12-14 PROCEDURE — 99239 HOSP IP/OBS DSCHRG MGMT >30: CPT | Mod: GC

## 2021-12-14 PROCEDURE — 99221 1ST HOSP IP/OBS SF/LOW 40: CPT

## 2021-12-14 RX ORDER — CEFTRIAXONE 500 MG/1
2000 INJECTION, POWDER, FOR SOLUTION INTRAMUSCULAR; INTRAVENOUS EVERY 24 HOURS
Refills: 0 | Status: DISCONTINUED | OUTPATIENT
Start: 2021-12-14 | End: 2021-12-14

## 2021-12-14 RX ORDER — APIXABAN 2.5 MG/1
2 TABLET, FILM COATED ORAL
Qty: 28 | Refills: 0
Start: 2021-12-14 | End: 2021-12-20

## 2021-12-14 RX ORDER — FUROSEMIDE 40 MG
2 TABLET ORAL
Qty: 0 | Refills: 0 | DISCHARGE
Start: 2021-12-14 | End: 2022-01-12

## 2021-12-14 RX ORDER — APIXABAN 2.5 MG/1
1 TABLET, FILM COATED ORAL
Qty: 70 | Refills: 0
Start: 2021-12-14 | End: 2022-01-17

## 2021-12-14 RX ORDER — FUROSEMIDE 40 MG
1 TABLET ORAL
Qty: 60 | Refills: 0
Start: 2021-12-14 | End: 2022-01-12

## 2021-12-14 RX ORDER — SPIRONOLACTONE 25 MG/1
1 TABLET, FILM COATED ORAL
Qty: 60 | Refills: 0
Start: 2021-12-14 | End: 2022-01-12

## 2021-12-14 RX ADMIN — AMPICILLIN SODIUM AND SULBACTAM SODIUM 200 GRAM(S): 250; 125 INJECTION, POWDER, FOR SUSPENSION INTRAMUSCULAR; INTRAVENOUS at 05:54

## 2021-12-14 RX ADMIN — CEFTRIAXONE 100 MILLIGRAM(S): 500 INJECTION, POWDER, FOR SOLUTION INTRAMUSCULAR; INTRAVENOUS at 10:48

## 2021-12-14 RX ADMIN — AMPICILLIN SODIUM AND SULBACTAM SODIUM 200 GRAM(S): 250; 125 INJECTION, POWDER, FOR SUSPENSION INTRAMUSCULAR; INTRAVENOUS at 00:13

## 2021-12-14 NOTE — DISCHARGE NOTE PROVIDER - NSDCACTIVITY_GEN_ALL_CORE
Return to Work/School allowed/Showering allowed/Stairs allowed/Walking - Indoors allowed/Walking - Outdoors allowed

## 2021-12-14 NOTE — PROGRESS NOTE ADULT - ASSESSMENT
55 y/o male with h/o alcoholic fatty liver, pleural effusion chronic partial portal vein thrombosis was admitted on 12/10 for worsening abdominal distention and pain. He was recently hospitalized (11/23 - 11/25/2021) for increased abdominal distention, large ascites with moderate left pleural effusion and questionable partial thrombosis of portal vein. Paracentesis removed 7.3 liters. The patient was referred to the [facility] by his Gastroenterologist for further evaluation for worsening ascites and abdominal pain which began the day PTA. In ER he received ceftriaxone and vancomycin IV.     1. Sepsis with strep viridans ?source ?abdomen ?heart. Probable SBP. Alcoholic cirrhosis.  -leukocytosis resolved  -large ascites   -ascites fluid shows no growth  -concern about possible endocarditis is raised even though cardiac echo did not show vegetations  -BC x 2 noted  -peritoneal fluid culture is negative to date  -s/p vancomycin 1 gm IV q12h   -on unasyn 3 gm IV q6h # 4  -tolerating abx well so far; no side effects noted  -GI evaluation  -cardiac Echo - no vegetations  -continue abx coverage  -plan for home IV abx therapy for several weeks  -f/u repeat BC x 2   -monitor temps  -f/u CBC  -supportive care  2. Other issues:   -care per medicine

## 2021-12-14 NOTE — PROGRESS NOTE ADULT - SUBJECTIVE AND OBJECTIVE BOX
GI     HPI:  s/p additional 7.5 L paracentesis  midline for abx inserted  feels improved  no abd pain  no n/v  no melena  abiola PO    ROS  As above  Otherwise unremarkable, all systems reviewed    PE:  Vital Signs Last 24 Hrs  T(C): 36.6 (14 Dec 2021 09:36), Max: 36.9 (13 Dec 2021 20:28)  T(F): 97.8 (14 Dec 2021 09:36), Max: 98.5 (13 Dec 2021 20:28)  HR: 84 (14 Dec 2021 09:36) (60 - 84)  BP: 111/76 (14 Dec 2021 09:36) (108/62 - 119/70)  BP(mean): 85 (14 Dec 2021 09:36) (79 - 85)  RR: 16 (14 Dec 2021 09:36) (16 - 18)  SpO2: 96% (14 Dec 2021 09:36) (95% - 100%)    Constitutional: NAD, A+Ox3, temporal wasting  Anicteric   Respiratory: CTABL, breathing comfortably  Cardiovascular: S1 and S2, RRR  Gastrointestinal: +BS, soft, non tender, + less distended with ascites, no mass  Extremities: warm, well perfused, 2+ lower ext edema  Psychiatric: Normal mood, normal affect  Neuro: moves all extremities, grossly intact  no asterixis  Skin: No rashes or lesions    LABS:                                 13.2   5.32  )-----------( 82       ( 14 Dec 2021 09:15 )             37.3   12-14    136  |  104  |  14  ----------------------------<  91  4.5   |  28  |  0.78    Ca    7.9<L>      14 Dec 2021 09:15  Phos  3.5     12-14  Mg     2.0     12-14    TPro  6.2  /  Alb  2.2<L>  /  TBili  3.1<H>  /  DBili  x   /  AST  54<H>  /  ALT  35  /  AlkPhos  126<H>  12-14

## 2021-12-14 NOTE — DISCHARGE NOTE PROVIDER - PROVIDER TOKENS
PROVIDER:[TOKEN:[[id number]],ESTABLISHEDPATIENT:[T]],PROVIDER:[TOKEN:[[id number]],ESTABLISHEDPATIENT:[T]]

## 2021-12-14 NOTE — PROGRESS NOTE ADULT - PROVIDER SPECIALTY LIST ADULT
Family Medicine
Gastroenterology
Infectious Disease
Family Medicine
Infectious Disease
Infectious Disease
Family Medicine
Gastroenterology
Gastroenterology

## 2021-12-14 NOTE — DISCHARGE NOTE NURSING/CASE MANAGEMENT/SOCIAL WORK - NSDCPEFALRISK_GEN_ALL_CORE
For information on Fall & Injury Prevention, visit: [email or web address] OR  [email or web address] OR  https://www.cdc.gov/steadi/patient.html

## 2021-12-14 NOTE — CONSULT NOTE ADULT - SUBJECTIVE AND OBJECTIVE BOX
Chief Complaint:  Patient is a 56y old  Male who presents with a chief complaint of Abdominal pain/distention (14 Dec 2021 12:55)      HPI:  57 y/o M PMHx significant for alcoholic fatty liver, history of drinking beer on a daily basis for last 30 years (last drink was reportedly on 10/31/2021)  who was admitted from 11/23 - 11/25/2021 after being evaluated by his PCP for symptoms of increased abdominal distention, anasarca shortness of breath, scleral icterus, and a 40 pound weight gain c/b scrotal edema in the course of one month. CTAP in the ED at the time revealed large ascites. Pt s/p paracentesis 12/10. IR consulted for repeat paracentesis prior to pt discharge.     Allergies  No Known Allergies    MEDICATIONS  (STANDING):  cefTRIAXone   IVPB 2000 milliGRAM(s) IV Intermittent every 24 hours  furosemide    Tablet 40 milliGRAM(s) Oral two times a day  spironolactone 100 milliGRAM(s) Oral two times a day    MEDICATIONS  (PRN):  acetaminophen     Tablet .. 650 milliGRAM(s) Oral every 6 hours PRN Temp greater or equal to 38C (100.4F), Mild Pain (1 - 3)  aluminum hydroxide/magnesium hydroxide/simethicone Suspension 30 milliLiter(s) Oral every 4 hours PRN Dyspepsia  ondansetron Injectable 4 milliGRAM(s) IV Push every 8 hours PRN Nausea and/or Vomiting      PAST MEDICAL & SURGICAL HISTORY:  ETOH abuse    Alcoholic fatty liver    Thrombocytopenia concurrent with and due to alcoholism    Cirrhosis of liver with ascites    S/P abdominal paracentesis        FAMILY HISTORY:  No pertinent family history in first degree relatives      Vital Signs Last 24 Hrs  T(C): 36.6 (14 Dec 2021 09:36), Max: 36.9 (13 Dec 2021 20:28)  T(F): 97.8 (14 Dec 2021 09:36), Max: 98.5 (13 Dec 2021 20:28)  HR: 84 (14 Dec 2021 09:36) (60 - 84)  BP: 111/76 (14 Dec 2021 09:36) (108/62 - 119/70)  BP(mean): 85 (14 Dec 2021 09:36) (79 - 85)  RR: 16 (14 Dec 2021 09:36) (16 - 18)  SpO2: 96% (14 Dec 2021 09:36) (95% - 100%)      CBC                        13.2   5.32  )-----------( 82       ( 14 Dec 2021 09:15 )             37.3       Chemistry  12-14    136  |  104  |  14  ----------------------------<  91  4.5   |  28  |  0.78    Ca    7.9<L>      14 Dec 2021 09:15  Phos  3.5     12-14  Mg     2.0     12-14    TPro  6.2  /  Alb  2.2<L>  /  TBili  3.1<H>  /  DBili  x   /  AST  54<H>  /  ALT  35  /  AlkPhos  126<H>  12-14

## 2021-12-14 NOTE — DISCHARGE NOTE PROVIDER - NSDCCPCAREPLAN_GEN_ALL_CORE_FT
PRINCIPAL DISCHARGE DIAGNOSIS  Diagnosis: Alcoholic cirrhosis of liver with ascites  Assessment and Plan of Treatment: You came here with abdominal pain and distension and were found to have bacteria in the blood along with worsening ascites.  The imaging for your Chest/Abdomen/Pelvis showed cirrhosis with portal hypertension and large amount of ascites along with portal vein thrombosis. You were given fluids and antibiotics and admitted to the floor. Your antibiotics were adjusted and you were tapped and started on spironolactone and lasix. Infectious disease was following you. The gastroenterology service was following you and adjusted your medications and scoped you, finding trace varices, mild portal gastropathy. Your heart echo came back with mild fibrocalcific changes to the mitral valve, but no infectious vegetations on limited study. GI repeated the paracentesis then started you on anticoagulation for portal vein thrombosis. Today you are feeling better with less abdominal pain/distension and negative blood cultures. You are medically optimized and will be discharged on today, Dec 14 2021, Please follow up with gastroenterologist and primary care provider ASAP. Please take your medications as indicated. Thank you very much!      SECONDARY DISCHARGE DIAGNOSES  Diagnosis: Portal vein thrombosis  Assessment and Plan of Treatment: After workup and repeat paracentesis, you were started on anticoagulation for the clot in your veins. Please take your medications as indicated.

## 2021-12-14 NOTE — PROCEDURE NOTE - ADDITIONAL PROCEDURE DETAILS
Septation noted in the posterior aspect of the peritoneum with small amount of focal loculated fluid that did not appear different songraphically from other fluid in the peritoneum

## 2021-12-14 NOTE — CONSULT NOTE ADULT - ATTENDING COMMENTS
57yo M with ascites referred to IR for paracentesis  - Repeat covid PCR is negative  - Plan for IR paracentesis today prior to discharge

## 2021-12-14 NOTE — PROGRESS NOTE ADULT - SUBJECTIVE AND OBJECTIVE BOX
Date of service: 12-14-21 @ 09:18    Sitting in bed in NAD  Alert and verbal  Abdomen feels distended    ROS: no fever or chills; denies dizziness, no HA, no SOB or cough, no dysuria, no legs pain, no rashes    MEDICATIONS  (STANDING):  ampicillin/sulbactam  IVPB 3 Gram(s) IV Intermittent every 6 hours  furosemide    Tablet 40 milliGRAM(s) Oral two times a day  spironolactone 100 milliGRAM(s) Oral two times a day    Vital Signs Last 24 Hrs  T(C): 36.8 (14 Dec 2021 00:00), Max: 36.9 (13 Dec 2021 20:28)  T(F): 98.3 (14 Dec 2021 00:00), Max: 98.5 (13 Dec 2021 20:28)  HR: 60 (14 Dec 2021 00:00) (60 - 83)  BP: 108/62 (14 Dec 2021 00:00) (108/62 - 119/70)  BP(mean): 79 (13 Dec 2021 17:01) (79 - 79)  RR: 16 (14 Dec 2021 00:00) (16 - 18)  SpO2: 95% (14 Dec 2021 00:00) (95% - 100%)     Physical exam:    Constitutional:  No acute distress  HEENT: NC/AT, EOMI, PERRLA, conjunctivae clear; ears and nose atraumatic  Neck: supple; thyroid not palpable  Back: no tenderness  Respiratory: respiratory effort normal; clear to auscultation  Cardiovascular: S1S2 regular, no murmurs  Abdomen: soft, has epigastric tender, distended, positive BS  Genitourinary: no suprapubic tenderness  Lymphatic: no LN palpable  Musculoskeletal: no muscle tenderness, no joint swelling or tenderness  Extremities: no pedal edema  Neurological/ Psychiatric: AxOx3, judgement and insight normal; moving all extremities  Skin: no rashes; no palpable lesions    Labs: reviewed                        12.2   4.80  )-----------( 72       ( 13 Dec 2021 08:27 )             35.7     12-13    136  |  106  |  14  ----------------------------<  93  4.1   |  25  |  0.64    Ca    7.5<L>      13 Dec 2021 08:27  Phos  3.4     12-13  Mg     1.9     12-13    TPro  5.5<L>  /  Alb  1.8<L>  /  TBili  2.5<H>  /  DBili  x   /  AST  49<H>  /  ALT  32  /  AlkPhos  117  12-13    Vancomycin Level, Trough: 9.4 ug/mL (12-13 @ 09:47)  Vancomycin Level, Trough: 10.3 ug/mL (12-12 @ 19:01)                          11.5   11.23 )-----------( 60       ( 11 Dec 2021 09:19 )             33.0     12-11    134<L>  |  104  |  20  ----------------------------<  87  4.3   |  25  |  0.76    Ca    7.8<L>      11 Dec 2021 09:19    TPro  5.4<L>  /  Alb  2.0<L>  /  TBili  5.3<H>  /  DBili  x   /  AST  45<H>  /  ALT  30  /  AlkPhos  105  12-11     LIVER FUNCTIONS - ( 11 Dec 2021 09:19 )  Alb: 2.0 g/dL / Pro: 5.4 gm/dL / ALK PHOS: 105 U/L / ALT: 30 U/L / AST: 45 U/L / GGT: x             Culture - Fungal, Body Fluid (collected 10 Dec 2021 15:36)  Source: .Body Fluid Peritoneal Fluid  Preliminary Report (13 Dec 2021 09:46):    Testing in progress    Culture - Body Fluid with Gram Stain (collected 10 Dec 2021 15:36)  Source: .Body Fluid Peritoneal Fluid  Gram Stain (11 Dec 2021 06:23):    polymorphonuclear leukocytes seen    No organisms seen    by cytocentrifuge  Preliminary Report (11 Dec 2021 17:19):    No growth    Culture - Blood (collected 10 Dec 2021 13:55)  Source: .Blood Blood-Peripheral  Gram Stain (11 Dec 2021 09:55):    Growth in aerobic and anaerobic bottles: Gram Positive Cocci in Pairs and    Chains  Final Report (13 Dec 2021 09:28):    Growth in aerobic and anaerobic bottles: Streptococcus    salivarius/vestibularis group  Organism: Streptococcus salivarius/vestibularis group  Streptococcus salivarius/vestibularis group (13 Dec 2021 09:30)  Organism: Streptococcus salivarius/vestibularis group (13 Dec 2021 09:30)      -  Ceftriaxone: S 0.016      -  Penicillin: S 0.047      Method Type: ETEST  Organism: Streptococcus salivarius/vestibularis group (13 Dec 2021 09:30)      -  Clindamycin: S      -  Erythromycin: S      -  Levofloxacin: S      -  Vancomycin: S      Method Type: KB    Culture - Blood (collected 10 Dec 2021 13:55)  Source: .Blood Blood-Peripheral  Gram Stain (11 Dec 2021 09:56):    Growth in anaerobic bottle: Gram positive cocci in pairs    Growth in aerobic bottle: Gram Positive Cocci in Pairs and Chains  Final Report (12 Dec 2021 09:13):    Growth in aerobic and anaerobic bottles: Streptococcus    salivarius/vestibularis group    "Susceptibilities not performed"  Organism: Blood Culture PCR (12 Dec 2021 09:13)  Organism: Blood Culture PCR (12 Dec 2021 09:13)      -  Streptococcus sp. (Not Grp A, B or S pneumoniae): Detec      Method Type: PCR    COVID-19 PCR: NotDetec (12-10-21 @ 13:55)    Radiology: all available radiological tests reviewed    < from: CT Chest w/ IV Cont (12.10.21 @ 15:08) >  Findings suggesting cirrhosis withportal hypertension and large amount   of ascites. Portal vein thrombosis is again noted.    Moderate left and mild right pleural effusions which have not   significantly changed. Atelectatic lung. No focal consolidation seen.  < end of copied text >    < from: TTE Echo Complete w/o Contrast w/ Doppler (12.13.21 @ 10:48) >   Mitral Valve   Mild fibrocalcific changes noted to the mitral valve leaflets with   preserved leaflet excursion.   Aortic Valve   The aortic valve is trileaflet with thin pliable leaflets.   Tricuspid Valve   The tricuspid valve leaflets are thin and pliable; valve motion is   normal.   Trace tricuspid valve regurgitation is present.   Pulmonic Valve   Pulmonic valve not well seen, appears grossly normal.  < end of copied text >      Advanced directives addressed: full resuscitation

## 2021-12-14 NOTE — DISCHARGE NOTE PROVIDER - NSDCMRMEDTOKEN_GEN_ALL_CORE_FT
Fluzone Quadrivalent [id number] intramuscular suspension: 0.5 milliliter(s) intramuscular once  ***Dose given in October 2021***  furosemide 40 mg oral tablet: 1 tab(s) orally once a day  Pfizer-BioNTech COVID-19 Vaccine PF 30 mcg/0.3 mL intramuscular suspension: 0.3 milliliter(s) intramuscular once  ***3rd dose given in October 2021***  spironolactone 25 mg oral tablet: 4 tab(s) orally once a day   Eliquis 5 mg oral tablet: 1 tab(s) orally 2 times a day. Starting(12/22).  Then you will start taking 1 pill 2 times a day.   Eliquis Starter Pack for Treatment of DVT and PE 5 mg oral tablet: 2 tab(s) orally 2 times a day. Starting tonight (12/14) you will take Eliquis 10mg two times a day for seven days. Then you will switch to eiliquis 5mg two times a day  Fluzone Quadrivalent [id number] intramuscular suspension: 0.5 milliliter(s) intramuscular once  ***Dose given in October 2021***  furosemide 40 mg oral tablet: 1 tab(s) orally once a day  Pfizer-BioNTech COVID-19 Vaccine PF 30 mcg/0.3 mL intramuscular suspension: 0.3 milliliter(s) intramuscular once  ***3rd dose given in October 2021***  spironolactone 25 mg oral tablet: 4 tab(s) orally once a day   Eliquis 5 mg oral tablet: 1 tab(s) orally 2 times a day. Starting(12/22).  Then you will start taking 1 pill 2 times a day.   Eliquis Starter Pack for Treatment of DVT and PE 5 mg oral tablet: 2 tab(s) orally 2 times a day. Starting tonight (12/14) you will take Eliquis 10mg two times a day for seven days. Then you will switch to eiliquis 5mg two times a day  Fluzone Quadrivalent [id number] intramuscular suspension: 0.5 milliliter(s) intramuscular once  ***Dose given in October 2021***  furosemide 40 mg oral tablet: 1 tab(s) orally 2 times a day   Pfizer-BioNTech COVID-19 Vaccine PF 30 mcg/0.3 mL intramuscular suspension: 0.3 milliliter(s) intramuscular once  ***3rd dose given in October 2021***  spironolactone 100 mg oral tablet: 1 tab(s) orally 2 times a day

## 2021-12-14 NOTE — DISCHARGE NOTE PROVIDER - DETAILS OF MALNUTRITION DIAGNOSIS/DIAGNOSES
This patient has been assessed with a concern for Malnutrition and was treated during this hospitalization for the following Nutrition diagnosis/diagnoses:     -  12/12/2021: Severe protein-calorie malnutrition

## 2021-12-14 NOTE — PROGRESS NOTE ADULT - NUTRITIONAL ASSESSMENT
This patient has been assessed with a concern for Malnutrition and has been determined to have a diagnosis/diagnoses of Severe protein-calorie malnutrition.    This patient is being managed with:   Diet DASH/TLC-  Sodium & Cholesterol Restricted  Entered: Dec 10 2021  3:37PM

## 2021-12-14 NOTE — DISCHARGE NOTE PROVIDER - NSDCCAREPROVSEEN_GEN_ALL_CORE_FT
Derrick, Aung Gomez, Rasta Garcia, Dony Rivas, Claudio Lau, Abraham Mariano, Vinny Gaston, Daniella Sanon, Riley Pope, Claudio Maya, Alverto Page, Marshall Nagel, William

## 2021-12-14 NOTE — DISCHARGE NOTE NURSING/CASE MANAGEMENT/SOCIAL WORK - PATIENT PORTAL LINK FT
You can access the FollowMyHealth Patient Portal offered by [facility] by registering at the following website: [email or web address]. By joining [employer]’s FollowMyHealth portal, you will also be able to view your health information using other applications (apps) compatible with our system.

## 2021-12-14 NOTE — ADVANCED PRACTICE NURSE CONSULT - ASSESSMENT
Pt awake and alert. Risks and benefits of midline catheter explained, verbal consent obtained. ARROW endurance extended dwell midline catheter 8cm, 18g, lot # [id number] placed in left cephalic vein under ultrasound guidance and sterile precautions. Brisk blood return, flushes well with 10ml normal saline, Okay to use.

## 2021-12-14 NOTE — PROGRESS NOTE ADULT - REASON FOR ADMISSION
Abdominal pain/distention

## 2021-12-14 NOTE — PROGRESS NOTE ADULT - ASSESSMENT
56M EtOH cirrhosis, ascites, chronic/subacute portal vein thrombosis. Admitted with tense ascites and sepsis, likely due to SBP. S/p large volume paracentesis (no culture or cell count sent).  Improved with LVP x 2 and abx.    Rec:  -cont abx per ID  -cont lasix 40 bid and spirono 100 bid at home and I can uptitrate prn  -2g Na diet discussed again  -no current signs of hepatic encephalopathy  -No significant varices on EGD yest  -agree with anticoag for PVT, start tomorrow  -avoid non selective beta blockers and will likely need SBP ppx after systemic abx finished  -EtOH cessation (last drink 10/31/2021).  -d/w pt in detail, will follow in 1 week as outpt

## 2021-12-14 NOTE — DISCHARGE NOTE PROVIDER - CARE PROVIDER_API CALL
[place] [address]  Phone: [phone]  Fax: [phone]  Established Patient  Follow Up Time:     Alverto Maya)  Gastroenterology; Internal Medicine  [address], [address]  [place] [address]  Phone: [phone]  Fax: [phone]  Established Patient  Follow Up Time:

## 2021-12-14 NOTE — CONSULT NOTE ADULT - ASSESSMENT
55yo M with ascites referred to IR for paracentesis  - Repeat covid PCR is negative  - Plan for IR paracentesis today prior to discharge

## 2021-12-15 LAB
CULTURE RESULTS: SIGNIFICANT CHANGE UP
SPECIMEN SOURCE: SIGNIFICANT CHANGE UP

## 2021-12-15 PROCEDURE — 49083 ABD PARACENTESIS W/IMAGING: CPT

## 2021-12-18 LAB
CULTURE RESULTS: SIGNIFICANT CHANGE UP
CULTURE RESULTS: SIGNIFICANT CHANGE UP
SPECIMEN SOURCE: SIGNIFICANT CHANGE UP
SPECIMEN SOURCE: SIGNIFICANT CHANGE UP

## 2021-12-23 DIAGNOSIS — K29.70 GASTRITIS, UNSPECIFIED, WITHOUT BLEEDING: ICD-10-CM

## 2021-12-23 DIAGNOSIS — K70.31 ALCOHOLIC CIRRHOSIS OF LIVER WITH ASCITES: ICD-10-CM

## 2021-12-23 DIAGNOSIS — E43 UNSPECIFIED SEVERE PROTEIN-CALORIE MALNUTRITION: ICD-10-CM

## 2021-12-23 DIAGNOSIS — I07.1 RHEUMATIC TRICUSPID INSUFFICIENCY: ICD-10-CM

## 2021-12-23 DIAGNOSIS — K65.2 SPONTANEOUS BACTERIAL PERITONITIS: ICD-10-CM

## 2021-12-23 DIAGNOSIS — A40.9 STREPTOCOCCAL SEPSIS, UNSPECIFIED: ICD-10-CM

## 2021-12-23 DIAGNOSIS — D69.6 THROMBOCYTOPENIA, UNSPECIFIED: ICD-10-CM

## 2021-12-23 DIAGNOSIS — I81 PORTAL VEIN THROMBOSIS: ICD-10-CM

## 2021-12-23 DIAGNOSIS — I85.10 SECONDARY ESOPHAGEAL VARICES WITHOUT BLEEDING: ICD-10-CM

## 2021-12-23 DIAGNOSIS — K31.89 OTHER DISEASES OF STOMACH AND DUODENUM: ICD-10-CM

## 2021-12-23 DIAGNOSIS — I38 ENDOCARDITIS, VALVE UNSPECIFIED: ICD-10-CM

## 2021-12-23 NOTE — CHART NOTE - NSCHARTNOTEFT_GEN_A_CORE
Patient is able to be discharged from hospital to his home with uber transportation.
Pt seen and examined with house staff.  Plan formulated and reviewed on rounds     Briefly,  57 y/o male ETOH user and ETOH cirrhosis presents with large ascites S/P IR drainage.  He is noted to have + BCx in all bottles growing Strep.  Ascitic fluid cell count not sent.  Started on IV Vanco and Unasyn.    + Portal Vein thrombosis noted on CT scan along with B/L pl effusions  Thrombocytopenia.  INR elevated and Albumin around 2  ROS o/w negative     Awake and alert  NAD  Stable vitals  No fevers    Grossly non focal   Abd soft    Labs reviewed    Strep sepsis likely abd source  Acute decompensated cirrhosis     High risk for acute decompensation and deterioration     IV Vanco/Unasyn  Follow up Cxs  Appreciate Dr Gomez input  Fluid restriction    Med Surg
clarification of DX    SBP = spontaneous bacterial peritonitis
Pt seen and examined with house staff.  Plan formulated and reviewed on rounds     Briefly,  55 y/o male ETOH user and ETOH cirrhosis presents with large ascites S/P IR drainage.  He is noted to have + BCx in all bottles growing Strep.  Ascitic fluid cell count not sent--Cx negative.  Started on IV Vanco and Unasyn  + Portal Vein thrombosis noted on CT scan along with B/L pl effusions  Thrombocytopenia.  INR elevated and Albumin around 2  ROS o/w negative   Case d/w Dr Maya     Awake and alert  NAD  Stable vitals  No fevers    Grossly non focal   Abd soft    Labs reviewed    Strep sepsis likely abd source  Acute decompensated cirrhosis     High risk for acute decompensation and deterioration     IV Vanco/Unasyn  For EGD tomorrow followed by AC   Furosemide and Aldactone  Fluid restriction    Med Surg

## 2021-12-25 LAB
CULTURE RESULTS: SIGNIFICANT CHANGE UP
SPECIMEN SOURCE: SIGNIFICANT CHANGE UP

## 2022-01-08 LAB
CULTURE RESULTS: SIGNIFICANT CHANGE UP
SPECIMEN SOURCE: SIGNIFICANT CHANGE UP

## 2022-02-15 PROBLEM — D69.59 OTHER SECONDARY THROMBOCYTOPENIA: Chronic | Status: ACTIVE | Noted: 2021-12-10

## 2022-02-15 PROBLEM — F10.10 ALCOHOL ABUSE, UNCOMPLICATED: Chronic | Status: ACTIVE | Noted: 2021-12-10

## 2022-02-15 PROBLEM — K74.60 UNSPECIFIED CIRRHOSIS OF LIVER: Chronic | Status: ACTIVE | Noted: 2021-12-10

## 2022-02-15 PROBLEM — K70.0 ALCOHOLIC FATTY LIVER: Chronic | Status: ACTIVE | Noted: 2021-12-10

## 2022-02-16 PROBLEM — Z00.00 ENCOUNTER FOR PREVENTIVE HEALTH EXAMINATION: Status: ACTIVE | Noted: 2022-02-16

## 2022-02-17 ENCOUNTER — APPOINTMENT (OUTPATIENT)
Dept: HEPATOLOGY | Facility: CLINIC | Age: 57
End: 2022-02-17
Payer: COMMERCIAL

## 2022-02-17 VITALS
HEART RATE: 72 BPM | TEMPERATURE: 97.3 F | HEIGHT: 75 IN | DIASTOLIC BLOOD PRESSURE: 70 MMHG | RESPIRATION RATE: 15 BRPM | WEIGHT: 210 LBS | BODY MASS INDEX: 26.11 KG/M2 | OXYGEN SATURATION: 100 % | SYSTOLIC BLOOD PRESSURE: 119 MMHG

## 2022-02-17 PROCEDURE — 99204 OFFICE O/P NEW MOD 45 MIN: CPT

## 2022-02-17 RX ORDER — ERGOCALCIFEROL 1.25 MG/1
1.25 MG CAPSULE, LIQUID FILLED ORAL
Refills: 0 | Status: ACTIVE | COMMUNITY

## 2022-02-21 LAB
ALBUMIN SERPL ELPH-MCNC: 3.4 G/DL
ALP BLD-CCNC: 122 U/L
ALT SERPL-CCNC: 52 U/L
ANION GAP SERPL CALC-SCNC: 9 MMOL/L
AST SERPL-CCNC: 85 U/L
BASOPHILS # BLD AUTO: 0.06 K/UL
BASOPHILS NFR BLD AUTO: 1.1 %
BILIRUB SERPL-MCNC: 2.4 MG/DL
BUN SERPL-MCNC: 13 MG/DL
CALCIUM SERPL-MCNC: 9.1 MG/DL
CHLORIDE SERPL-SCNC: 97 MMOL/L
CO2 SERPL-SCNC: 25 MMOL/L
CREAT SERPL-MCNC: 0.78 MG/DL
EOSINOPHIL # BLD AUTO: 0.13 K/UL
EOSINOPHIL NFR BLD AUTO: 2.5 %
GLUCOSE SERPL-MCNC: 94 MG/DL
HBV CORE IGG+IGM SER QL: NONREACTIVE
HBV SURFACE AB SER QL: NONREACTIVE
HBV SURFACE AG SER QL: NONREACTIVE
HCT VFR BLD CALC: 40 %
HCV AB SER QL: NONREACTIVE
HCV S/CO RATIO: 0.25 S/CO
HEPATITIS A IGG ANTIBODY: REACTIVE
HGB BLD-MCNC: 13.7 G/DL
IMM GRANULOCYTES NFR BLD AUTO: 0.4 %
INR PPP: 1.41 RATIO
LYMPHOCYTES # BLD AUTO: 1.21 K/UL
LYMPHOCYTES NFR BLD AUTO: 22.9 %
MAN DIFF?: NORMAL
MCHC RBC-ENTMCNC: 33.3 PG
MCHC RBC-ENTMCNC: 34.3 GM/DL
MCV RBC AUTO: 97.1 FL
MONOCYTES # BLD AUTO: 0.98 K/UL
MONOCYTES NFR BLD AUTO: 18.6 %
NEUTROPHILS # BLD AUTO: 2.88 K/UL
NEUTROPHILS NFR BLD AUTO: 54.5 %
PLATELET # BLD AUTO: 70 K/UL
POTASSIUM SERPL-SCNC: 5 MMOL/L
PROT SERPL-MCNC: 6.4 G/DL
PT BLD: 16.4 SEC
RBC # BLD: 4.12 M/UL
RBC # FLD: 14.9 %
SODIUM SERPL-SCNC: 131 MMOL/L
WBC # FLD AUTO: 5.28 K/UL

## 2022-02-25 ENCOUNTER — TRANSCRIPTION ENCOUNTER (OUTPATIENT)
Age: 57
End: 2022-02-25

## 2022-03-17 ENCOUNTER — APPOINTMENT (OUTPATIENT)
Dept: HEPATOLOGY | Facility: CLINIC | Age: 57
End: 2022-03-17
Payer: COMMERCIAL

## 2022-03-17 VITALS
HEART RATE: 75 BPM | HEIGHT: 74 IN | BODY MASS INDEX: 26.95 KG/M2 | WEIGHT: 210 LBS | RESPIRATION RATE: 15 BRPM | TEMPERATURE: 97.2 F | OXYGEN SATURATION: 100 % | DIASTOLIC BLOOD PRESSURE: 74 MMHG | SYSTOLIC BLOOD PRESSURE: 127 MMHG

## 2022-03-17 PROCEDURE — 99214 OFFICE O/P EST MOD 30 MIN: CPT

## 2022-03-17 RX ORDER — SPIRONOLACTONE 100 MG/1
100 TABLET ORAL
Refills: 0 | Status: DISCONTINUED | COMMUNITY
End: 2022-03-17

## 2022-03-17 RX ORDER — FUROSEMIDE 40 MG/4ML
40 SOLUTION ORAL
Refills: 0 | Status: DISCONTINUED | COMMUNITY
End: 2022-03-17

## 2022-03-20 LAB
ALBUMIN SERPL ELPH-MCNC: 3.6 G/DL
ALP BLD-CCNC: 97 U/L
ALT SERPL-CCNC: 53 U/L
ANION GAP SERPL CALC-SCNC: 10 MMOL/L
AST SERPL-CCNC: 85 U/L
BASOPHILS # BLD AUTO: 0.05 K/UL
BASOPHILS NFR BLD AUTO: 0.9 %
BILIRUB SERPL-MCNC: 2.8 MG/DL
BUN SERPL-MCNC: 17 MG/DL
CALCIUM SERPL-MCNC: 9.3 MG/DL
CHLORIDE SERPL-SCNC: 96 MMOL/L
CO2 SERPL-SCNC: 24 MMOL/L
CREAT SERPL-MCNC: 0.86 MG/DL
EGFR: 100 ML/MIN/1.73M2
EOSINOPHIL # BLD AUTO: 0.16 K/UL
EOSINOPHIL NFR BLD AUTO: 2.8 %
GLUCOSE SERPL-MCNC: 79 MG/DL
HCT VFR BLD CALC: 39.5 %
HGB BLD-MCNC: 13.5 G/DL
IMM GRANULOCYTES NFR BLD AUTO: 0.2 %
INR PPP: 1.58 RATIO
LYMPHOCYTES # BLD AUTO: 1.3 K/UL
LYMPHOCYTES NFR BLD AUTO: 22.5 %
MAN DIFF?: NORMAL
MCHC RBC-ENTMCNC: 33.4 PG
MCHC RBC-ENTMCNC: 34.2 GM/DL
MCV RBC AUTO: 97.8 FL
MONOCYTES # BLD AUTO: 1 K/UL
MONOCYTES NFR BLD AUTO: 17.3 %
NEUTROPHILS # BLD AUTO: 3.26 K/UL
NEUTROPHILS NFR BLD AUTO: 56.3 %
PLATELET # BLD AUTO: 65 K/UL
POTASSIUM SERPL-SCNC: 5.4 MMOL/L
PROT SERPL-MCNC: 6.5 G/DL
PT BLD: 18.4 SEC
RBC # BLD: 4.04 M/UL
RBC # FLD: 15.1 %
SODIUM SERPL-SCNC: 130 MMOL/L
WBC # FLD AUTO: 5.78 K/UL

## 2022-03-28 ENCOUNTER — OUTPATIENT (OUTPATIENT)
Dept: OUTPATIENT SERVICES | Facility: HOSPITAL | Age: 57
LOS: 1 days | Discharge: HOME | End: 2022-03-28

## 2022-03-28 DIAGNOSIS — Z98.890 OTHER SPECIFIED POSTPROCEDURAL STATES: Chronic | ICD-10-CM

## 2022-03-31 ENCOUNTER — OUTPATIENT (OUTPATIENT)
Dept: OUTPATIENT SERVICES | Facility: HOSPITAL | Age: 57
LOS: 1 days | Discharge: HOME | End: 2022-03-31

## 2022-03-31 VITALS
TEMPERATURE: 98 F | SYSTOLIC BLOOD PRESSURE: 117 MMHG | HEART RATE: 65 BPM | HEIGHT: 74 IN | OXYGEN SATURATION: 100 % | DIASTOLIC BLOOD PRESSURE: 73 MMHG | RESPIRATION RATE: 14 BRPM | WEIGHT: 210.1 LBS

## 2022-03-31 DIAGNOSIS — Z98.890 OTHER SPECIFIED POSTPROCEDURAL STATES: Chronic | ICD-10-CM

## 2022-03-31 DIAGNOSIS — Z12.11 ENCOUNTER FOR SCREENING FOR MALIGNANT NEOPLASM OF COLON: ICD-10-CM

## 2022-03-31 NOTE — ASU PATIENT PROFILE, ADULT - FALL HARM RISK - UNIVERSAL INTERVENTIONS
Bed in lowest position, wheels locked, appropriate side rails in place/Call bell, personal items and telephone in reach/Instruct patient to call for assistance before getting out of bed or chair/Non-slip footwear when patient is out of bed/[place] to call system/Physically safe environment - no spills, clutter or unnecessary equipment/Purposeful Proactive Rounding/Room/bathroom lighting operational, light cord in reach

## 2022-04-01 DIAGNOSIS — F10.20 ALCOHOL DEPENDENCE, UNCOMPLICATED: ICD-10-CM

## 2022-04-06 DIAGNOSIS — K70.0 ALCOHOLIC FATTY LIVER: ICD-10-CM

## 2022-04-06 DIAGNOSIS — F10.10 ALCOHOL ABUSE, UNCOMPLICATED: ICD-10-CM

## 2022-04-06 DIAGNOSIS — D69.59 OTHER SECONDARY THROMBOCYTOPENIA: ICD-10-CM

## 2022-04-06 DIAGNOSIS — Z12.11 ENCOUNTER FOR SCREENING FOR MALIGNANT NEOPLASM OF COLON: ICD-10-CM

## 2022-04-06 DIAGNOSIS — K74.60 UNSPECIFIED CIRRHOSIS OF LIVER: ICD-10-CM

## 2022-04-24 ENCOUNTER — NON-APPOINTMENT (OUTPATIENT)
Age: 57
End: 2022-04-24

## 2022-04-28 RX ORDER — RNA INGREDIENT BNT-162B2 0.23 G/1.8ML
0.3 INJECTION, SUSPENSION INTRAMUSCULAR
Qty: 0 | Refills: 0 | DISCHARGE

## 2022-04-28 RX ORDER — INFLUENZA VIRUS VACCINE 15; 15; 15; 15 UG/.5ML; UG/.5ML; UG/.5ML; UG/.5ML
0.5 SUSPENSION INTRAMUSCULAR
Qty: 0 | Refills: 0 | DISCHARGE

## 2022-04-28 NOTE — ASU PATIENT PROFILE, ADULT - ALCOHOL USE HISTORY SINGLE SELECT
60 yo M underwent RALP on 7/17/2020.  Postoperative course uneventful, pain controlled, urine remained acceptable in color, ambulating.  Tolerated diet, diet advanced without incident. Pt d/c with chen to leg bag to f/u with Dr. Valiente.    SUJITstop
never

## 2022-04-28 NOTE — ASU PATIENT PROFILE, ADULT - FALL HARM RISK - UNIVERSAL INTERVENTIONS
Bed in lowest position, wheels locked, appropriate side rails in place/Call bell, personal items and telephone in reach/Instruct patient to call for assistance before getting out of bed or chair/Non-slip footwear when patient is out of bed/[place] to call system/Physically safe environment - no spills, clutter or unnecessary equipment/Purposeful Proactive Rounding/Room/bathroom lighting operational, light cord in reach

## 2022-04-28 NOTE — ASU PATIENT PROFILE, ADULT - NSICDXPASTSURGICALHX_GEN_ALL_CORE_FT
PAST SURGICAL HISTORY:  S/P abdominal paracentesis      PAST SURGICAL HISTORY:  History of tonsillectomy and adenoidectomy as child    S/P abdominal paracentesis

## 2022-04-29 ENCOUNTER — OUTPATIENT (OUTPATIENT)
Dept: INPATIENT UNIT | Facility: HOSPITAL | Age: 57
LOS: 1 days | Discharge: HOME | End: 2022-04-29
Payer: COMMERCIAL

## 2022-04-29 ENCOUNTER — RESULT REVIEW (OUTPATIENT)
Age: 57
End: 2022-04-29

## 2022-04-29 ENCOUNTER — TRANSCRIPTION ENCOUNTER (OUTPATIENT)
Age: 57
End: 2022-04-29

## 2022-04-29 VITALS
SYSTOLIC BLOOD PRESSURE: 105 MMHG | RESPIRATION RATE: 16 BRPM | TEMPERATURE: 98 F | DIASTOLIC BLOOD PRESSURE: 70 MMHG | OXYGEN SATURATION: 99 % | WEIGHT: 242.07 LBS | HEIGHT: 74 IN | HEART RATE: 73 BPM

## 2022-04-29 VITALS
HEART RATE: 66 BPM | DIASTOLIC BLOOD PRESSURE: 76 MMHG | SYSTOLIC BLOOD PRESSURE: 118 MMHG | OXYGEN SATURATION: 100 % | RESPIRATION RATE: 16 BRPM | TEMPERATURE: 97 F

## 2022-04-29 DIAGNOSIS — Z90.89 ACQUIRED ABSENCE OF OTHER ORGANS: Chronic | ICD-10-CM

## 2022-04-29 DIAGNOSIS — K70.31 ALCOHOLIC CIRRHOSIS OF LIVER WITH ASCITES: ICD-10-CM

## 2022-04-29 DIAGNOSIS — R18.8 OTHER ASCITES: ICD-10-CM

## 2022-04-29 DIAGNOSIS — Z98.890 OTHER SPECIFIED POSTPROCEDURAL STATES: Chronic | ICD-10-CM

## 2022-04-29 LAB
ANION GAP SERPL CALC-SCNC: 5 MMOL/L — SIGNIFICANT CHANGE UP (ref 5–17)
BUN SERPL-MCNC: 11 MG/DL — SIGNIFICANT CHANGE UP (ref 7–23)
CALCIUM SERPL-MCNC: 8.3 MG/DL — LOW (ref 8.5–10.1)
CHLORIDE SERPL-SCNC: 108 MMOL/L — SIGNIFICANT CHANGE UP (ref 96–108)
CO2 SERPL-SCNC: 28 MMOL/L — SIGNIFICANT CHANGE UP (ref 22–31)
CREAT SERPL-MCNC: 0.77 MG/DL — SIGNIFICANT CHANGE UP (ref 0.5–1.3)
EGFR: 104 ML/MIN/1.73M2 — SIGNIFICANT CHANGE UP
GLUCOSE SERPL-MCNC: 91 MG/DL — SIGNIFICANT CHANGE UP (ref 70–99)
HCT VFR BLD CALC: 37 % — LOW (ref 39–50)
HGB BLD-MCNC: 12.4 G/DL — LOW (ref 13–17)
INR BLD: 1.49 RATIO — HIGH (ref 0.88–1.16)
MCHC RBC-ENTMCNC: 33.2 PG — SIGNIFICANT CHANGE UP (ref 27–34)
MCHC RBC-ENTMCNC: 33.5 GM/DL — SIGNIFICANT CHANGE UP (ref 32–36)
MCV RBC AUTO: 99.2 FL — SIGNIFICANT CHANGE UP (ref 80–100)
PLATELET # BLD AUTO: 60 K/UL — LOW (ref 150–400)
POTASSIUM SERPL-MCNC: 4 MMOL/L — SIGNIFICANT CHANGE UP (ref 3.5–5.3)
POTASSIUM SERPL-SCNC: 4 MMOL/L — SIGNIFICANT CHANGE UP (ref 3.5–5.3)
PROTHROM AB SERPL-ACNC: 17.3 SEC — HIGH (ref 10.5–13.4)
RBC # BLD: 3.73 M/UL — LOW (ref 4.2–5.8)
RBC # FLD: 14.4 % — SIGNIFICANT CHANGE UP (ref 10.3–14.5)
SODIUM SERPL-SCNC: 141 MMOL/L — SIGNIFICANT CHANGE UP (ref 135–145)
WBC # BLD: 3.96 K/UL — SIGNIFICANT CHANGE UP (ref 3.8–10.5)
WBC # FLD AUTO: 3.96 K/UL — SIGNIFICANT CHANGE UP (ref 3.8–10.5)

## 2022-04-29 PROCEDURE — 85610 PROTHROMBIN TIME: CPT

## 2022-04-29 PROCEDURE — 85027 COMPLETE CBC AUTOMATED: CPT

## 2022-04-29 PROCEDURE — 80048 BASIC METABOLIC PNL TOTAL CA: CPT

## 2022-04-29 PROCEDURE — 49083 ABD PARACENTESIS W/IMAGING: CPT

## 2022-04-29 PROCEDURE — 36415 COLL VENOUS BLD VENIPUNCTURE: CPT

## 2022-04-29 PROCEDURE — C1729: CPT

## 2022-04-29 NOTE — ASU DISCHARGE PLAN (ADULT/PEDIATRIC) - NS MD DC FALL RISK RISK
For information on Fall & Injury Prevention, visit: [email or web address] OR  [email or web address] OR  https://www.cdc.gov/steadi/patient.html

## 2022-04-29 NOTE — ASU PREOP CHECKLIST - PATIENT'S PERSONAL PROPERTY REMOVED
[FreeTextEntry1] : 21 yo  F  with dyspepsia with symptoms controlled with standard dose PPI, H pylori gastritis SP treatment no testing of eradication performed.\par \par 1)Hpylori gatsritis\par \par rec:\par - Hold PPI x 2 weeks then test for stool antigen\par - After HP tesing resume PPI QD.\par - Diet modifications\par - Will attempt weaning of PPI in 8 weeks\par \par 
glasses/jewelry

## 2022-05-07 ENCOUNTER — INPATIENT (INPATIENT)
Facility: HOSPITAL | Age: 57
LOS: 4 days | Discharge: HOME | DRG: 371 | End: 2022-05-12
Attending: INTERNAL MEDICINE | Admitting: INTERNAL MEDICINE
Payer: COMMERCIAL

## 2022-05-07 VITALS
RESPIRATION RATE: 19 BRPM | HEART RATE: 96 BPM | OXYGEN SATURATION: 94 % | DIASTOLIC BLOOD PRESSURE: 70 MMHG | TEMPERATURE: 99 F | SYSTOLIC BLOOD PRESSURE: 119 MMHG

## 2022-05-07 DIAGNOSIS — Z90.89 ACQUIRED ABSENCE OF OTHER ORGANS: Chronic | ICD-10-CM

## 2022-05-07 DIAGNOSIS — R18.8 OTHER ASCITES: ICD-10-CM

## 2022-05-07 DIAGNOSIS — Z98.890 OTHER SPECIFIED POSTPROCEDURAL STATES: Chronic | ICD-10-CM

## 2022-05-07 LAB
ALBUMIN SERPL ELPH-MCNC: 2.7 G/DL — LOW (ref 3.3–5)
ALP SERPL-CCNC: 108 U/L — SIGNIFICANT CHANGE UP (ref 40–120)
ALT FLD-CCNC: 41 U/L — SIGNIFICANT CHANGE UP (ref 12–78)
AMMONIA BLD-MCNC: 11 UMOL/L — SIGNIFICANT CHANGE UP (ref 11–32)
ANION GAP SERPL CALC-SCNC: 7 MMOL/L — SIGNIFICANT CHANGE UP (ref 5–17)
AST SERPL-CCNC: 69 U/L — HIGH (ref 15–37)
BASOPHILS # BLD AUTO: 0.04 K/UL — SIGNIFICANT CHANGE UP (ref 0–0.2)
BASOPHILS NFR BLD AUTO: 0.4 % — SIGNIFICANT CHANGE UP (ref 0–2)
BILIRUB SERPL-MCNC: 4.8 MG/DL — HIGH (ref 0.2–1.2)
BUN SERPL-MCNC: 18 MG/DL — SIGNIFICANT CHANGE UP (ref 7–23)
CALCIUM SERPL-MCNC: 8.5 MG/DL — SIGNIFICANT CHANGE UP (ref 8.5–10.1)
CHLORIDE SERPL-SCNC: 101 MMOL/L — SIGNIFICANT CHANGE UP (ref 96–108)
CO2 SERPL-SCNC: 26 MMOL/L — SIGNIFICANT CHANGE UP (ref 22–31)
CREAT SERPL-MCNC: 1.01 MG/DL — SIGNIFICANT CHANGE UP (ref 0.5–1.3)
EGFR: 86 ML/MIN/1.73M2 — SIGNIFICANT CHANGE UP
EOSINOPHIL # BLD AUTO: 0.07 K/UL — SIGNIFICANT CHANGE UP (ref 0–0.5)
EOSINOPHIL NFR BLD AUTO: 0.8 % — SIGNIFICANT CHANGE UP (ref 0–6)
FLUAV AG NPH QL: SIGNIFICANT CHANGE UP
FLUBV AG NPH QL: SIGNIFICANT CHANGE UP
GLUCOSE SERPL-MCNC: 102 MG/DL — HIGH (ref 70–99)
HCT VFR BLD CALC: 39.7 % — SIGNIFICANT CHANGE UP (ref 39–50)
HGB BLD-MCNC: 13.6 G/DL — SIGNIFICANT CHANGE UP (ref 13–17)
IMM GRANULOCYTES NFR BLD AUTO: 0.3 % — SIGNIFICANT CHANGE UP (ref 0–1.5)
LACTATE SERPL-SCNC: 2.2 MMOL/L — HIGH (ref 0.7–2)
LIDOCAIN IGE QN: 216 U/L — SIGNIFICANT CHANGE UP (ref 73–393)
LYMPHOCYTES # BLD AUTO: 0.79 K/UL — LOW (ref 1–3.3)
LYMPHOCYTES # BLD AUTO: 8.7 % — LOW (ref 13–44)
MAGNESIUM SERPL-MCNC: 2.1 MG/DL — SIGNIFICANT CHANGE UP (ref 1.6–2.6)
MCHC RBC-ENTMCNC: 33.8 PG — SIGNIFICANT CHANGE UP (ref 27–34)
MCHC RBC-ENTMCNC: 34.3 GM/DL — SIGNIFICANT CHANGE UP (ref 32–36)
MCV RBC AUTO: 98.8 FL — SIGNIFICANT CHANGE UP (ref 80–100)
MONOCYTES # BLD AUTO: 0.74 K/UL — SIGNIFICANT CHANGE UP (ref 0–0.9)
MONOCYTES NFR BLD AUTO: 8.2 % — SIGNIFICANT CHANGE UP (ref 2–14)
NEUTROPHILS # BLD AUTO: 7.4 K/UL — SIGNIFICANT CHANGE UP (ref 1.8–7.4)
NEUTROPHILS NFR BLD AUTO: 81.6 % — HIGH (ref 43–77)
PLATELET # BLD AUTO: 65 K/UL — LOW (ref 150–400)
POTASSIUM SERPL-MCNC: 4.3 MMOL/L — SIGNIFICANT CHANGE UP (ref 3.5–5.3)
POTASSIUM SERPL-SCNC: 4.3 MMOL/L — SIGNIFICANT CHANGE UP (ref 3.5–5.3)
PROT SERPL-MCNC: 6.1 GM/DL — SIGNIFICANT CHANGE UP (ref 6–8.3)
RBC # BLD: 4.02 M/UL — LOW (ref 4.2–5.8)
RBC # FLD: 14 % — SIGNIFICANT CHANGE UP (ref 10.3–14.5)
RSV RNA NPH QL NAA+NON-PROBE: SIGNIFICANT CHANGE UP
SARS-COV-2 RNA SPEC QL NAA+PROBE: SIGNIFICANT CHANGE UP
SODIUM SERPL-SCNC: 134 MMOL/L — LOW (ref 135–145)
WBC # BLD: 9.07 K/UL — SIGNIFICANT CHANGE UP (ref 3.8–10.5)
WBC # FLD AUTO: 9.07 K/UL — SIGNIFICANT CHANGE UP (ref 3.8–10.5)

## 2022-05-07 PROCEDURE — 87206 SMEAR FLUORESCENT/ACID STAI: CPT

## 2022-05-07 PROCEDURE — 82042 OTHER SOURCE ALBUMIN QUAN EA: CPT

## 2022-05-07 PROCEDURE — 80053 COMPREHEN METABOLIC PANEL: CPT

## 2022-05-07 PROCEDURE — 99223 1ST HOSP IP/OBS HIGH 75: CPT

## 2022-05-07 PROCEDURE — 71045 X-RAY EXAM CHEST 1 VIEW: CPT | Mod: 26

## 2022-05-07 PROCEDURE — 87116 MYCOBACTERIA CULTURE: CPT

## 2022-05-07 PROCEDURE — 87015 SPECIMEN INFECT AGNT CONCNTJ: CPT

## 2022-05-07 PROCEDURE — 99285 EMERGENCY DEPT VISIT HI MDM: CPT

## 2022-05-07 PROCEDURE — 93010 ELECTROCARDIOGRAM REPORT: CPT

## 2022-05-07 PROCEDURE — 80048 BASIC METABOLIC PNL TOTAL CA: CPT

## 2022-05-07 PROCEDURE — 87635 SARS-COV-2 COVID-19 AMP PRB: CPT

## 2022-05-07 PROCEDURE — 82945 GLUCOSE OTHER FLUID: CPT

## 2022-05-07 PROCEDURE — 84157 ASSAY OF PROTEIN OTHER: CPT

## 2022-05-07 PROCEDURE — 89051 BODY FLUID CELL COUNT: CPT

## 2022-05-07 PROCEDURE — 82248 BILIRUBIN DIRECT: CPT

## 2022-05-07 PROCEDURE — 88108 CYTOPATH CONCENTRATE TECH: CPT

## 2022-05-07 PROCEDURE — 85025 COMPLETE CBC W/AUTO DIFF WBC: CPT

## 2022-05-07 PROCEDURE — 87075 CULTR BACTERIA EXCEPT BLOOD: CPT

## 2022-05-07 PROCEDURE — 81001 URINALYSIS AUTO W/SCOPE: CPT

## 2022-05-07 PROCEDURE — C1729: CPT

## 2022-05-07 PROCEDURE — 87070 CULTURE OTHR SPECIMN AEROBIC: CPT

## 2022-05-07 PROCEDURE — 83605 ASSAY OF LACTIC ACID: CPT

## 2022-05-07 PROCEDURE — 83615 LACTATE (LD) (LDH) ENZYME: CPT

## 2022-05-07 PROCEDURE — 85027 COMPLETE CBC AUTOMATED: CPT

## 2022-05-07 PROCEDURE — 49083 ABD PARACENTESIS W/IMAGING: CPT

## 2022-05-07 PROCEDURE — 36415 COLL VENOUS BLD VENIPUNCTURE: CPT

## 2022-05-07 PROCEDURE — 87102 FUNGUS ISOLATION CULTURE: CPT

## 2022-05-07 RX ORDER — CEFTRIAXONE 500 MG/1
2000 INJECTION, POWDER, FOR SOLUTION INTRAMUSCULAR; INTRAVENOUS ONCE
Refills: 0 | Status: COMPLETED | OUTPATIENT
Start: 2022-05-07 | End: 2022-05-07

## 2022-05-07 RX ORDER — ACETAMINOPHEN 500 MG
650 TABLET ORAL EVERY 6 HOURS
Refills: 0 | Status: DISCONTINUED | OUTPATIENT
Start: 2022-05-07 | End: 2022-05-07

## 2022-05-07 RX ORDER — ERGOCALCIFEROL 1.25 MG/1
1 CAPSULE ORAL
Qty: 0 | Refills: 0 | DISCHARGE

## 2022-05-07 RX ORDER — IBUPROFEN 200 MG
400 TABLET ORAL ONCE
Refills: 0 | Status: COMPLETED | OUTPATIENT
Start: 2022-05-07 | End: 2022-05-07

## 2022-05-07 RX ADMIN — CEFTRIAXONE 100 MILLIGRAM(S): 500 INJECTION, POWDER, FOR SOLUTION INTRAMUSCULAR; INTRAVENOUS at 22:48

## 2022-05-07 RX ADMIN — Medication 400 MILLIGRAM(S): at 23:39

## 2022-05-07 NOTE — ED ADULT NURSE NOTE - NSIMPLEMENTINTERV_GEN_ALL_ED
Implemented All Universal Safety Interventions:  [place] to call system. Call bell, personal items and telephone within reach. Instruct patient to call for assistance. Room bathroom lighting operational. Non-slip footwear when patient is off stretcher. Physically safe environment: no spills, clutter or unnecessary equipment. Stretcher in lowest position, wheels locked, appropriate side rails in place.

## 2022-05-07 NOTE — ED ADULT TRIAGE NOTE - CHIEF COMPLAINT QUOTE
sent in by dr. lr for fever of 102 at home. given tylenol 1000mg at 1930. ascites and abdominal distension worsening since monday, patient had paracentesis on friday. recent furosemide dose increased from 40mg to 80mg twice daily, reports decreased urine output for past couple days.

## 2022-05-07 NOTE — ED PROVIDER NOTE - CLINICAL SUMMARY MEDICAL DECISION MAKING FREE TEXT BOX
57 y/o male with PMHx of cirrhosis presents to the ED febrile at home. Pt afebrile here and has no complaints other than mild SOB. Pt sent in by Dr. Pro. Will admit for further work-up and treatment, likely will need diagnostic paracentesis tomorrow. Low suspicion for SBP.

## 2022-05-07 NOTE — ED STATDOCS - PROGRESS NOTE DETAILS
Erwin Moore for attending Dr. Maza:  57 y/o male with a PMHx of alcoholic cirrhosis of liver, 10lb weight gain over the past week, fever today which has resolved with Tylenol c/o abd distension and pain/pressure. Lasix was doubled to 80mg BID 5 days ago. States he had 8L drained from abd a week ago b y Dr. Maya. Sent to ER now by [facility]. Will send pt to main ED for further evaluation.

## 2022-05-07 NOTE — ED PROVIDER NOTE - NS ED ROS FT
Constitutional: No chills. +fever  Eyes: No visual changes  HEENT: No throat pain  CV: No chest pain  Resp: No cough. +SOB  GI: No abd pain, nausea or vomiting. +distension  : No dysuria  MSK: No musculoskeletal pain  Skin: No rash  Neuro: No headache

## 2022-05-07 NOTE — ED ADULT NURSE NOTE - OBJECTIVE STATEMENT
Pt presents to ED sent in by Dr. Maya's office for fever of 102 at home. Pt had a paracentesis done 2 Fridays ago. Pt states he has been having increased abd distention since monday. Pt last took tylenol at 1900 today. Pt denies chest pain, SOB, headache, dizziness, naausea/vomiting, denies sick contacts.

## 2022-05-07 NOTE — ED STATDOCS - NSICDXPASTSURGICALHX_GEN_ALL_CORE_FT
PAST SURGICAL HISTORY:  History of tonsillectomy and adenoidectomy as child    S/P abdominal paracentesis

## 2022-05-07 NOTE — ED PROVIDER NOTE - PHYSICAL EXAMINATION
Constitutional: NAD AAOx3  Eyes: PERRLA EOMI  Head: Normocephalic atraumatic  Mouth: MMM  Cardiac: regular rate. 2+ LE edema  Resp: Lungs CTAB  GI: Abd s/nt, no rebound or guarding. +Distended abdomen, +Fluid wave  Neuro: awake, alert, moving all extremities, cranial nerves 2-12 intact, sensation intact, no dysmetria.  Skin: No rashes

## 2022-05-07 NOTE — ED PROVIDER NOTE - PROGRESS NOTE DETAILS
pt febrile at home, took tylenol.. pt told to stop taking tylenol, concern for sbp and sent in by gi, pt nontender no guarding, afebrile, will give abx and admit, low suspicion for sbp

## 2022-05-07 NOTE — ED PROVIDER NOTE - OBJECTIVE STATEMENT
57 y/o male with a PMHx of EOTH abuse, alcoholic liver cirrhosis presents to the ED c/o fever. Pt states he has alcoholic cirrhosis and has had a 10lb weight over the past week. Pt notes he had 8L drained a week ago here by IR with Dr. Maya. Pt's wife noted pt felt warm at home, so took a temp which was 102. Pt took Tylenol for the fever and called Dr. Pro who referred him to the ED for evaluation. +SOB. No abdominal pain. Pt's Lasix was doubled to 80mg BID 5 days ago. Pt states he came to the ED mainly because his wife noted a fever.

## 2022-05-08 LAB
ALBUMIN SERPL ELPH-MCNC: 2 G/DL — LOW (ref 3.3–5)
ALP SERPL-CCNC: 76 U/L — SIGNIFICANT CHANGE UP (ref 40–120)
ALT FLD-CCNC: 32 U/L — SIGNIFICANT CHANGE UP (ref 12–78)
ANION GAP SERPL CALC-SCNC: 5 MMOL/L — SIGNIFICANT CHANGE UP (ref 5–17)
APPEARANCE UR: CLEAR — SIGNIFICANT CHANGE UP
AST SERPL-CCNC: 55 U/L — HIGH (ref 15–37)
BASOPHILS # BLD AUTO: 0.03 K/UL — SIGNIFICANT CHANGE UP (ref 0–0.2)
BASOPHILS NFR BLD AUTO: 0.3 % — SIGNIFICANT CHANGE UP (ref 0–2)
BILIRUB SERPL-MCNC: 4.5 MG/DL — HIGH (ref 0.2–1.2)
BILIRUB UR-MCNC: ABNORMAL
BUN SERPL-MCNC: 23 MG/DL — SIGNIFICANT CHANGE UP (ref 7–23)
CALCIUM SERPL-MCNC: 8.4 MG/DL — LOW (ref 8.5–10.1)
CHLORIDE SERPL-SCNC: 103 MMOL/L — SIGNIFICANT CHANGE UP (ref 96–108)
CO2 SERPL-SCNC: 26 MMOL/L — SIGNIFICANT CHANGE UP (ref 22–31)
COLOR SPEC: ABNORMAL
CREAT SERPL-MCNC: 1.24 MG/DL — SIGNIFICANT CHANGE UP (ref 0.5–1.3)
DIFF PNL FLD: NEGATIVE — SIGNIFICANT CHANGE UP
EGFR: 67 ML/MIN/1.73M2 — SIGNIFICANT CHANGE UP
EOSINOPHIL # BLD AUTO: 0.02 K/UL — SIGNIFICANT CHANGE UP (ref 0–0.5)
EOSINOPHIL NFR BLD AUTO: 0.2 % — SIGNIFICANT CHANGE UP (ref 0–6)
GLUCOSE SERPL-MCNC: 116 MG/DL — HIGH (ref 70–99)
GLUCOSE UR QL: NEGATIVE — SIGNIFICANT CHANGE UP
HCT VFR BLD CALC: 33.4 % — LOW (ref 39–50)
HGB BLD-MCNC: 11.4 G/DL — LOW (ref 13–17)
IMM GRANULOCYTES NFR BLD AUTO: 0.1 % — SIGNIFICANT CHANGE UP (ref 0–1.5)
KETONES UR-MCNC: ABNORMAL
LEUKOCYTE ESTERASE UR-ACNC: ABNORMAL
LYMPHOCYTES # BLD AUTO: 1.4 K/UL — SIGNIFICANT CHANGE UP (ref 1–3.3)
LYMPHOCYTES # BLD AUTO: 15.9 % — SIGNIFICANT CHANGE UP (ref 13–44)
MCHC RBC-ENTMCNC: 33.8 PG — SIGNIFICANT CHANGE UP (ref 27–34)
MCHC RBC-ENTMCNC: 34.1 GM/DL — SIGNIFICANT CHANGE UP (ref 32–36)
MCV RBC AUTO: 99.1 FL — SIGNIFICANT CHANGE UP (ref 80–100)
MONOCYTES # BLD AUTO: 0.81 K/UL — SIGNIFICANT CHANGE UP (ref 0–0.9)
MONOCYTES NFR BLD AUTO: 9.2 % — SIGNIFICANT CHANGE UP (ref 2–14)
NEUTROPHILS # BLD AUTO: 6.52 K/UL — SIGNIFICANT CHANGE UP (ref 1.8–7.4)
NEUTROPHILS NFR BLD AUTO: 74.3 % — SIGNIFICANT CHANGE UP (ref 43–77)
NITRITE UR-MCNC: NEGATIVE — SIGNIFICANT CHANGE UP
PH UR: 5 — SIGNIFICANT CHANGE UP (ref 5–8)
PLATELET # BLD AUTO: 53 K/UL — LOW (ref 150–400)
POTASSIUM SERPL-MCNC: 4.7 MMOL/L — SIGNIFICANT CHANGE UP (ref 3.5–5.3)
POTASSIUM SERPL-SCNC: 4.7 MMOL/L — SIGNIFICANT CHANGE UP (ref 3.5–5.3)
PROT SERPL-MCNC: 4.8 GM/DL — LOW (ref 6–8.3)
PROT UR-MCNC: 30 MG/DL
RBC # BLD: 3.37 M/UL — LOW (ref 4.2–5.8)
RBC # FLD: 14 % — SIGNIFICANT CHANGE UP (ref 10.3–14.5)
SODIUM SERPL-SCNC: 134 MMOL/L — LOW (ref 135–145)
SP GR SPEC: 1.02 — SIGNIFICANT CHANGE UP (ref 1.01–1.02)
UROBILINOGEN FLD QL: NEGATIVE — SIGNIFICANT CHANGE UP
WBC # BLD: 8.79 K/UL — SIGNIFICANT CHANGE UP (ref 3.8–10.5)
WBC # FLD AUTO: 8.79 K/UL — SIGNIFICANT CHANGE UP (ref 3.8–10.5)

## 2022-05-08 PROCEDURE — 99233 SBSQ HOSP IP/OBS HIGH 50: CPT

## 2022-05-08 PROCEDURE — 99222 1ST HOSP IP/OBS MODERATE 55: CPT

## 2022-05-08 RX ORDER — CEFTRIAXONE 500 MG/1
2000 INJECTION, POWDER, FOR SOLUTION INTRAMUSCULAR; INTRAVENOUS EVERY 24 HOURS
Refills: 0 | Status: DISCONTINUED | OUTPATIENT
Start: 2022-05-08 | End: 2022-05-12

## 2022-05-08 RX ORDER — FUROSEMIDE 40 MG
80 TABLET ORAL
Refills: 0 | Status: DISCONTINUED | OUTPATIENT
Start: 2022-05-08 | End: 2022-05-08

## 2022-05-08 RX ORDER — CEFTRIAXONE 500 MG/1
2000 INJECTION, POWDER, FOR SOLUTION INTRAMUSCULAR; INTRAVENOUS EVERY 24 HOURS
Refills: 0 | Status: DISCONTINUED | OUTPATIENT
Start: 2022-05-08 | End: 2022-05-08

## 2022-05-08 RX ORDER — FUROSEMIDE 40 MG
40 TABLET ORAL
Refills: 0 | Status: DISCONTINUED | OUTPATIENT
Start: 2022-05-08 | End: 2022-05-10

## 2022-05-08 RX ORDER — VANCOMYCIN HCL 1 G
1750 VIAL (EA) INTRAVENOUS EVERY 12 HOURS
Refills: 0 | Status: DISCONTINUED | OUTPATIENT
Start: 2022-05-08 | End: 2022-05-08

## 2022-05-08 RX ORDER — EPLERENONE 50 MG/1
50 TABLET, FILM COATED ORAL DAILY
Refills: 0 | Status: DISCONTINUED | OUTPATIENT
Start: 2022-05-08 | End: 2022-05-10

## 2022-05-08 RX ORDER — ALBUMIN HUMAN 25 %
100 VIAL (ML) INTRAVENOUS ONCE
Refills: 0 | Status: DISCONTINUED | OUTPATIENT
Start: 2022-05-08 | End: 2022-05-08

## 2022-05-08 RX ORDER — ONDANSETRON 8 MG/1
4 TABLET, FILM COATED ORAL EVERY 8 HOURS
Refills: 0 | Status: DISCONTINUED | OUTPATIENT
Start: 2022-05-08 | End: 2022-05-12

## 2022-05-08 RX ADMIN — Medication 80 MILLIGRAM(S): at 01:20

## 2022-05-08 RX ADMIN — EPLERENONE 50 MILLIGRAM(S): 50 TABLET, FILM COATED ORAL at 12:31

## 2022-05-08 RX ADMIN — Medication 40 MILLIGRAM(S): at 11:37

## 2022-05-08 RX ADMIN — Medication 250 MILLIGRAM(S): at 05:11

## 2022-05-08 RX ADMIN — CEFTRIAXONE 100 MILLIGRAM(S): 500 INJECTION, POWDER, FOR SOLUTION INTRAMUSCULAR; INTRAVENOUS at 11:37

## 2022-05-08 RX ADMIN — Medication 40 MILLIGRAM(S): at 22:09

## 2022-05-08 NOTE — H&P ADULT - HISTORY OF PRESENT ILLNESS
55 y/o M PMHx of alcoholic cirrhosis, EtOH abuse (last drink was reportedly on 10/31/2021), esophageal varices, left pleural effusion, chronic partial portal vein thrombosis (on Apixaban) who was referred to the ED by his Gastroenterologist for further evaluation for worsening ascites, abdominal pain, and subjective fevers at home (Tmax 102'F). Of note the patient is s/p US guided therapeutic paracentesis by Interventional Radiology on 4/29/2022 at  with 8000cc ascites removed. Additionally the patient c/o shortness of breath despite his Furosemide dose being increased to 80mg po BID 5 days ago. Labs => PLT 65, INR 1.49, Na 134, Alb 2.7, TBili 4.8, AST 69, LA 2.2. In the ED the patient received Ceftriaxone 2g IVPB x 1, and Ibuprofen 400mg PO x 1.

## 2022-05-08 NOTE — DIETITIAN INITIAL EVALUATION ADULT - ADD RECOMMEND
Rx MVI, thiamine, folic acid supplementation daily; Encourage HBV protein sources; Monitor weights daily for trend/accuracy

## 2022-05-08 NOTE — CONSULT NOTE ADULT - SUBJECTIVE AND OBJECTIVE BOX
Patient is a 56y old  Male who presents with a chief complaint of Abdominal Pain, Subjective Fevers, Weight Loss (08 May 2022 00:33)    FULL NOTE TO FOLLOW.     JUST DIAGNOSTIC TAP, NOT LVP, AS HE CAN GO INTO RENAL FAILURE IF THIS IS SBP.     PAN CULTURE. ABX.     ALBUMIN PENDING DIAGNOSTIC TAP      HPI:  57 y/o M PMHx of alcoholic cirrhosis, EtOH abuse (last drink was reportedly on 10/31/2021), esophageal varices, left pleural effusion, chronic partial portal vein thrombosis (on Apixaban) who was referred to the [facility] by his Gastroenterologist for further evaluation for worsening ascites, abdominal pain, and subjective fevers at home (Tmax 102'F). Of note the patient is s/p US guided therapeutic paracentesis by Interventional Radiology on 4/29/2022 at  with 8000cc ascites removed. Additionally the patient c/o shortness of breath despite his Furosemide dose being increased to 80mg po BID 5 days ago. Labs => PLT 65, INR 1.49, Na 134, Alb 2.7, TBili 4.8, AST 69, LA 2.2. In the ED the patient received Ceftriaxone 2g IVPB x 1, and Ibuprofen 400mg PO x 1.    (08 May 2022 00:33)      PAST MEDICAL & SURGICAL HISTORY:  ETOH abuse    Alcoholic fatty liver    Thrombocytopenia concurrent with and due to alcoholism    Cirrhosis of liver with ascites    S/P abdominal paracentesis    History of tonsillectomy and adenoidectomy  as child        MEDICATIONS  (STANDING):  cefTRIAXone   IVPB 2000 milliGRAM(s) IV Intermittent every 24 hours  eplerenone 50 milliGRAM(s) Oral daily  furosemide    Tablet 80 milliGRAM(s) Oral two times a day  furosemide   Injectable 40 milliGRAM(s) IV Push two times a day  vancomycin  IVPB 1750 milliGRAM(s) IV Intermittent every 12 hours    MEDICATIONS  (PRN):  ondansetron Injectable 4 milliGRAM(s) IV Push every 8 hours PRN Nausea and/or Vomiting      Allergies    No Known Allergies    Intolerances        SOCIAL HISTORY:    FAMILY HISTORY:  No pertinent family history in first degree relatives        REVIEW OF SYSTEMS:    CONSTITUTIONAL: No weakness, fevers or chills  EYES/ENT: No visual changes;  No vertigo or throat pain   NECK: No pain or stiffness  RESPIRATORY: No cough, wheezing, hemoptysis; No shortness of breath  CARDIOVASCULAR: No chest pain or palpitations  GASTROINTESTINAL: No abdominal or epigastric pain. No nausea, vomiting, or hematemesis; No diarrhea or constipation. No melena or hematochezia.  GENITOURINARY: No dysuria, frequency or hematuria  NEUROLOGICAL: No numbness or weakness  SKIN: No itching, burning, rashes, or lesions   PSYCH: Normal mood and affect  All other review of systems is negative unless indicated above.    Vital Signs Last 24 Hrs  T(C): 37.3 (08 May 2022 07:49), Max: 37.8 (08 May 2022 00:37)  T(F): 99.1 (08 May 2022 07:49), Max: 100 (08 May 2022 00:37)  HR: 78 (08 May 2022 07:49) (78 - 96)  BP: 98/46 (08 May 2022 07:49) (98/46 - 119/70)  BP(mean): 82 (07 May 2022 23:36) (82 - 87)  RR: 18 (08 May 2022 07:49) (18 - 19)  SpO2: 95% (08 May 2022 07:49) (94% - 100%)    PHYSICAL EXAM:    Constitutional: No acute distress, well-developed, non-toxic appearing  HEENT: masked, good phonation, not icteric  Neck: supple, no lymphadenopathy  Respiratory: clear to ascultation bilaterally, no wheezing  Cardiovascular: S1 and S2, regular rate and rhythm, no murmurs rubs or gallops  Gastrointestinal: soft, non-tender, non-distended, +bowel sounds, no rebound or guarding, no surgical scars, no drains  Extremities: No peripheral edema, no cyanosis or clubbing  Vascular: 2+ peripheral pulses, no venous stasis  Neurological: A/O x 3, no focal deficits, no asterixis  Psychiatric: Normal mood, normal affect  Skin: No rashes, not jaundiced    LABS:                        11.4   8.79  )-----------( 53       ( 08 May 2022 08:05 )             33.4     05-07    134<L>  |  101  |  18  ----------------------------<  102<H>  4.3   |  26  |  1.01    Ca    8.5      07 May 2022 22:03  Mg     2.1     05-07    TPro  6.1  /  Alb  2.7<L>  /  TBili  4.8<H>  /  DBili  x   /  AST  69<H>  /  ALT  41  /  AlkPhos  108  05-07      LIVER FUNCTIONS - ( 07 May 2022 22:03 )  Alb: 2.7 g/dL / Pro: 6.1 gm/dL / ALK PHOS: 108 U/L / ALT: 41 U/L / AST: 69 U/L / GGT: x             RADIOLOGY & ADDITIONAL STUDIES: Patient is a 56y old  Male who presents with a chief complaint of Abdominal Pain, Subjective Fevers, Weight Loss (08 May 2022 00:33)    56 year old man with decompensated ETOH cirrhosis (last drink 7 months ago), PVT on apixaban, admitted for abdominal pain and fevers.     Patient states he developed worsening abdominal discomfort over the past week due to his sevre reaccumulation of fluid. Discomfort is diffuse, 4/10, non radiating, dull/pressure like. No alleviating or known exacerbating factors (other than the fluid). Had a large volume paracentesis 10 days ago (8 liters off) and despite being compliant on his diuretics (max dose), still rapidly accumulated volume. He was dealing with this but yesterday developed fevers at home, documented at 102. This made him come to ED. Denies any sick contacts or travel history. No diarrhea. No chills. No overt signs of GI bleeding like hematemesis, hematochezia, melena.     Currently he is afebrile and feels ok, but still has discomfort in his abdomen. No other localizing symptoms.       PAST MEDICAL & SURGICAL HISTORY:  ETOH abuse (sober 7 months)    Alcoholic fatty liver    Thrombocytopenia concurrent with and due to alcoholism    Cirrhosis of liver with ascites    S/P abdominal paracentesis    History of tonsillectomy and adenoidectomy  as child        MEDICATIONS  (STANDING):  cefTRIAXone   IVPB 2000 milliGRAM(s) IV Intermittent every 24 hours  eplerenone 50 milliGRAM(s) Oral daily  furosemide    Tablet 80 milliGRAM(s) Oral two times a day  furosemide   Injectable 40 milliGRAM(s) IV Push two times a day  vancomycin  IVPB 1750 milliGRAM(s) IV Intermittent every 12 hours    MEDICATIONS  (PRN):  ondansetron Injectable 4 milliGRAM(s) IV Push every 8 hours PRN Nausea and/or Vomiting      Allergies    No Known Allergies      SOCIAL HISTORY:  no current drinking, sober x 7 months, no smoking, no drugs    FAMILY HISTORY:  No pertinent family history in first degree relatives  no colon or stomach cancer        REVIEW OF SYSTEMS:    CONSTITUTIONAL: No weakness, +fevers no chills  EYES/ENT: No visual changes;  No vertigo or throat pain   NECK: No pain or stiffness  RESPIRATORY: No cough, wheezing, hemoptysis; No shortness of breath  CARDIOVASCULAR: No chest pain or palpitations  GASTROINTESTINAL: see HPI  GENITOURINARY: No dysuria, frequency or hematuria  NEUROLOGICAL: No numbness or weakness  SKIN: No itching, burning, rashes, or lesions   PSYCH: Normal mood and affect  All other review of systems is negative unless indicated above.    Vital Signs Last 24 Hrs  T(C): 37.3 (08 May 2022 07:49), Max: 37.8 (08 May 2022 00:37)  T(F): 99.1 (08 May 2022 07:49), Max: 100 (08 May 2022 00:37)  HR: 78 (08 May 2022 07:49) (78 - 96)  BP: 98/46 (08 May 2022 07:49) (98/46 - 119/70)  BP(mean): 82 (07 May 2022 23:36) (82 - 87)  RR: 18 (08 May 2022 07:49) (18 - 19)  SpO2: 95% (08 May 2022 07:49) (94% - 100%)    PHYSICAL EXAM:    Constitutional: No acute distress, non-toxic appearing, pleasant  HEENT: un-masked, good phonation, not icteric  Neck: supple, no lymphadenopathy  Respiratory: clear to ascultation bilaterally, no wheezing  Cardiovascular: S1 and S2, regular rate and rhythm, no murmurs rubs or gallops  Gastrointestinal: soft, tender to deep palpation, + distended (ascites), +bowel sounds, no rebound or guarding, no drains  Extremities: No peripheral edema, no cyanosis or clubbing  Vascular: 2+ peripheral pulses, no venous stasis  Neurological: A/O x 3, no focal deficits, no asterixis  Psychiatric: Normal mood, normal affect  Skin: No rashes, not jaundiced    LABS:                        11.4   8.79  )-----------( 53       ( 08 May 2022 08:05 )             33.4     05-07    134<L>  |  101  |  18  ----------------------------<  102<H>  4.3   |  26  |  1.01    Ca    8.5      07 May 2022 22:03  Mg     2.1     05-07    TPro  6.1  /  Alb  2.7<L>  /  TBili  4.8<H>  /  DBili  x   /  AST  69<H>  /  ALT  41  /  AlkPhos  108  05-07      LIVER FUNCTIONS - ( 07 May 2022 22:03 )  Alb: 2.7 g/dL / Pro: 6.1 gm/dL / ALK PHOS: 108 U/L / ALT: 41 U/L / AST: 69 U/L / GGT: x

## 2022-05-08 NOTE — H&P ADULT - ASSESSMENT
55 y/o M PMHx of alcoholic cirrhosis, EtOH abuse (last drink was reportedly on 10/31/2021), esophageal varices, left pleural effusion, chronic partial portal vein thrombosis (on Apixaban) who was referred to the ED by his Gastroenterologist for further evaluation for worsening ascites, abdominal pain, and subjective fevers at home (Tmax 102'F). Of note the patient is s/p US guided therapeutic paracentesis by Interventional Radiology on 4/29/2022 at  with 8000cc ascites removed. Additionally the patient c/o shortness of breath despite his Furosemide dose being increased to 80mg po BID 5 days ago. Labs => PLT 65, INR 1.49, Na 134, Alb 2.7, TBili 4.8, AST 69, LA 2.2. In the ED the patient received Ceftriaxone 2g IVPB x 1, and Ibuprofen 400mg PO x 1.  55 y/o M PMHx of alcoholic cirrhosis, EtOH abuse (last drink was reportedly on 10/31/2021), esophageal varices, left pleural effusion, chronic partial portal vein thrombosis (on Apixaban) who was referred to the ED by his Gastroenterologist for further evaluation for worsening ascites, abdominal pain, and subjective fevers at home (Tmax 102'F). Of note the patient is s/p US guided therapeutic paracentesis by Interventional Radiology on 4/29/2022 at  with 8000cc ascites removed. Additionally the patient c/o shortness of breath despite his Furosemide dose being increased to 80mg po BID 5 days ago. Labs => PLT 65, INR 1.49, Na 134, Alb 2.7, TBili 4.8, AST 69, LA 2.2. In the ED the patient received Ceftriaxone 2g IVPB x 1, and Ibuprofen 400mg PO x 1.     #Large Ascites due to Decompensated Alcoholic Cirrhosis with Probable Spontaneous Bacterial Peritonitis  #Small Left Pleural Effusion  #Transaminitis  #Thrombocytopenia  ~admit to Medicine  ~f/u PAN C+S (Blood C+S x 2)  ~cont. Ceftriaxone 2g IVPB daily + Vancomycin 1g IVPB q12h  ~f/u Vancomycin trough after 4th dose  ~f/u w/ ID consultation  ~f/u w/ GI consultation in the am  ~NPO after MN for diagnostic and therapeutic abdominal paracentesis in the am by Interventional Radiology  ~f/u ascitic fluid analysis including culture  ~prior charts reviewed  ~will hold am Apixaban therapy for possible paracentesis (please restart prn)  ~note; indication for anticoagulation is for Portal Vein thrombosis  ~cont. Eplerenone 50mg po daily  ~cont. Lasix 40mg IVP bid (takes 80mg po bid at home)  ~strict I/Os  ~daily weights  ~cont. low Na diet     #Portal vein thrombosis  ~as noted plan is to hold off A/C for now until diagnostic and therapeutic abdominal paracentesis in the am     #Vte ppx  ~as above Apixaban on hold  ~cont. SCDs for now

## 2022-05-08 NOTE — DIETITIAN INITIAL EVALUATION ADULT - ETIOLOGY
related to inability to meet sufficient protein-energy needs in setting of liver cirrhosis with ascites, ETOH abuse

## 2022-05-08 NOTE — DIETITIAN INITIAL EVALUATION ADULT - ORAL INTAKE PTA/DIET HISTORY
Pt reports having good PO intake and a healthy appetite, tries to follow DASH/TLC diet at home. Pt states UBW 220lbs, current weight 253lbs noted with ascites and 3+ B/L LE edema. Pt s/p paracentesis 4/29 with 8000mL fluid drained. Discussed low Na tips, importance of adequate protein intake. Pt receptive to information provided, all questions answered, RD to remain available.

## 2022-05-08 NOTE — H&P ADULT - EYES
Attempted to get of hold of patient needing to confirmed  If patient had completed her labs that were order back in April, phone number in system keeps dialing a busy tone.   detailed exam PERRL

## 2022-05-08 NOTE — H&P ADULT - NSHPPHYSICALEXAM_GEN_ALL_CORE
Vital Signs Last 24 Hrs  T(C): 37.7 (07 May 2022 23:36), Max: 37.7 (07 May 2022 23:36)  T(F): 99.8 (07 May 2022 23:36), Max: 99.8 (07 May 2022 23:36)  HR: 86 (07 May 2022 23:36) (86 - 96)  BP: 111/69 (07 May 2022 23:36) (111/69 - 119/70)  BP(mean): 82 (07 May 2022 23:36) (82 - 87)  RR: 18 (07 May 2022 23:36) (18 - 19)  SpO2: 100% (07 May 2022 23:36) (94% - 100%)

## 2022-05-08 NOTE — H&P ADULT - NSHPSOCIALHISTORY_GEN_ALL_CORE
Lives at home  History of drinking beer daily for 30+ years. Last drink was on 10/31/2021  Denies tobacco  Marijuana use

## 2022-05-08 NOTE — DIETITIAN NUTRITION RISK NOTIFICATION - ADDITIONAL COMMENTS/DIETITIAN RECOMMENDATIONS
1) Change to DASH/TLC  2) Rx MVI, thiamine and folic acid supplementation daily  3) Encourage HBV protein sources  4) Monitor weights daily for trend/accuracy

## 2022-05-08 NOTE — CONSULT NOTE ADULT - SUBJECTIVE AND OBJECTIVE BOX
Patient is a 56y old  Male who presents with a chief complaint of Abdominal Pain, Subjective Fevers, Weight Loss     HPI:  55 y/o Male with h/o alcoholic cirrhosis, esophageal varices, left pleural effusion, chronic partial portal vein thrombosis (on Apixaban) was admitted on  after he was referred to the [facility] by his Gastroenterologist for further evaluation for worsening ascites, abdominal pain, and subjective fevers at home (Tmax 102'F). Of note the patient is s/p US guided therapeutic paracentesis by Interventional Radiology on 2022 at  with 8000cc ascites removed. Additionally the patient has shortness of breath despite his Furosemide dose being increased to 80mg po BID 5 days ago. In ER he received ceftriaxone and vancomycin IV.     PMH: as above  PSH: as above  Meds: per reconciliation sheet, noted below  MEDICATIONS  (STANDING):  cefTRIAXone   IVPB 2000 milliGRAM(s) IV Intermittent every 24 hours  eplerenone 50 milliGRAM(s) Oral daily  furosemide   Injectable 40 milliGRAM(s) IV Push two times a day  vancomycin  IVPB 1750 milliGRAM(s) IV Intermittent every 12 hours    MEDICATIONS  (PRN):  ondansetron Injectable 4 milliGRAM(s) IV Push every 8 hours PRN Nausea and/or Vomiting    Allergies    No Known Allergies    Intolerances      Social: no smoking, EtOH abuse (last drink was reportedly on 10/31/2021), no illegal drugs; no recent travel, no exposure to TB  FAMILY HISTORY:  No pertinent family history in first degree relatives      no history of premature cardiovascular disease in first degree relatives    ROS: the patient denies fever, no chills, no HA, no seizures, no dizziness, no sore throat, no nasal congestion, no blurry vision, no CP, no palpitations, no SOB, no cough, has abdominal pain and distention, no diarrhea, no N/V, no dysuria, no leg pain, no claudication, no rash, no joint aches, no rectal pain or bleeding, no night sweats  All other systems reviewed and are negative    Vital Signs Last 24 Hrs  T(C): 37.3 (08 May 2022 07:49), Max: 37.8 (08 May 2022 00:37)  T(F): 99.1 (08 May 2022 07:49), Max: 100 (08 May 2022 00:37)  HR: 78 (08 May 2022 07:49) (78 - 96)  BP: 98/46 (08 May 2022 07:49) (98/46 - 119/70)  BP(mean): 82 (07 May 2022 23:36) (82 - 87)  RR: 18 (08 May 2022 07:49) (18 - 19)  SpO2: 95% (08 May 2022 07:49) (94% - 100%)  Daily Height in cm: 187.96 (07 May 2022 21:08)    Daily     PE:    Constitutional:  No acute distress  HEENT: NC/AT, EOMI, PERRLA, conjunctivae clear; ears and nose atraumatic; pharynx benign  Neck: supple; thyroid not palpable  Back: no tenderness  Respiratory: respiratory effort normal; clear to auscultation  Cardiovascular: S1S2 regular, no murmurs  Abdomen: soft, mild periombilical tender, distended, positive BS; no liver or spleen organomegaly  Genitourinary: no suprapubic tenderness  Lymphatic: no LN palpable  Musculoskeletal: no muscle tenderness, no joint swelling or tenderness  Extremities: no pedal edema  Neurological/ Psychiatric: AxOx3, judgement and insight normal; moving all extremities  Skin: no rashes; no palpable lesions    Labs: all available labs reviewed                        11.4   8.79  )-----------( 53       ( 08 May 2022 08:05 )             33.4     05-08    134<L>  |  103  |  23  ----------------------------<  116<H>  4.7   |  26  |  1.24    Ca    8.4<L>      08 May 2022 08:05  Mg     2.1     05-07    TPro  4.8<L>  /  Alb  2.0<L>  /  TBili  4.5<H>  /  DBili  x   /  AST  55<H>  /  ALT  32  /  AlkPhos  76  05-08     LIVER FUNCTIONS - ( 08 May 2022 08:05 )  Alb: 2.0 g/dL / Pro: 4.8 gm/dL / ALK PHOS: 76 U/L / ALT: 32 U/L / AST: 55 U/L / GGT: x           Urinalysis Basic - ( 08 May 2022 03:08 )    Color: Nena / Appearance: Clear / S.025 / pH: x  Gluc: x / Ketone: Small  / Bili: Small / Urobili: Negative   Blood: x / Protein: 30 mg/dL / Nitrite: Negative   Leuk Esterase: Trace / RBC: 3-5 /HPF / WBC 3-5   Sq Epi: x / Non Sq Epi: Few / Bacteria: Few    Radiology: all available radiological tests reviewed        Advanced directives addressed: full resuscitation

## 2022-05-08 NOTE — DIETITIAN INITIAL EVALUATION ADULT - OTHER INFO
55 y/o M PMHx of alcoholic cirrhosis, EtOH abuse (last drink was reportedly on 10/31/2021), esophageal varices, left pleural effusion, chronic partial portal vein thrombosis who was referred by Gastroenterologist for further evaluation for worsening ascites, abdominal pain, and subjective fevers at home. Of note the patient is s/p US guided therapeutic paracentesis by Interventional Radiology on 4/29/2022 with 8000cc ascites removed. Additionally the patient c/o SOB despite his Furosemide dose being increased.

## 2022-05-08 NOTE — PROGRESS NOTE ADULT - SUBJECTIVE AND OBJECTIVE BOX
55 y/o M PMHx of alcoholic cirrhosis, EtOH abuse (last drink was reportedly on 10/31/2021), esophageal varices, left pleural effusion, chronic partial portal vein thrombosis (on Apixaban) who was referred to the ED by his Gastroenterologist for further evaluation for worsening ascites, abdominal pain, and subjective fevers at home (Tmax 102'F). Of note the patient is s/p US guided therapeutic paracentesis by Interventional Radiology on 2022 at  with 8000cc ascites removed. Additionally the patient c/o shortness of breath despite his Furosemide dose being increased to 80mg po BID 5 days ago. Labs => PLT 65, INR 1.49, Na 134, Alb 2.7, TBili 4.8, AST 69, LA 2.2. In the ED the patient received Ceftriaxone 2g IVPB x 1, and Ibuprofen 400mg PO x 1.       22: Patient seen and examined. Abd pain better today.       Vital Signs Last 24 Hrs  T(C): 37.3 (08 May 2022 07:49), Max: 37.8 (08 May 2022 00:37)  T(F): 99.1 (08 May 2022 07:49), Max: 100 (08 May 2022 00:37)  HR: 78 (08 May 2022 07:49) (78 - 96)  BP: 98/46 (08 May 2022 07:49) (98/46 - 119/70)  BP(mean): 82 (07 May 2022 23:36) (82 - 87)  RR: 18 (08 May 2022 07:49) (18 - 19)  SpO2: 95% (08 May 2022 07:49) (94% - 100%)      Physical Exam:       Constitutional: NAD AAOx3  	Eyes: PERRLA EOMI  	Head: Normocephalic atraumatic  	Mouth: MMM  	Cardiac: regular rate. 2+ LE edema  	Resp: Lungs CTAB  	GI: Abd s/nt, no rebound or guarding. +Distended abdomen, +Fluid wave  	Neuro: awake, alert, moving all extremities, cranial nerves 2-12 intact, sensation intact, no dysmetria.  Skin: No rashes                                  11.4   8.79  )-----------( 53       ( 08 May 2022 08:05 )             33.4     08 May 2022 08:05    134    |  103    |  23     ----------------------------<  116    4.7     |  26     |  1.24     Ca    8.4        08 May 2022 08:05  Mg     2.1       07 May 2022 22:03    TPro  4.8    /  Alb  2.0    /  TBili  4.5    /  DBili  x      /  AST  55     /  ALT  32     /  AlkPhos  76     08 May 2022 08:05    LIVER FUNCTIONS - ( 08 May 2022 08:05 )  Alb: 2.0 g/dL / Pro: 4.8 gm/dL / ALK PHOS: 76 U/L / ALT: 32 U/L / AST: 55 U/L / GGT: x             CAPILLARY BLOOD GLUCOSE            Urinalysis Basic - ( 08 May 2022 03:08 )    Color: Nena / Appearance: Clear / S.025 / pH: x  Gluc: x / Ketone: Small  / Bili: Small / Urobili: Negative   Blood: x / Protein: 30 mg/dL / Nitrite: Negative   Leuk Esterase: Trace / RBC: 3-5 /HPF / WBC 3-5   Sq Epi: x / Non Sq Epi: Few / Bacteria: Few          MEDICATIONS  (STANDING):  cefTRIAXone   IVPB 2000 milliGRAM(s) IV Intermittent every 24 hours  eplerenone 50 milliGRAM(s) Oral daily  furosemide   Injectable 40 milliGRAM(s) IV Push two times a day    MEDICATIONS  (PRN):  ondansetron Injectable 4 milliGRAM(s) IV Push every 8 hours PRN Nausea and/or Vomiting            Assessment:  · Assessment	  55 y/o M PMHx of alcoholic cirrhosis, EtOH abuse (last drink was reportedly on 10/31/2021), esophageal varices, left pleural effusion, chronic partial portal vein thrombosis (on Apixaban) who was referred to the [facility] by his Gastroenterologist for further evaluation for worsening ascites, abdominal pain, and subjective fevers at home (Tmax 102'F). Of note the patient is s/p US guided therapeutic paracentesis by Interventional Radiology on 2022 at  with 8000cc ascites removed. Additionally the patient c/o shortness of breath despite his Furosemide dose being increased to 80mg po BID 5 days ago. Labs => PLT 65, INR 1.49, Na 134, Alb 2.7, TBili 4.8, AST 69, LA 2.2. In the ED the patient received Ceftriaxone 2g IVPB x 1, and Ibuprofen 400mg PO x 1.     #Large Ascites due to Decompensated Alcoholic Cirrhosis with Probable Spontaneous Bacterial Peritonitis  #Small Left Pleural Effusion  #Transaminitis  #Thrombocytopenia    ~f/u PAN C+S (Blood C+S x 2)  ~cont. Ceftriaxone 2g IVPB daily   ~ ID consultation appreciated  ~Dr Bae consult appreciated  ~f/u ascitic fluid analysis including culture after  paracentesis  ~cont. Eplerenone 50mg po daily  ~cont. Lasix 40mg IVP bid (takes 80mg po bid at home)  ~strict I/Os  ~daily weights    #Portal vein thrombosis  ~as noted plan is to hold off A/C for now until abdominal paracentesis tomorrow     #Vte ppx  ~as above Apixaban on hold  ~cont. SCDs for now

## 2022-05-08 NOTE — DIETITIAN INITIAL EVALUATION ADULT - PERTINENT LABORATORY DATA
05-08    134<L>  |  103  |  23  ----------------------------<  116<H>  4.7   |  26  |  1.24    Ca    8.4<L>      08 May 2022 08:05  Mg     2.1     05-07    TPro  4.8<L>  /  Alb  2.0<L>  /  TBili  4.5<H>  /  DBili  x   /  AST  55<H>  /  ALT  32  /  AlkPhos  76  05-08  
PROBLEM DIAGNOSES  Problem: Dental caries  Assessment and Plan[place]19 [place] [facility] [place]    Problem: Immune thrombocytopenia  Assessment and Plan: Pt will receive Nplate in heme clinic on the morning of his dental procedure and  have CBC checked that morning as well. Goal platelets are greater than 70 in order to have dental procedure.

## 2022-05-08 NOTE — DIETITIAN NUTRITION RISK NOTIFICATION - TREATMENT: THE FOLLOWING DIET HAS BEEN RECOMMENDED
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
Diet, Regular (05-08-22 @ 12:16) [Active]

## 2022-05-08 NOTE — CONSULT NOTE ADULT - ASSESSMENT
56 year old man with decompensated ETOH cirrhosis (last drink 7 months ago), PVT on apixaban, admitted for abdominal pain and fevers.     Current MELD 17.     Pan culture patient, although concern is for bacterial peritonitis considering recent instrumentation.   IV abx.   Please obtain diagnostic paracentesis (not a large volume, as if he has SBP he can go into renal failure). If has values consistent with SBP, in addition to abx would need 1/5g/kg of 25% human albumin on day 1 (of diagnosis) and 1g/k on day 3.     Hold diuretics until we obtain paracentesis, his BP running low. If needs fluids would favor albumin 25%.     As he is on max dose lasix, didn't tolerate spironolactone, may need to consider TIPS at some point but clearly not now and defer that to primary GI/transplant hepatology.

## 2022-05-08 NOTE — DIETITIAN INITIAL EVALUATION ADULT - PERTINENT MEDS FT
MEDICATIONS  (STANDING):  cefTRIAXone   IVPB 2000 milliGRAM(s) IV Intermittent every 24 hours  eplerenone 50 milliGRAM(s) Oral daily  furosemide   Injectable 40 milliGRAM(s) IV Push two times a day    MEDICATIONS  (PRN):  ondansetron Injectable 4 milliGRAM(s) IV Push every 8 hours PRN Nausea and/or Vomiting

## 2022-05-08 NOTE — CONSULT NOTE ADULT - ASSESSMENT
57 y/o Male with h/o alcoholic cirrhosis, esophageal varices, left pleural effusion, chronic partial portal vein thrombosis (on Apixaban) was admitted on 5/7 after he was referred to the [facility] by his Gastroenterologist for further evaluation for worsening ascites, abdominal pain, and subjective fevers at home (Tmax 102'F). Of note the patient is s/p US guided therapeutic paracentesis by Interventional Radiology on 4/29/2022 at  with 8000cc ascites removed. Additionally the patient has shortness of breath despite his Furosemide dose being increased to 80mg po BID 5 days ago. In ER he received ceftriaxone and vancomycin IV.     1. Febrile syndrome. Alcoholic cirrhosis. Probable SBP.    57 y/o Male with h/o alcoholic cirrhosis, esophageal varices, left pleural effusion, chronic partial portal vein thrombosis (on Apixaban) was admitted on 5/7 after he was referred to the [facility] by his Gastroenterologist for further evaluation for worsening ascites, abdominal pain, and subjective fevers at home (Tmax 102'F). Of note the patient is s/p US guided therapeutic paracentesis by Interventional Radiology on 4/29/2022 at  with 8000cc ascites removed. Additionally the patient has shortness of breath despite his Furosemide dose being increased to 80mg po BID 5 days ago. In ER he received ceftriaxone and vancomycin IV.     1. Febrile syndrome. Alcoholic cirrhosis. Probable SBP.   -obtain BC x 2  -start ceftriaxone 2 gm IV qd  -reason for abx use and side effects reviewed with patient; monitor BMP   -GI evaluation   -plan for paracentesis - to send fluid for analysis and culture  -monitor abdomen  -old chart reviewed to assess prior cultures  -monitor temps  -f/u CBC  -supportive care  2. Other issues:   -care per medicine

## 2022-05-09 ENCOUNTER — RESULT REVIEW (OUTPATIENT)
Age: 57
End: 2022-05-09

## 2022-05-09 LAB
ALBUMIN FLD-MCNC: 0.2 G/DL — SIGNIFICANT CHANGE UP
ALBUMIN SERPL ELPH-MCNC: 2.2 G/DL — LOW (ref 3.3–5)
ALP SERPL-CCNC: 84 U/L — SIGNIFICANT CHANGE UP (ref 40–120)
ALT FLD-CCNC: 33 U/L — SIGNIFICANT CHANGE UP (ref 12–78)
ANION GAP SERPL CALC-SCNC: 6 MMOL/L — SIGNIFICANT CHANGE UP (ref 5–17)
ANION GAP SERPL CALC-SCNC: 6 MMOL/L — SIGNIFICANT CHANGE UP (ref 5–17)
AST SERPL-CCNC: 50 U/L — HIGH (ref 15–37)
B PERT IGG+IGM PNL SER: ABNORMAL
BILIRUB SERPL-MCNC: 1.9 MG/DL — HIGH (ref 0.2–1.2)
BUN SERPL-MCNC: 24 MG/DL — HIGH (ref 7–23)
BUN SERPL-MCNC: 25 MG/DL — HIGH (ref 7–23)
CALCIUM SERPL-MCNC: 7.8 MG/DL — LOW (ref 8.5–10.1)
CALCIUM SERPL-MCNC: 7.9 MG/DL — LOW (ref 8.5–10.1)
CHLORIDE SERPL-SCNC: 103 MMOL/L — SIGNIFICANT CHANGE UP (ref 96–108)
CHLORIDE SERPL-SCNC: 104 MMOL/L — SIGNIFICANT CHANGE UP (ref 96–108)
CO2 SERPL-SCNC: 26 MMOL/L — SIGNIFICANT CHANGE UP (ref 22–31)
CO2 SERPL-SCNC: 26 MMOL/L — SIGNIFICANT CHANGE UP (ref 22–31)
COLOR FLD: YELLOW — SIGNIFICANT CHANGE UP
CREAT SERPL-MCNC: 0.94 MG/DL — SIGNIFICANT CHANGE UP (ref 0.5–1.3)
CREAT SERPL-MCNC: 0.98 MG/DL — SIGNIFICANT CHANGE UP (ref 0.5–1.3)
EGFR: 89 ML/MIN/1.73M2 — SIGNIFICANT CHANGE UP
EGFR: 94 ML/MIN/1.73M2 — SIGNIFICANT CHANGE UP
EOSINOPHIL # FLD: 0 % — SIGNIFICANT CHANGE UP
FLUID INTAKE SUBSTANCE CLASS: SIGNIFICANT CHANGE UP
FOLATE+VIT B12 SERBLD-IMP: 0 % — SIGNIFICANT CHANGE UP
GLUCOSE FLD-MCNC: 119 MG/DL — SIGNIFICANT CHANGE UP
GLUCOSE SERPL-MCNC: 100 MG/DL — HIGH (ref 70–99)
GLUCOSE SERPL-MCNC: 93 MG/DL — SIGNIFICANT CHANGE UP (ref 70–99)
GRAM STN FLD: SIGNIFICANT CHANGE UP
HCT VFR BLD CALC: 31.8 % — LOW (ref 39–50)
HGB BLD-MCNC: 11 G/DL — LOW (ref 13–17)
LDH SERPL L TO P-CCNC: 72 U/L — SIGNIFICANT CHANGE UP
LYMPHOCYTES # FLD: 7 % — SIGNIFICANT CHANGE UP
MCHC RBC-ENTMCNC: 34.2 PG — HIGH (ref 27–34)
MCHC RBC-ENTMCNC: 34.6 GM/DL — SIGNIFICANT CHANGE UP (ref 32–36)
MCV RBC AUTO: 98.8 FL — SIGNIFICANT CHANGE UP (ref 80–100)
MESOTHL CELL # FLD: 5 % — SIGNIFICANT CHANGE UP
MONOS+MACROS # FLD: 17 % — SIGNIFICANT CHANGE UP
NEUTROPHILS-BODY FLUID: 71 % — SIGNIFICANT CHANGE UP
NRBC # FLD: 0 — SIGNIFICANT CHANGE UP
OTHER CELLS FLD MANUAL: 0 % — SIGNIFICANT CHANGE UP
PLATELET # BLD AUTO: 53 K/UL — LOW (ref 150–400)
POTASSIUM SERPL-MCNC: 4.1 MMOL/L — SIGNIFICANT CHANGE UP (ref 3.5–5.3)
POTASSIUM SERPL-MCNC: 4.2 MMOL/L — SIGNIFICANT CHANGE UP (ref 3.5–5.3)
POTASSIUM SERPL-SCNC: 4.1 MMOL/L — SIGNIFICANT CHANGE UP (ref 3.5–5.3)
POTASSIUM SERPL-SCNC: 4.2 MMOL/L — SIGNIFICANT CHANGE UP (ref 3.5–5.3)
PROT FLD-MCNC: <1 G/DL — SIGNIFICANT CHANGE UP
PROT SERPL-MCNC: 5.3 GM/DL — LOW (ref 6–8.3)
RBC # BLD: 3.22 M/UL — LOW (ref 4.2–5.8)
RBC # FLD: 14 % — SIGNIFICANT CHANGE UP (ref 10.3–14.5)
RCV VOL RI: 2000 /UL — HIGH (ref 0–0)
SODIUM SERPL-SCNC: 135 MMOL/L — SIGNIFICANT CHANGE UP (ref 135–145)
SODIUM SERPL-SCNC: 136 MMOL/L — SIGNIFICANT CHANGE UP (ref 135–145)
SPECIMEN SOURCE: SIGNIFICANT CHANGE UP
TOTAL NUCLEATED CELL COUNT, BODY FLUID: 1273 /UL — SIGNIFICANT CHANGE UP
TUBE TYPE: SIGNIFICANT CHANGE UP
WBC # BLD: 6.93 K/UL — SIGNIFICANT CHANGE UP (ref 3.8–10.5)
WBC # FLD AUTO: 6.93 K/UL — SIGNIFICANT CHANGE UP (ref 3.8–10.5)

## 2022-05-09 PROCEDURE — 49083 ABD PARACENTESIS W/IMAGING: CPT

## 2022-05-09 PROCEDURE — 88108 CYTOPATH CONCENTRATE TECH: CPT | Mod: 26

## 2022-05-09 PROCEDURE — 99233 SBSQ HOSP IP/OBS HIGH 50: CPT

## 2022-05-09 RX ORDER — APIXABAN 2.5 MG/1
5 TABLET, FILM COATED ORAL EVERY 12 HOURS
Refills: 0 | Status: DISCONTINUED | OUTPATIENT
Start: 2022-05-10 | End: 2022-05-11

## 2022-05-09 RX ORDER — IBUPROFEN 200 MG
400 TABLET ORAL EVERY 8 HOURS
Refills: 0 | Status: DISCONTINUED | OUTPATIENT
Start: 2022-05-09 | End: 2022-05-10

## 2022-05-09 RX ADMIN — Medication 40 MILLIGRAM(S): at 21:09

## 2022-05-09 RX ADMIN — EPLERENONE 50 MILLIGRAM(S): 50 TABLET, FILM COATED ORAL at 11:10

## 2022-05-09 RX ADMIN — Medication 400 MILLIGRAM(S): at 19:51

## 2022-05-09 RX ADMIN — CEFTRIAXONE 100 MILLIGRAM(S): 500 INJECTION, POWDER, FOR SOLUTION INTRAMUSCULAR; INTRAVENOUS at 11:10

## 2022-05-09 NOTE — PROGRESS NOTE ADULT - SUBJECTIVE AND OBJECTIVE BOX
57 y/o M PMHx of alcoholic cirrhosis, EtOH abuse (last drink was reportedly on 10/31/2021), esophageal varices, left pleural effusion, chronic partial portal vein thrombosis (on Apixaban) who was referred to the ED by his Gastroenterologist for further evaluation for worsening ascites, abdominal pain, and subjective fevers at home (Tmax 102'F). Of note the patient is s/p US guided therapeutic paracentesis by Interventional Radiology on 2022 at  with 8000cc ascites removed. Additionally the patient c/o shortness of breath despite his Furosemide dose being increased to 80mg po BID 5 days ago. Labs => PLT 65, INR 1.49, Na 134, Alb 2.7, TBili 4.8, AST 69, LA 2.2. In the ED the patient received Ceftriaxone 2g IVPB x 1, and Ibuprofen 400mg PO x 1.       22: Patient seen and examined. Abd pain better today.   22: S/P diagnostic paracentesis today. Discussed with patient regarding management plan.       Vital Signs Last 24 Hrs  T(C): 36.8 (09 May 2022 15:58), Max: 37.3 (08 May 2022 22:07)  T(F): 98.2 (09 May 2022 15:58), Max: 99.2 (08 May 2022 22:07)  HR: 82 (09 May 2022 15:58) (81 - 87)  BP: 133/65 (09 May 2022 15:58) (99/62 - 133/65)  BP(mean): --  RR: 18 (09 May 2022 15:58) (18 - 18)  SpO2: 95% (09 May 2022 15:58) (92% - 97%)        Physical Exam:       Constitutional: NAD AAOx3  	Eyes: PERRLA EOMI  	Head: Normocephalic atraumatic  	Mouth: MMM  	Cardiac: regular rate. 2+ LE edema  	Resp: Lungs CTAB  	GI: Abd s/nt, no rebound or guarding. +Distended abdomen, +Fluid wave  	Neuro: awake, alert, moving all extremities, cranial nerves 2-12 intact, sensation intact, no dysmetria.  Skin: No rashes                                    11.0   6.93  )-----------( 53       ( 09 May 2022 07:51 )             31.8     09 May 2022 15:17    135    |  103    |  24     ----------------------------<  100    4.1     |  26     |  0.94     Ca    7.9        09 May 2022 15:17  Mg     2.1       07 May 2022 22:03    TPro  5.3    /  Alb  2.2    /  TBili  1.9    /  DBili  0.9    /  AST  50     /  ALT  33     /  AlkPhos  84     09 May 2022 15:17    LIVER FUNCTIONS - ( 09 May 2022 15:17 )  Alb: 2.2 g/dL / Pro: 5.3 gm/dL / ALK PHOS: 84 U/L / ALT: 33 U/L / AST: 50 U/L / GGT: x             CAPILLARY BLOOD GLUCOSE            Urinalysis Basic - ( 08 May 2022 03:08 )    Color: Nena / Appearance: Clear / S.025 / pH: x  Gluc: x / Ketone: Small  / Bili: Small / Urobili: Negative   Blood: x / Protein: 30 mg/dL / Nitrite: Negative   Leuk Esterase: Trace / RBC: 3-5 /HPF / WBC 3-5   Sq Epi: x / Non Sq Epi: Few / Bacteria: Few        MEDICATIONS  (STANDING):  cefTRIAXone   IVPB 2000 milliGRAM(s) IV Intermittent every 24 hours  eplerenone 50 milliGRAM(s) Oral daily  furosemide   Injectable 40 milliGRAM(s) IV Push two times a day    MEDICATIONS  (PRN):  ondansetron Injectable 4 milliGRAM(s) IV Push every 8 hours PRN Nausea and/or Vomiting              Assessment:  · Assessment	  57 y/o M PMHx of alcoholic cirrhosis, EtOH abuse (last drink was reportedly on 10/31/2021), esophageal varices, left pleural effusion, chronic partial portal vein thrombosis (on Apixaban) who was referred to the [facility] by his Gastroenterologist for further evaluation for worsening ascites, abdominal pain, and subjective fevers at home (Tmax 102'F). Of note the patient is s/p US guided therapeutic paracentesis by Interventional Radiology on 2022 at  with 8000cc ascites removed. Additionally the patient c/o shortness of breath despite his Furosemide dose being increased to 80mg po BID 5 days ago. Labs => PLT 65, INR 1.49, Na 134, Alb 2.7, TBili 4.8, AST 69, LA 2.2. In the ED the patient received Ceftriaxone 2g IVPB x 1, and Ibuprofen 400mg PO x 1.     #Large Ascites due to Decompensated Alcoholic Cirrhosis with Probable Spontaneous Bacterial Peritonitis  #Small Left Pleural Effusion  #Transaminitis  #Thrombocytopenia    ~Blood cultures: no growth  ~cont. Ceftriaxone 2g IVPB daily   ~ ID consultation appreciated  ~Dr Bea consult appreciated  ~f/u ascitic fluid analysis including culture   ~cont. Eplerenone 50mg po daily  ~cont. Lasix 40mg IVP bid (takes 80mg po bid at home)  ~strict I/Os  ~daily weights  -If no SBP will need large volume therapeutic paracentesis as per Dr Bae    #Portal vein thrombosis  ~Restart eliquis     #Vte ppx  ~Restart  eliquis

## 2022-05-09 NOTE — PROGRESS NOTE ADULT - ASSESSMENT
57 y/o Male with h/o alcoholic cirrhosis, esophageal varices, left pleural effusion, chronic partial portal vein thrombosis (on Apixaban) was admitted on 5/7 after he was referred to the [facility] by his Gastroenterologist for further evaluation for worsening ascites, abdominal pain, and subjective fevers at home (Tmax 102'F). Of note the patient is s/p US guided therapeutic paracentesis by Interventional Radiology on 4/29/2022 at  with 8000cc ascites removed. Additionally the patient has shortness of breath despite his Furosemide dose being increased to 80mg po BID 5 days ago. In ER he received ceftriaxone and vancomycin IV.     1. Febrile syndrome. Alcoholic cirrhosis. Probable SBP s/p paracentesis.   -has abdominal discomfort  -BC x 2 noted  -on ceftriaxone 2 gm IV qd # 2  -tolerating abx well so far; no side effects noted  -GI evaluation   -s/p paracentesis - fluid was sent for analysis and culture  -monitor abdomen  -continue abx coverage   -monitor temps  -f/u CBC  -supportive care  2. Other issues:   -care per medicine

## 2022-05-09 NOTE — PROGRESS NOTE ADULT - ASSESSMENT
56 year old man with decompensated ETOH cirrhosis (last drink 7 months ago), PVT on apixaban, admitted for abdominal pain and fevers.     Awaiting cultures for ?bacterial peritonitis with recent instrumentation  Monitor weight daily  Monitor vitals   Monitor LFT and CBC  Diagnostic paracentesis   continue ceftriaxone  continue eplerenone 50 mg daily  continue Lasix 40 IV BID   no LFTs from today  spoke with RN will get STAT 56 year old man with decompensated ETOH cirrhosis (last drink 7 months ago), PVT on apixaban, admitted for abdominal pain and fevers.   s/p US guided paracentesis today  - aspirated 50 cc ascites with c/o abdominal discomfort and distention     Febrile syndrome with  Alcoholic cirrhosis. Probable SBP s/p paracentesis fluid sent for analysis and culture  -BC x 2 NGTD  -on ceftriaxone 2 gm IV qd # 2  -monitor abdomen  -continue abx coverage as per ID   -monitor temps  -monitor weight daily  -monitor LFT and CBC  -continue eplerenone 50 mg daily  -continue Lasix 40 IV BID   - no LFTs from today  spoke with RN will get STAT   - low sodium diet  56 year old man with decompensated ETOH cirrhosis (last drink 7 months ago), PVT on apixaban, admitted for abdominal pain and fevers.   s/p US guided paracentesis today  - aspirated 50 cc ascites with c/o abdominal discomfort and distention     Febrile syndrome with  Alcoholic cirrhosis. Probable SBP s/p paracentesis fluid sent for analysis and culture  -BC x 2 NGTD  -on ceftriaxone 2 gm IV qd # 2  -monitor abdomen  -continue abx coverage as per ID   -monitor temps  -monitor weight daily  -monitor LFT and CBC  -continue eplerenone 50 mg daily  -continue Lasix 40 IV BID   - LFTs not done today  - ordered    - low sodium diet  56 year old man with decompensated ETOH cirrhosis (last drink 7 months ago), PVT on apixaban, admitted for abdominal pain and fevers.   s/p US guided paracentesis today  - aspirated 50 cc ascites with c/o abdominal discomfort and distention     Febrile syndrome with  Alcoholic cirrhosis. Probable SBP s/p paracentesis fluid sent for analysis and culture  -BC x 2 NGTD  -on ceftriaxone 2 gm IV qd # 2  -monitor abdomen  -continue abx coverage as per ID   -monitor temps  -monitor weight daily  -monitor LFT and CBC  -continue eplerenone 50 mg daily  -continue Lasix 40 IV BID   - LFTs not done today  - ordered STAT    - low sodium diet

## 2022-05-09 NOTE — PROGRESS NOTE ADULT - SUBJECTIVE AND OBJECTIVE BOX
Patient is a 56y old  Male who presents with a chief complaint of Abdominal Pain, Subjective Fevers, Weight Loss (08 May 2022 00:33)    56 year old man with decompensated ETOH cirrhosis (last drink 7 months ago), PVT on apixaban, admitted for abdominal pain and fevers.     Patient states he developed worsening abdominal discomfort over the past week due to his sevre reaccumulation of fluid. Discomfort is diffuse, 4/10, non radiating, dull/pressure like. No alleviating or known exacerbating factors (other than the fluid). Had a large volume paracentesis 10 days ago (8 liters off) and despite being compliant on his diuretics (max dose), still rapidly accumulated volume. He was dealing with this but yesterday developed fevers at home, documented at 102. This made him come to ED. Denies any sick contacts or travel history. No diarrhea. No chills. No overt signs of GI bleeding like hematemesis, hematochezia, melena.     Pt is lying in bed feels ok, but still has discomfort in his abdomen and back. Denies nausea, vomiting, regurgitation, diarrhea, constipation, melena or BRBPR      PAST MEDICAL & SURGICAL HISTORY:  ETOH abuse (sober 7 months)    Alcoholic fatty liver    Thrombocytopenia concurrent with and due to alcoholism    Cirrhosis of liver with ascites    S/P abdominal paracentesis    History of tonsillectomy and adenoidectomy  as child    MEDICATIONS  (STANDING):  cefTRIAXone   IVPB 2000 milliGRAM(s) IV Intermittent every 24 hours  eplerenone 50 milliGRAM(s) Oral daily  furosemide    Tablet 80 milliGRAM(s) Oral two times a day  furosemide   Injectable 40 milliGRAM(s) IV Push two times a day  vancomycin  IVPB 1750 milliGRAM(s) IV Intermittent every 12 hours    MEDICATIONS  (PRN):  ondansetron Injectable 4 milliGRAM(s) IV Push every 8 hours PRN Nausea and/or Vomiting      Allergies    No Known Allergies      SOCIAL HISTORY:  no current drinking, sober x 7 months, no smoking, no drugs    FAMILY HISTORY:  No pertinent family history in first degree relatives  no colon or stomach cancer    REVIEW OF SYSTEMS:    CONSTITUTIONAL: No weakness, +fevers no chills  EYES/ENT: No visual changes;  No vertigo or throat pain   NECK: No pain or stiffness  RESPIRATORY: No cough, wheezing, hemoptysis; No shortness of breath  CARDIOVASCULAR: No chest pain or palpitations  GASTROINTESTINAL: see HPI  GENITOURINARY: No dysuria, frequency or hematuria  NEUROLOGICAL: No numbness or weakness  SKIN: No itching, burning, rashes, or lesions   PSYCH: Normal mood and affect  All other review of systems is negative unless indicated above.    Vital Signs Last 24 Hrs  T(C): 37.3 (08 May 2022 07:49), Max: 37.8 (08 May 2022 00:37)  T(F): 99.1 (08 May 2022 07:49), Max: 100 (08 May 2022 00:37)  HR: 78 (08 May 2022 07:49) (78 - 96)  BP: 98/46 (08 May 2022 07:49) (98/46 - 119/70)  BP(mean): 82 (07 May 2022 23:36) (82 - 87)  RR: 18 (08 May 2022 07:49) (18 - 19)  SpO2: 95% (08 May 2022 07:49) (94% - 100%)    PHYSICAL EXAM:    Constitutional: No acute distress, non-toxic appearing, pleasant  HEENT: un-masked, good phonation, not icteric  Neck: supple, no lymphadenopathy  Respiratory: clear to ascultation bilaterally, no wheezing  Cardiovascular: S1 and S2, regular rate and rhythm, no murmurs rubs or gallops  Gastrointestinal: soft, tender to deep palpation, + distended (ascites), +bowel sounds, no rebound or guarding, no drains  Extremities: No peripheral edema, no cyanosis or clubbing  Vascular: 2+ peripheral pulses, no venous stasis  Neurological: A/O x 3, no focal deficits, no asterixis  Psychiatric: Normal mood, normal affect  Skin: No rashes, not jaundiced    LABS:                                      11.0   6.93  )-----------( 53       ( 09 May 2022 07:51 )             31.8     05-09    136  |  104  |  25<H>  ----------------------------<  93  4.2   |  26  |  0.98    Ca    7.8<L>      09 May 2022 07:51  Mg     2.1     05-07    TPro  4.8<L>  /  Alb  2.0<L>  /  TBili  4.5<H>  /  DBili  x   /  AST  55<H>  /  ALT  32  /  AlkPhos  76  05-08      LIVER FUNCTIONS - ( 08 May 2022 08:05 )  Alb: 2.0 g/dL / Pro: 4.8 gm/dL / ALK PHOS: 76 U/L / ALT: 32 U/L / AST: 55 U/L / GGT: x                     Patient is a 56y old  Male who presents with a chief complaint of Abdominal Pain, Subjective Fevers, Weight Loss (08 May 2022 00:33)    56 year old man with decompensated ETOH cirrhosis (last drink 7 months ago), PVT on apixaban, admitted for abdominal pain and fevers.     Patient states he developed worsening abdominal discomfort over the past week due to his sevre reaccumulation of fluid. Discomfort is diffuse, 4/10, non radiating, dull/pressure like. No alleviating or known exacerbating factors (other than the fluid). Had a large volume paracentesis 10 days ago (8 liters off) and despite being compliant on his diuretics (max dose), still rapidly accumulated volume. He was dealing with this but yesterday developed fevers at home, documented at 102. This made him come to ED. Denies any sick contacts or travel history. No diarrhea. No chills. No overt signs of GI bleeding like hematemesis, hematochezia, melena.     Pt is lying in bed feels ok, but c/o discomfort in his abdomen with distention and back pain. Denies nausea, vomiting, regurgitation, diarrhea, constipation, melena or BRBPR  s/p US guided paracentesis today       PAST MEDICAL & SURGICAL HISTORY:  ETOH abuse (sober 7 months)    Alcoholic fatty liver    Thrombocytopenia concurrent with and due to alcoholism    Cirrhosis of liver with ascites    S/P abdominal paracentesis    History of tonsillectomy and adenoidectomy  as child    MEDICATIONS  (STANDING):  cefTRIAXone   IVPB 2000 milliGRAM(s) IV Intermittent every 24 hours  eplerenone 50 milliGRAM(s) Oral daily  furosemide    Tablet 80 milliGRAM(s) Oral two times a day  furosemide   Injectable 40 milliGRAM(s) IV Push two times a day  vancomycin  IVPB 1750 milliGRAM(s) IV Intermittent every 12 hours    MEDICATIONS  (PRN):  ondansetron Injectable 4 milliGRAM(s) IV Push every 8 hours PRN Nausea and/or Vomiting      Allergies    No Known Allergies      SOCIAL HISTORY:  no current drinking, sober x 7 months, no smoking, no drugs    FAMILY HISTORY:  No pertinent family history in first degree relatives  no colon or stomach cancer    REVIEW OF SYSTEMS:    CONSTITUTIONAL: No weakness, +fevers no chills  EYES/ENT: No visual changes;  No vertigo or throat pain   NECK: No pain or stiffness  RESPIRATORY: No cough, wheezing, hemoptysis; No shortness of breath  CARDIOVASCULAR: No chest pain or palpitations  GASTROINTESTINAL: see HPI  GENITOURINARY: No dysuria, frequency or hematuria  NEUROLOGICAL: No numbness or weakness  SKIN: No itching, burning, rashes, or lesions   PSYCH: Normal mood and affect  All other review of systems is negative unless indicated above.    Vital Signs Last 24 Hrs  T(C): 37 (09 May 2022 07:44), Max: 37.3 (08 May 2022 22:07)  T(F): 98.6 (09 May 2022 07:44), Max: 99.2 (08 May 2022 22:07)  HR: 85 (09 May 2022 07:44) (81 - 87)  BP: 117/59 (09 May 2022 07:44) (99/62 - 117/59)  BP(mean): --  RR: 18 (09 May 2022 07:44) (18 - 18)  SpO2: 92% (09 May 2022 07:44) (92% - 97%)    PHYSICAL EXAM:    Constitutional: No acute distress, non-toxic appearing, pleasant  HEENT: un-masked, good phonation, not icteric  Neck: supple   Respiratory: clear to ascultation bilaterally, no wheezing  Cardiovascular: S1 and S2, regular rate and rhythm, no murmurs rubs or gallops  Gastrointestinal: soft, tender to deep palpation, + distended (ascites), +bowel sounds, no rebound or guarding, no drains  Extremities: bilateral LE edema right > left with sig hyperpigmentation noted   Neurological: A/O x 3, no focal deficits, no asterixis  Psychiatric: Normal mood, normal affect  Skin: No rashes, not jaundiced    LABS:                                    11.0   6.93  )-----------( 53       ( 09 May 2022 07:51 )             31.8     05-09    136  |  104  |  25<H>  ----------------------------<  93  4.2   |  26  |  0.98    Ca    7.8<L>      09 May 2022 07:51  Mg     2.1     05-07    TPro  4.8<L>  /  Alb  2.0<L>  /  TBili  4.5<H>  /  DBili  x   /  AST  55<H>  /  ALT  32  /  AlkPhos  76  05-08      LIVER FUNCTIONS - ( 08 May 2022 08:05 )  Alb: 2.0 g/dL / Pro: 4.8 gm/dL / ALK PHOS: 76 U/L / ALT: 32 U/L / AST: 55 U/L / GGT: x

## 2022-05-09 NOTE — PROGRESS NOTE ADULT - SUBJECTIVE AND OBJECTIVE BOX
Date of service: 22 @ 13:41    Sitting in bed in NAD  s/p paracentesis today  Has abdominal discomfort  Has low grade fever    ROS: no fever or chills; denies dizziness, no HA, no SOB or cough, no dysuria, no legs pain, no rashes    MEDICATIONS  (STANDING):  cefTRIAXone   IVPB 2000 milliGRAM(s) IV Intermittent every 24 hours  eplerenone 50 milliGRAM(s) Oral daily  furosemide   Injectable 40 milliGRAM(s) IV Push two times a day    Vital Signs Last 24 Hrs  T(C): 37 (09 May 2022 07:44), Max: 37.3 (08 May 2022 22:07)  T(F): 98.6 (09 May 2022 07:44), Max: 99.2 (08 May 2022 22:07)  HR: 85 (09 May 2022 07:44) (81 - 87)  BP: 117/59 (09 May 2022 07:44) (99/62 - 117/59)  BP(mean): --  RR: 18 (09 May 2022 07:44) (18 - 18)  SpO2: 92% (09 May 2022 07:44) (92% - 97%)     Physical exam:    Constitutional:  No acute distress  HEENT: NC/AT, EOMI, PERRLA, conjunctivae clear; ears and nose atraumatic  Neck: supple; thyroid not palpable  Back: no tenderness  Respiratory: respiratory effort normal; clear to auscultation  Cardiovascular: S1S2 regular, no murmurs  Abdomen: soft, mild periombilical tender, distended, positive BS  Genitourinary: no suprapubic tenderness  Lymphatic: no LN palpable  Musculoskeletal: no muscle tenderness, no joint swelling or tenderness  Extremities: no pedal edema  Neurological/ Psychiatric: AxOx3, moving all extremities  Skin: no rashes; no palpable lesions    Labs: all available labs reviewed                        11.4   8.79  )-----------( 53       ( 08 May 2022 08:05 )             33.4     05-08    134<L>  |  103  |  23  ----------------------------<  116<H>  4.7   |  26  |  1.24    Ca    8.4<L>      08 May 2022 08:05  Mg     2.1     05-07    TPro  4.8<L>  /  Alb  2.0<L>  /  TBili  4.5<H>  /  DBili  x   /  AST  55<H>  /  ALT  32  /  AlkPhos  76       LIVER FUNCTIONS - ( 08 May 2022 08:05 )  Alb: 2.0 g/dL / Pro: 4.8 gm/dL / ALK PHOS: 76 U/L / ALT: 32 U/L / AST: 55 U/L / GGT: x           Urinalysis Basic - ( 08 May 2022 03:08 )    Color: Nena / Appearance: Clear / S.025 / pH: x  Gluc: x / Ketone: Small  / Bili: Small / Urobili: Negative   Blood: x / Protein: 30 mg/dL / Nitrite: Negative   Leuk Esterase: Trace / RBC: 3-5 /HPF / WBC 3-5   Sq Epi: x / Non Sq Epi: Few / Bacteria: Few      Culture - Blood (collected 07 May 2022 22:03)  Source: .Blood None  Preliminary Report (09 May 2022 07:02):    No growth to date.    Culture - Blood (collected 07 May 2022 22:03)  Source: .Blood None  Preliminary Report (09 May 2022 07:02):    No growth to date.    Radiology: all available radiological tests reviewed        Advanced directives addressed: full resuscitation

## 2022-05-09 NOTE — SBIRT NOTE ADULT - NSSBIRTALCPOSREINDET_GEN_A_CORE
Pt has a PMHx of ETOH abuse, however, reports not having had a drink since 10/2021. Positive reinforcement provided.

## 2022-05-10 LAB
ANION GAP SERPL CALC-SCNC: 5 MMOL/L — SIGNIFICANT CHANGE UP (ref 5–17)
BUN SERPL-MCNC: 22 MG/DL — SIGNIFICANT CHANGE UP (ref 7–23)
CALCIUM SERPL-MCNC: 8 MG/DL — LOW (ref 8.5–10.1)
CHLORIDE SERPL-SCNC: 103 MMOL/L — SIGNIFICANT CHANGE UP (ref 96–108)
CO2 SERPL-SCNC: 29 MMOL/L — SIGNIFICANT CHANGE UP (ref 22–31)
CREAT SERPL-MCNC: 0.82 MG/DL — SIGNIFICANT CHANGE UP (ref 0.5–1.3)
EGFR: 102 ML/MIN/1.73M2 — SIGNIFICANT CHANGE UP
GLUCOSE SERPL-MCNC: 93 MG/DL — SIGNIFICANT CHANGE UP (ref 70–99)
HCT VFR BLD CALC: 34.4 % — LOW (ref 39–50)
HGB BLD-MCNC: 11.5 G/DL — LOW (ref 13–17)
MCHC RBC-ENTMCNC: 33.4 GM/DL — SIGNIFICANT CHANGE UP (ref 32–36)
MCHC RBC-ENTMCNC: 33.6 PG — SIGNIFICANT CHANGE UP (ref 27–34)
MCV RBC AUTO: 100.6 FL — HIGH (ref 80–100)
NIGHT BLUE STAIN TISS: SIGNIFICANT CHANGE UP
PLATELET # BLD AUTO: 58 K/UL — LOW (ref 150–400)
POTASSIUM SERPL-MCNC: 4.4 MMOL/L — SIGNIFICANT CHANGE UP (ref 3.5–5.3)
POTASSIUM SERPL-SCNC: 4.4 MMOL/L — SIGNIFICANT CHANGE UP (ref 3.5–5.3)
RBC # BLD: 3.42 M/UL — LOW (ref 4.2–5.8)
RBC # FLD: 13.8 % — SIGNIFICANT CHANGE UP (ref 10.3–14.5)
SODIUM SERPL-SCNC: 137 MMOL/L — SIGNIFICANT CHANGE UP (ref 135–145)
SPECIMEN SOURCE: SIGNIFICANT CHANGE UP
WBC # BLD: 4 K/UL — SIGNIFICANT CHANGE UP (ref 3.8–10.5)
WBC # FLD AUTO: 4 K/UL — SIGNIFICANT CHANGE UP (ref 3.8–10.5)

## 2022-05-10 PROCEDURE — 99232 SBSQ HOSP IP/OBS MODERATE 35: CPT

## 2022-05-10 RX ADMIN — EPLERENONE 50 MILLIGRAM(S): 50 TABLET, FILM COATED ORAL at 09:23

## 2022-05-10 RX ADMIN — Medication 40 MILLIGRAM(S): at 09:22

## 2022-05-10 RX ADMIN — CEFTRIAXONE 100 MILLIGRAM(S): 500 INJECTION, POWDER, FOR SOLUTION INTRAMUSCULAR; INTRAVENOUS at 09:22

## 2022-05-10 RX ADMIN — APIXABAN 5 MILLIGRAM(S): 2.5 TABLET, FILM COATED ORAL at 09:23

## 2022-05-10 NOTE — CDI QUERY NOTE - NSCDIOTHERTXTBX2_GEN_ALL_CORE_FT
Clarification clinical diagnosis regarding  CXR findings     The Physician's or Provider's documentation of the patient's presentation, evaluation and medical management, as identified below, may support a diagnosis that is not documented to the furthest specificity in the medical record. Please clarify in your progress notes and/or discharge summary if there is a corresponding diagnosis associated with the clinical information described below:    A. Pneumonia  B. Other please specify   C. Clinically not significative  D. Unable to determine     SUPPORTING DOCUMENTATION AND/OR CLINICAL EVIDENCE: IMPRESSION: Left lower lobe pneumonia    CXR 5/7 < from: Xray Chest 1 View- PORTABLE-Urgent (Xray Chest 1 View- PORTABLE-Urgent .) (05.07.22 @ 22:09) >  PROCEDURE DATE:  05/07/2022    INTERPRETATION:  Fever.  Low lung volumes. Normal heart mediastinum. Airspace infiltrate left   lower lobe consistent with pneumonia. No pleural effusion.  IMPRESSION: Left lower lobe pneumonia    H&P · Respiratory Details respirations non-labored; diminished breath sounds, L    #Small Left Pleural Effusion    MEDICATIONS  (STANDING):  cefTRIAXone   IVPB 2000 milliGRAM(s) IV Intermittent every 24 hours  eplerenone 50 milliGRAM(s) Oral daily  furosemide   Injectable 40 milliGRAM(s) IV Push two times a day  vancomycin  IVPB 1750 milliGRAM(s) IV Intermittent every 12 hours

## 2022-05-10 NOTE — CDI QUERY NOTE - NSCDIOTHERTXTBX_GEN_ALL_CORE_HH
Could you Please document the relationship between the following conditions: Spontaneous Bacterial Peritonitis and recent procedure/paracenthesis(4/29)    A. Spontaneous Bacterial peritonitis associated with recent paracentesis   B. Spontaneous Bacterial peritonitis not  associated with recent paracentesis   C. Other please specify  D. Unable to determine    SUPPORTING DOCUMENTATION AND/OR CLINICAL EVIDENCE: Pan culture patient, although concern is for bacterial peritonitis considering recent instrumentation.     ID PN 5/10 fevers at home (Tmax 102'F). Of note the patient is s/p US guided therapeutic paracentesis by Interventional Radiology on 4/29/2022 at  with 8000cc ascites removed. Additionally the patient has shortness of breath despite his Furosemide dose being increased to 80mg po BID 5 days ago. In ER he received ceftriaxone and vancomycin IV.       Medicine PN 5/9- #Large Ascites due to Decompensated Alcoholic Cirrhosis with Probable Spontaneous Bacterial Peritonitis  #Small Left Pleural Effusion#Transaminitis#Thrombocytopenia      Operative Findings:· Operative FindingsSuccessful aspiration of only 50cc ascites for diagnostics as requested by team  Specimens/Blood Loss/IV/Output/Protocol/VTE: Specimens/Blood Loss/IV/Output/Protocol/VTE:· SpecimensSent  Electronic Signatures:Vinny Mariano (PA)  (Signed 09-May-2022 12:45)  	  CXR < from: Xray Chest 1 View- PORTABLE-Urgent (Xray Chest 1 View- PORTABLE-Urgent .) (05.07.22 @ 22:09) >    Low lung volumes. Normal heart mediastinum. Airspace infiltrate left   lower lobe consistent with pneumonia. No pleural effusion.  IMPRESSION: Left lower lobe pneumonia

## 2022-05-10 NOTE — PROGRESS NOTE ADULT - ASSESSMENT
56 year old man with decompensated ETOH cirrhosis (last drink 7 months ago), PVT on apixaban, admitted for abdominal pain and fevers.   s/p US guided paracentesis confirming SBP.    -cont on ceftriaxone 2 gm IV daily  -f/u ascites cx  -if Cr stable in AM would send for LVP and give albumin replacement  -hold diuretics today and tomorrow  -monitor LFT and CBC  -should be on 2 g sodium diet  -will likely need TIPS however with PV thombus this may not be technically possible  -continue anticoag and he is pending MRI abdomen later this week as outpt to reassess PV thrombus.   -d/w pt in detail

## 2022-05-10 NOTE — PROGRESS NOTE ADULT - SUBJECTIVE AND OBJECTIVE BOX
GI    56 year old man with decompensated ETOH cirrhosis (last drink 7 months ago), PVT on apixaban, admitted for abdominal pain and fevers.     dx paracentesis confirms SBP  no n/v  remains with some abd pain but less  remains quite distended  abiola po     REVIEW OF SYSTEMS:    CONSTITUTIONAL: No weakness, +fevers no chills  EYES/ENT: No visual changes;  No vertigo or throat pain   NECK: No pain or stiffness  RESPIRATORY: No cough, wheezing, hemoptysis; No shortness of breath  CARDIOVASCULAR: No chest pain or palpitations  GASTROINTESTINAL: see HPI  GENITOURINARY: No dysuria, frequency or hematuria  NEUROLOGICAL: No numbness or weakness  SKIN: No itching, burning, rashes, or lesions   PSYCH: Normal mood and affect  All other review of systems is negative unless indicated above.      PHYSICAL EXAM:  Vital Signs Last 24 Hrs  T(C): 36.4 (10 May 2022 15:56), Max: 37.7 (09 May 2022 21:06)  T(F): 97.6 (10 May 2022 15:56), Max: 99.8 (09 May 2022 21:06)  HR: 74 (10 May 2022 15:56) (74 - 85)  BP: 127/74 (10 May 2022 15:56) (111/69 - 127/74)  BP(mean): --  RR: 18 (10 May 2022 15:56) (18 - 18)  SpO2: 100% (10 May 2022 15:56) (94% - 100%)  Constitutional: No acute distress, non-toxic appearing, pleasant  HEENT: un-masked, good phonation, not icteric  Neck: supple   Respiratory: clear to ascultation bilaterally, no wheezing  Cardiovascular: S1 and S2, regular rate and rhythm, no murmurs rubs or gallops  Gastrointestinal: soft, tender to deep palpation, + distended (ascites), +bowel sounds, no rebound or guarding, no drains  Extremities: bilateral LE edema right > left with sig hyperpigmentation noted   Neurological: A/O x 3, no focal deficits, no asterixis  Psychiatric: Normal mood, normal affect  Skin: No rashes, not jaundiced    LABS:                                                   11.5   4.00  )-----------( 56       ( 10 May 2022 08:03 )             34.4     05-10    137  |  103  |  22  ----------------------------<  93  4.4   |  29  |  0.82    Ca    8.0<L>      10 May 2022 08:03    TPro  5.3<L>  /  Alb  2.2<L>  /  TBili  1.9<H>  /  DBili  0.9<H>  /  AST  50<H>  /  ALT  33  /  AlkPhos  84  05-09    ascites SAAG high (portal HTN) and +SBP, Gm stain neg, Cx pending

## 2022-05-10 NOTE — PROGRESS NOTE ADULT - ASSESSMENT
55 y/o Male with h/o alcoholic cirrhosis, esophageal varices, left pleural effusion, chronic partial portal vein thrombosis (on Apixaban) was admitted on 5/7 after he was referred to the [facility] by his Gastroenterologist for further evaluation for worsening ascites, abdominal pain, and subjective fevers at home (Tmax 102'F). Of note the patient is s/p US guided therapeutic paracentesis by Interventional Radiology on 4/29/2022 at  with 8000cc ascites removed. Additionally the patient has shortness of breath despite his Furosemide dose being increased to 80mg po BID 5 days ago. In ER he received ceftriaxone and vancomycin IV.     1. Febrile syndrome. Alcoholic cirrhosis. Probable SBP s/p paracentesis.   -COVID exposure - place on droplet isolation - monitor for respiratory symptoms  -has abdominal discomfort  -BC x 2 noted  -on ceftriaxone 2 gm IV qd # 3  -tolerating abx well so far; no side effects noted  -GI evaluation appreciated  -s/p paracentesis - fluid analysis and culture reviewed  -monitor abdomen  -continue abx coverage   -monitor temps  -f/u CBC  -supportive care  2. Other issues:   -care per medicine

## 2022-05-10 NOTE — PROGRESS NOTE ADULT - SUBJECTIVE AND OBJECTIVE BOX
CC: Abd distention, fever  · Subjective and Objective:   57 y/o M PMHx of alcoholic cirrhosis, EtOH abuse (last drink was reportedly on 10/31/2021), esophageal varices, left pleural effusion, chronic partial portal vein thrombosis (on Apixaban) who was referred to the ED by his Gastroenterologist for further evaluation for worsening ascites, abdominal pain, and subjective fevers at home (Tmax 102'F). Of note the patient is s/p US guided therapeutic paracentesis by Interventional Radiology on 4/29/2022 at  with 8000cc ascites removed. Additionally the patient c/o shortness of breath despite his Furosemide dose being increased to 80mg po BID 5 days ago. Labs => PLT 65, INR 1.49, Na 134, Alb 2.7, TBili 4.8, AST 69, LA 2.2. In the ED the patient received Ceftriaxone 2g IVPB x 1, and Ibuprofen 400mg PO x 1.       05/08/22: Patient seen and examined. Abd pain better today.   05/09/22: S/P diagnostic paracentesis today. Discussed with patient regarding management plan.   5/10: In bed, alert, abd distended, no fevers.  Updated pt on plan of care.    REVIEW OF SYSTEMS: All other review of systems is negative unless indicated above.    Vital Signs Last 24 Hrs  T(C): 36.3 (10 May 2022 09:37), Max: 37.7 (09 May 2022 21:06)  T(F): 97.3 (10 May 2022 09:37), Max: 99.8 (09 May 2022 21:06)  HR: 75 (10 May 2022 09:37) (75 - 85)  BP: 111/69 (10 May 2022 09:37) (111/69 - 133/65)  BP(mean): --  RR: 18 (10 May 2022 09:37) (18 - 18)  SpO2: 97% (10 May 2022 09:37) (94% - 97%)    PHYSICAL EXAM:    Constitutional: NAD, awake and alert, well-developed  HEENT: PERR, EOMI, Normal Hearing, MMM  Neck: Soft and supple  Respiratory: Breath sounds are clear bilaterally, No wheezing, rales or rhonchi  Cardiovascular: S1 and S2, regular rate and rhythm, no Murmurs, gallops or rubs  Gastrointestinal: Bowel Sounds present, soft, nontender, large distended abd, no guarding, no rebound  Extremities: No peripheral edema  Neurological: A/O x 3, no focal deficits in my limited exam        MEDICATIONS  (STANDING):  apixaban 5 milliGRAM(s) Oral every 12 hours  cefTRIAXone   IVPB 2000 milliGRAM(s) IV Intermittent every 24 hours  eplerenone 50 milliGRAM(s) Oral daily  furosemide   Injectable 40 milliGRAM(s) IV Push two times a day    MEDICATIONS  (PRN):  ondansetron Injectable 4 milliGRAM(s) IV Push every 8 hours PRN Nausea and/or Vomiting                                11.5   4.00  )-----------( 56       ( 10 May 2022 08:03 )             34.4     05-10    137  |  103  |  22  ----------------------------<  93  4.4   |  29  |  0.82    Ca    8.0<L>      10 May 2022 08:03    TPro  5.3<L>  /  Alb  2.2<L>  /  TBili  1.9<H>  /  DBili  0.9<H>  /  AST  50<H>  /  ALT  33  /  AlkPhos  84  05-09    CAPILLARY BLOOD GLUCOSE        LIVER FUNCTIONS - ( 09 May 2022 15:17 )  Alb: 2.2 g/dL / Pro: 5.3 gm/dL / ALK PHOS: 84 U/L / ALT: 33 U/L / AST: 50 U/L / GGT: x                     Assessment/Plan: 57 y/o M PMHx of alcoholic cirrhosis, EtOH abuse (last drink was reportedly on 10/31/2021), esophageal varices, left pleural effusion, chronic partial portal vein thrombosis (on Apixaban) who was referred to the [facility] by his Gastroenterologist for further evaluation for worsening ascites, abdominal pain, and subjective fevers at home (Tmax 102'F).     #Large Ascites due to Decompensated Alcoholic Cirrhosis with Probable Spontaneous Bacterial Peritonitis:  - unable to determine if Spontaneous Bacterial Peritonitis related to recent paracentesis  - on IV lasix and oral eplerenone, received in AM, hold further for possible paracentesis.   - c/w IV ceftriaxone for probable SBP per ID  - f/u body fluid cultures  ~Blood cultures: no growth  - GI following      #Transaminitis / Thrombocytopenia / hyperbilirubinemia: Due to above  - trending down, monitor      #Portal vein thrombosis  - eliquis       #Vte ppx  - eliquis      Assessment and Plan:   Nutritional Assessment:  · Nutritional Assessment	This patient has been assessed with a concern for Malnutrition and has been determined to have a diagnosis/diagnoses of Moderate protein-calorie malnutrition.    This patient is being managed with:   Diet Regular-  Entered: May  8 2022 12:16PM

## 2022-05-10 NOTE — PROGRESS NOTE ADULT - SUBJECTIVE AND OBJECTIVE BOX
Date of service: 05-10-22 @ 11:13    Lying in bed in NAD  Weak looking  Abdomen feel less tender  Has abdominal distention  COVID vaccinated and boosted    ROS: no fever or chills; denies dizziness, no HA, no SOB or cough, no diarrhea or constipation; no dysuria, no legs pain, no rashes    MEDICATIONS  (STANDING):  apixaban 5 milliGRAM(s) Oral every 12 hours  cefTRIAXone   IVPB 2000 milliGRAM(s) IV Intermittent every 24 hours  eplerenone 50 milliGRAM(s) Oral daily  furosemide   Injectable 40 milliGRAM(s) IV Push two times a day    Vital Signs Last 24 Hrs  T(C): 36.3 (10 May 2022 09:37), Max: 37.7 (09 May 2022 21:06)  T(F): 97.3 (10 May 2022 09:37), Max: 99.8 (09 May 2022 21:06)  HR: 75 (10 May 2022 09:37) (75 - 85)  BP: 111/69 (10 May 2022 09:37) (111/69 - 133/65)  BP(mean): --  RR: 18 (10 May 2022 09:37) (18 - 18)  SpO2: 97% (10 May 2022 09:37) (94% - 97%)     Physical exam:    Constitutional:  No acute distress  HEENT: NC/AT, EOMI, PERRLA, conjunctivae clear; ears and nose atraumatic  Neck: supple; thyroid not palpable  Back: no tenderness  Respiratory: respiratory effort normal; clear to auscultation  Cardiovascular: S1S2 regular, no murmurs  Abdomen: soft, mild periombilical tender, distended, positive BS  Genitourinary: no suprapubic tenderness  Lymphatic: no LN palpable  Musculoskeletal: no muscle tenderness, no joint swelling or tenderness  Extremities: no pedal edema  Neurological/ Psychiatric: AxOx3, moving all extremities  Skin: no rashes; no palpable lesions    Labs: all available labs reviewed                        11.4   8.79  )-----------( 53       ( 08 May 2022 08:05 )             33.4     05-08    134<L>  |  103  |  23  ----------------------------<  116<H>  4.7   |  26  |  1.24    Ca    8.4<L>      08 May 2022 08:05  Mg     2.1     05-07    TPro  4.8<L>  /  Alb  2.0<L>  /  TBili  4.5<H>  /  DBili  x   /  AST  55<H>  /  ALT  32  /  AlkPhos  76  05-08     LIVER FUNCTIONS - ( 08 May 2022 08:05 )  Alb: 2.0 g/dL / Pro: 4.8 gm/dL / ALK PHOS: 76 U/L / ALT: 32 U/L / AST: 55 U/L / GGT: x           Urinalysis Basic - ( 08 May 2022 03:08 )    Color: Nena / Appearance: Clear / S.025 / pH: x  Gluc: x / Ketone: Small  / Bili: Small / Urobili: Negative   Blood: x / Protein: 30 mg/dL / Nitrite: Negative   Leuk Esterase: Trace / RBC: 3-5 /HPF / WBC 3-5   Sq Epi: x / Non Sq Epi: Few / Bacteria: Few    Total Nucleated Cell Count, Body Fluid: 1273    Culture - Blood (collected 07 May 2022 22:03)  Source: .Blood None  Preliminary Report (09 May 2022 07:02):    No growth to date.    Culture - Blood (collected 07 May 2022 22:03)  Source: .Blood None  Preliminary Report (09 May 2022 07:02):    No growth to date.    Radiology: all available radiological tests reviewed        Advanced directives addressed: full resuscitation

## 2022-05-11 LAB
ALBUMIN SERPL ELPH-MCNC: 2 G/DL — LOW (ref 3.3–5)
ALP SERPL-CCNC: 82 U/L — SIGNIFICANT CHANGE UP (ref 40–120)
ALT FLD-CCNC: 32 U/L — SIGNIFICANT CHANGE UP (ref 12–78)
ANION GAP SERPL CALC-SCNC: 6 MMOL/L — SIGNIFICANT CHANGE UP (ref 5–17)
AST SERPL-CCNC: 53 U/L — HIGH (ref 15–37)
BILIRUB SERPL-MCNC: 2.4 MG/DL — HIGH (ref 0.2–1.2)
BUN SERPL-MCNC: 20 MG/DL — SIGNIFICANT CHANGE UP (ref 7–23)
CALCIUM SERPL-MCNC: 7.9 MG/DL — LOW (ref 8.5–10.1)
CHLORIDE SERPL-SCNC: 105 MMOL/L — SIGNIFICANT CHANGE UP (ref 96–108)
CO2 SERPL-SCNC: 27 MMOL/L — SIGNIFICANT CHANGE UP (ref 22–31)
CREAT SERPL-MCNC: 0.77 MG/DL — SIGNIFICANT CHANGE UP (ref 0.5–1.3)
EGFR: 104 ML/MIN/1.73M2 — SIGNIFICANT CHANGE UP
GLUCOSE SERPL-MCNC: 90 MG/DL — SIGNIFICANT CHANGE UP (ref 70–99)
HCT VFR BLD CALC: 32.1 % — LOW (ref 39–50)
HGB BLD-MCNC: 11.1 G/DL — LOW (ref 13–17)
MCHC RBC-ENTMCNC: 33.9 PG — SIGNIFICANT CHANGE UP (ref 27–34)
MCHC RBC-ENTMCNC: 34.6 GM/DL — SIGNIFICANT CHANGE UP (ref 32–36)
MCV RBC AUTO: 98.2 FL — SIGNIFICANT CHANGE UP (ref 80–100)
PLATELET # BLD AUTO: 55 K/UL — LOW (ref 150–400)
POTASSIUM SERPL-MCNC: 4.3 MMOL/L — SIGNIFICANT CHANGE UP (ref 3.5–5.3)
POTASSIUM SERPL-SCNC: 4.3 MMOL/L — SIGNIFICANT CHANGE UP (ref 3.5–5.3)
PROT SERPL-MCNC: 4.9 GM/DL — LOW (ref 6–8.3)
RBC # BLD: 3.27 M/UL — LOW (ref 4.2–5.8)
RBC # FLD: 13.5 % — SIGNIFICANT CHANGE UP (ref 10.3–14.5)
SARS-COV-2 RNA SPEC QL NAA+PROBE: SIGNIFICANT CHANGE UP
SODIUM SERPL-SCNC: 138 MMOL/L — SIGNIFICANT CHANGE UP (ref 135–145)
WBC # BLD: 3.86 K/UL — SIGNIFICANT CHANGE UP (ref 3.8–10.5)
WBC # FLD AUTO: 3.86 K/UL — SIGNIFICANT CHANGE UP (ref 3.8–10.5)

## 2022-05-11 PROCEDURE — 99233 SBSQ HOSP IP/OBS HIGH 50: CPT

## 2022-05-11 PROCEDURE — 49083 ABD PARACENTESIS W/IMAGING: CPT

## 2022-05-11 RX ADMIN — CEFTRIAXONE 100 MILLIGRAM(S): 500 INJECTION, POWDER, FOR SOLUTION INTRAMUSCULAR; INTRAVENOUS at 11:16

## 2022-05-11 NOTE — PROGRESS NOTE ADULT - SUBJECTIVE AND OBJECTIVE BOX
GI  seen 830 am, late note entry    no n/v  remains with some abd pain   remains quite distended    REVIEW OF SYSTEMS:    CONSTITUTIONAL: No weakness, +fevers no chills  EYES/ENT: No visual changes;  No vertigo or throat pain   NECK: No pain or stiffness  RESPIRATORY: No cough, wheezing, hemoptysis; No shortness of breath  CARDIOVASCULAR: No chest pain or palpitations  GASTROINTESTINAL: see HPI  GENITOURINARY: No dysuria, frequency or hematuria  NEUROLOGICAL: No numbness or weakness  SKIN: No itching, burning, rashes, or lesions   PSYCH: Normal mood and affect  All other review of systems is negative unless indicated above.      PHYSICAL EXAM:  Vital Signs Last 24 Hrs  T(C): 36.8 (11 May 2022 16:11), Max: 36.9 (11 May 2022 10:03)  T(F): 98.2 (11 May 2022 16:11), Max: 98.5 (11 May 2022 10:03)  HR: 69 (11 May 2022 16:11) (69 - 79)  BP: 123/60 (11 May 2022 16:11) (123/60 - 131/67)  BP(mean): --  RR: 18 (11 May 2022 16:11) (18 - 18)  SpO2: 93% (11 May 2022 16:11) (93% - 96%)    Constitutional: No acute distress, non-toxic appearing, pleasant  HEENT: un-masked, good phonation, not icteric  Neck: supple   Respiratory: clear to ascultation bilaterally, no wheezing  Cardiovascular: S1 and S2, regular rate and rhythm, no murmurs rubs or gallops  Gastrointestinal: soft, tender to deep palpation, + distended (ascites), +bowel sounds, no rebound or guarding, no drains  Extremities: bilateral LE edema right > left with sig hyperpigmentation noted   Neurological: A/O x 3, no focal deficits, no asterixis  Psychiatric: Normal mood, normal affect  Skin: No rashes, not jaundiced    LABS:                                                                        11.1   3.86  )-----------( 55       ( 11 May 2022 07:49 )             32.1     05-11    138  |  105  |  20  ----------------------------<  90  4.3   |  27  |  0.77    Ca    7.9<L>      11 May 2022 07:49    TPro  4.9<L>  /  Alb  2.0<L>  /  TBili  2.4<H>  /  DBili  x   /  AST  53<H>  /  ALT  32  /  AlkPhos  82  05-11

## 2022-05-11 NOTE — PROGRESS NOTE ADULT - NUTRITIONAL ASSESSMENT
This patient has been assessed with a concern for Malnutrition and has been determined to have a diagnosis/diagnoses of Moderate protein-calorie malnutrition.    This patient is being managed with:   Diet Regular-  Entered: May  8 2022 12:16PM    
This patient has been assessed with a concern for Malnutrition and has been determined to have a diagnosis/diagnoses of Moderate protein-calorie malnutrition.      
This patient has been assessed with a concern for Malnutrition and has been determined to have a diagnosis/diagnoses of Moderate protein-calorie malnutrition.    This patient is being managed with:   Diet Regular-  Entered: May  8 2022 12:16PM

## 2022-05-11 NOTE — PROGRESS NOTE ADULT - ASSESSMENT
56 year old man with decompensated ETOH cirrhosis (last drink 7 months ago), PVT on apixaban, admitted for abdominal pain and fevers.   s/p US guided paracentesis confirming SBP.    -cont on ceftriaxone 2 gm IV daily  -f/u ascites cx  -LVP today and give albumin replacement, called in to IR  -hold diuretics today and resume in AM if Cr stable  -monitor LFT and CBC  -should be on 2 g sodium diet  -will likely need TIPS however with PV thombus this may not be technically possible  -continue anticoag and he is pending MRI abdomen later this week as outpt to reassess PV thrombus.   -d/w pt in detail  -possible d/c tomorrow on PO abx

## 2022-05-11 NOTE — PROGRESS NOTE ADULT - SUBJECTIVE AND OBJECTIVE BOX
CC: Abd distention, fever  · Subjective and Objective:   57 y/o M PMHx of alcoholic cirrhosis, EtOH abuse (last drink was reportedly on 10/31/2021), esophageal varices, left pleural effusion, chronic partial portal vein thrombosis (on Apixaban) who was referred to the ED by his Gastroenterologist for further evaluation for worsening ascites, abdominal pain, and subjective fevers at home (Tmax 102'F). Of note the patient is s/p US guided therapeutic paracentesis by Interventional Radiology on 4/29/2022 at  with 8000cc ascites removed. Additionally the patient c/o shortness of breath despite his Furosemide dose being increased to 80mg po BID 5 days ago. Labs => PLT 65, INR 1.49, Na 134, Alb 2.7, TBili 4.8, AST 69, LA 2.2. In the ED the patient received Ceftriaxone 2g IVPB x 1, and Ibuprofen 400mg PO x 1.       05/08/22: Patient seen and examined. Abd pain better today.   05/09/22: S/P diagnostic paracentesis today. Discussed with patient regarding management plan.   5/10: In bed, alert, abd distended, no fevers.  Updated pt on plan of care.  5/11: Lying in bed, afebrile, no specific complaints, awaiting paracentesis.      REVIEW OF SYSTEMS: All other review of systems is negative unless indicated above.    Vital Signs Last 24 Hrs  T(C): 36.9 (11 May 2022 10:03), Max: 36.9 (11 May 2022 10:03)  T(F): 98.5 (11 May 2022 10:03), Max: 98.5 (11 May 2022 10:03)  HR: 79 (11 May 2022 10:03) (74 - 79)  BP: 131/67 (11 May 2022 10:03) (126/64 - 131/67)  BP(mean): --  RR: 18 (11 May 2022 10:03) (18 - 18)  SpO2: 96% (11 May 2022 10:03) (95% - 100%)    PHYSICAL EXAM:    Constitutional: NAD, awake and alert, well-developed  HEENT: PERR, EOMI, Normal Hearing, MMM  Neck: Soft and supple  Respiratory: Breath sounds are clear bilaterally, No wheezing, rales or rhonchi  Cardiovascular: S1 and S2, regular rate and rhythm, no Murmurs, gallops or rubs  Gastrointestinal: Bowel Sounds present, soft, nontender, large distended abd, no guarding, no rebound  Extremities: No peripheral edema  Neurological: A/O x 3, no focal deficits in my limited exam    MEDICATIONS  (STANDING):  cefTRIAXone   IVPB 2000 milliGRAM(s) IV Intermittent every 24 hours    MEDICATIONS  (PRN):  ondansetron Injectable 4 milliGRAM(s) IV Push every 8 hours PRN Nausea and/or Vomiting                                11.1   3.86  )-----------( 55       ( 11 May 2022 07:49 )             32.1     05-11    138  |  105  |  20  ----------------------------<  90  4.3   |  27  |  0.77    Ca    7.9<L>      11 May 2022 07:49    TPro  4.9<L>  /  Alb  2.0<L>  /  TBili  2.4<H>  /  DBili  x   /  AST  53<H>  /  ALT  32  /  AlkPhos  82  05-11    CAPILLARY BLOOD GLUCOSE        LIVER FUNCTIONS - ( 11 May 2022 07:49 )  Alb: 2.0 g/dL / Pro: 4.9 gm/dL / ALK PHOS: 82 U/L / ALT: 32 U/L / AST: 53 U/L / GGT: x                   Assessment/Plan: 57 y/o M PMHx of alcoholic cirrhosis, EtOH abuse (last drink was reportedly on 10/31/2021), esophageal varices, left pleural effusion, chronic partial portal vein thrombosis (on Apixaban) who was referred to the [facility] by his Gastroenterologist for further evaluation for worsening ascites, abdominal pain, and subjective fevers at home (Tmax 102'F).     #Large Ascites due to Decompensated Alcoholic Cirrhosis with Probable Spontaneous Bacterial Peritonitis:  - unable to determine if Spontaneous Bacterial Peritonitis related to recent paracentesis  - Pneumonia ruled out  - IV lasix and oral eplerenone on hold for paracentesis  - c/w IV ceftriaxone 2g qd for probable SBP per ID  - body fluid cultures no growth  ~Blood cultures: no growth  - GI following, ultimately may need TIPS as out patient.  He is followed closely by Dr. Maya.  - 2g Na diet   - albumin infusion with large volume paracentesis which is scheduled by IR for today.       #Transaminitis / Thrombocytopenia / hyperbilirubinemia: Due to above  - trending down, monitor      #Portal vein thrombosis  - eliquis       #Vte ppx  - eliquis      Assessment and Plan:   Nutritional Assessment:  · Nutritional Assessment	This patient has been assessed with a concern for Malnutrition and has been determined to have a diagnosis/diagnoses of Moderate protein-calorie malnutrition.    This patient is being managed with:   Diet Regular-  Entered: May  8 2022 12:16PM

## 2022-05-12 ENCOUNTER — TRANSCRIPTION ENCOUNTER (OUTPATIENT)
Age: 57
End: 2022-05-12

## 2022-05-12 VITALS
TEMPERATURE: 98 F | SYSTOLIC BLOOD PRESSURE: 114 MMHG | RESPIRATION RATE: 18 BRPM | DIASTOLIC BLOOD PRESSURE: 73 MMHG | HEART RATE: 88 BPM | OXYGEN SATURATION: 98 %

## 2022-05-12 LAB
ALBUMIN SERPL ELPH-MCNC: 2.3 G/DL — LOW (ref 3.3–5)
ALP SERPL-CCNC: 86 U/L — SIGNIFICANT CHANGE UP (ref 40–120)
ALT FLD-CCNC: 30 U/L — SIGNIFICANT CHANGE UP (ref 12–78)
ANION GAP SERPL CALC-SCNC: 5 MMOL/L — SIGNIFICANT CHANGE UP (ref 5–17)
AST SERPL-CCNC: 50 U/L — HIGH (ref 15–37)
BILIRUB SERPL-MCNC: 1.9 MG/DL — HIGH (ref 0.2–1.2)
BUN SERPL-MCNC: 17 MG/DL — SIGNIFICANT CHANGE UP (ref 7–23)
CALCIUM SERPL-MCNC: 8.2 MG/DL — LOW (ref 8.5–10.1)
CHLORIDE SERPL-SCNC: 106 MMOL/L — SIGNIFICANT CHANGE UP (ref 96–108)
CO2 SERPL-SCNC: 28 MMOL/L — SIGNIFICANT CHANGE UP (ref 22–31)
CREAT SERPL-MCNC: 0.75 MG/DL — SIGNIFICANT CHANGE UP (ref 0.5–1.3)
EGFR: 105 ML/MIN/1.73M2 — SIGNIFICANT CHANGE UP
GLUCOSE SERPL-MCNC: 93 MG/DL — SIGNIFICANT CHANGE UP (ref 70–99)
POTASSIUM SERPL-MCNC: 4.2 MMOL/L — SIGNIFICANT CHANGE UP (ref 3.5–5.3)
POTASSIUM SERPL-SCNC: 4.2 MMOL/L — SIGNIFICANT CHANGE UP (ref 3.5–5.3)
PROT SERPL-MCNC: 5.1 GM/DL — LOW (ref 6–8.3)
SODIUM SERPL-SCNC: 139 MMOL/L — SIGNIFICANT CHANGE UP (ref 135–145)

## 2022-05-12 PROCEDURE — 99239 HOSP IP/OBS DSCHRG MGMT >30: CPT

## 2022-05-12 RX ORDER — CEFUROXIME AXETIL 250 MG
1 TABLET ORAL
Qty: 20 | Refills: 0
Start: 2022-05-12 | End: 2022-05-21

## 2022-05-12 RX ADMIN — CEFTRIAXONE 100 MILLIGRAM(S): 500 INJECTION, POWDER, FOR SOLUTION INTRAMUSCULAR; INTRAVENOUS at 11:29

## 2022-05-12 NOTE — PROGRESS NOTE ADULT - REASON FOR ADMISSION
Abdominal Pain, Subjective Fevers, Weight Loss

## 2022-05-12 NOTE — DISCHARGE NOTE PROVIDER - NSDCFUSCHEDAPPT_GEN_ALL_CORE_FT
[facility] Physician [facility] [id number]  Scheduled Appointment: 05/13/2022    Indu Butt  [facility] Physician [facility]  Hepatology [address]   Scheduled Appointment: 06/21/2022

## 2022-05-12 NOTE — DISCHARGE NOTE NURSING/CASE MANAGEMENT/SOCIAL WORK - NSDCPEFALRISK_GEN_ALL_CORE
For information on Fall & Injury Prevention, visit: [email or web address] OR  [email or web address] OR  https://www.cdc.gov/steadi/patient.html

## 2022-05-12 NOTE — DISCHARGE NOTE PROVIDER - NSDCMRMEDTOKEN_GEN_ALL_CORE_FT
cefuroxime 500 mg oral tablet: 1 tab(s) orally 2 times a day   Eliquis 5 mg oral tablet: 1 tab(s) orally 2 times a day. Starting(12/22).  Then you will start taking 1 pill 2 times a day.   eplerenone 50 mg oral tablet: 1 tab(s) orally once a day  furosemide 40 mg oral tablet: 2 tab(s) orally 2 times a day  magnesium aspartate: 400 milligram(s) orally 2 times a day  metOLazone 2.5 mg oral tablet: 1 tab(s) orally once a day

## 2022-05-12 NOTE — PROGRESS NOTE ADULT - SUBJECTIVE AND OBJECTIVE BOX
Date of service: 22 @ 11:47    Lying in bed in NAD  Abdomen is improved; denies pain  No fever    ROS: no fever or chills; denies dizziness, no HA, no SOB or cough, no diarrhea or constipation; no dysuria, no legs pain, no rashes    MEDICATIONS  (STANDING):  cefTRIAXone   IVPB 2000 milliGRAM(s) IV Intermittent every 24 hours    Vital Signs Last 24 Hrs  T(C): 37.2 (12 May 2022 07:52), Max: 37.2 (12 May 2022 07:52)  T(F): 98.9 (12 May 2022 07:52), Max: 98.9 (12 May 2022 07:52)  HR: 73 (12 May 2022 07:52) (69 - 78)  BP: 97/51 (12 May 2022 07:52) (97/51 - 123/60)  BP(mean): --  RR: 18 (12 May 2022 07:52) (18 - 18)  SpO2: 93% (12 May 2022 07:52) (93% - 93%)     Physical exam:    Constitutional:  No acute distress  HEENT: NC/AT, EOMI, PERRLA, conjunctivae clear; ears and nose atraumatic  Neck: supple; thyroid not palpable  Back: no tenderness  Respiratory: respiratory effort normal; clear to auscultation  Cardiovascular: S1S2 regular, no murmurs  Abdomen: soft, mild periombilical tender, distended, positive BS  Genitourinary: no suprapubic tenderness  Lymphatic: no LN palpable  Musculoskeletal: no muscle tenderness, no joint swelling or tenderness  Extremities: no pedal edema  Neurological/ Psychiatric: AxOx3, moving all extremities  Skin: no rashes; no palpable lesions    Labs: reviewed                        .   3.86  )-----------( 55       ( 11 May 2022 07:49 )             32.1         139  |  106  |  17  ----------------------------<  93  4.2   |  28  |  0.75    Ca    8.2<L>      12 May 2022 07:52    TPro  5.1<L>  /  Alb  2.3<L>  /  TBili  1.9<H>  /  DBili  x   /  AST  50<H>  /  ALT  30  /  AlkPhos  86  -12                        11.4   8.79  )-----------( 53       ( 08 May 2022 08:05 )             33.4     0508    134<L>  |  103  |  23  ----------------------------<  116<H>  4.7   |  26  |  1.24    Ca    8.4<L>      08 May 2022 08:05  Mg     2.1     -    TPro  4.8<L>  /  Alb  2.0<L>  /  TBili  4.5<H>  /  DBili  x   /  AST  55<H>  /  ALT  32  /  AlkPhos  76  05-08     LIVER FUNCTIONS - ( 08 May 2022 08:05 )  Alb: 2.0 g/dL / Pro: 4.8 gm/dL / ALK PHOS: 76 U/L / ALT: 32 U/L / AST: 55 U/L / GGT: x           Urinalysis Basic - ( 08 May 2022 03:08 )    Color: Nena / Appearance: Clear / S.025 / pH: x  Gluc: x / Ketone: Small  / Bili: Small / Urobili: Negative   Blood: x / Protein: 30 mg/dL / Nitrite: Negative   Leuk Esterase: Trace / RBC: 3-5 /HPF / WBC 3-5   Sq Epi: x / Non Sq Epi: Few / Bacteria: Few    Total Nucleated Cell Count, Body Fluid: 1273    Culture - Acid Fast - Body Fluid w/Smear (collected 09 May 2022 12:40)  Source: .Body Fluid Peritoneal Fluid  Preliminary Report (11 May 2022 15:05):    Culture is being performed.    Culture - Fungal, Body Fluid (collected 09 May 2022 12:40)  Source: .Body Fluid Peritoneal Fluid  Preliminary Report (10 May 2022 08:13):    Testing in progress    Culture - Body Fluid with Gram Stain (collected 09 May 2022 12:40)  Source: .Body Fluid Peritoneal Fluid  Gram Stain (09 May 2022 23:19):    polymorphonuclear leukocytes seen    No organisms seen    by cytocentrifuge  Preliminary Report (10 May 2022 18:34):    No growth    Culture - Blood (collected 07 May 2022 22:03)  Source: .Blood None  Preliminary Report (09 May 2022 07:02):    No growth to date.    Culture - Blood (collected 07 May 2022 22:03)  Source: .Blood None  Preliminary Report (09 May 2022 07:02):    No growth to date.    Radiology: all available radiological tests reviewed    < from: Xray Chest 1 View- PORTABLE-Urgent (Xray Chest 1 View- PORTABLE-Urgent .) (22 @ 22:09) >  IMPRESSION: Left lower lobe pneumonia    < end of copied text >      Advanced directives addressed: full resuscitation

## 2022-05-12 NOTE — DISCHARGE NOTE NURSING/CASE MANAGEMENT/SOCIAL WORK - PATIENT PORTAL LINK FT
You can access the FollowMyHealth Patient Portal offered by [facility] by registering at the following website: [email or web address]. By joining [employer]’s FollowMyHealth portal, you will also be able to view your health information using other applications (apps) compatible with our system.

## 2022-05-12 NOTE — PROGRESS NOTE ADULT - PROVIDER SPECIALTY LIST ADULT
Infectious Disease
Gastroenterology
Gastroenterology
Hospitalist
Gastroenterology
Infectious Disease
Infectious Disease
Gastroenterology

## 2022-05-12 NOTE — PROGRESS NOTE ADULT - ASSESSMENT
56 year old man with decompensated ETOH cirrhosis (last drink 7 months ago), PVT on apixaban, admitted for abdominal pain and fevers.   s/p US guided paracentesis confirming SBP.  s/p LVP 5/11/22    -home abx per ID, then may need ppx if ascites remains an ongoing issue  -resume home diuretics today and add metolazone 2.5 mg 30 min before am lasix dose only  -should be on 2 g sodium diet  -will likely need TIPS however with PV thombus this may not be technically possible  -continue anticoag and he is pending MRI abdomen later this week as outpt to reassess PV thrombus.   -d/w pt in detail  -likely d/c home today and will follow up with me in 1 week to check labs and fluid status

## 2022-05-12 NOTE — PROGRESS NOTE ADULT - SUBJECTIVE AND OBJECTIVE BOX
GI  seen 800 am, late note entry    no n/v  no abd pain   s/p LVP yesterday, less distended today  feels ready to go home    REVIEW OF SYSTEMS:    CONSTITUTIONAL: No weakness, no current fevers or chills  EYES/ENT: No visual changes;  No vertigo or throat pain   NECK: No pain or stiffness  RESPIRATORY: No cough, wheezing, hemoptysis; No shortness of breath  CARDIOVASCULAR: No chest pain or palpitations  GASTROINTESTINAL: see HPI  GENITOURINARY: No dysuria, frequency or hematuria  NEUROLOGICAL: No numbness or weakness  SKIN: No itching, burning, rashes, or lesions   PSYCH: Normal mood and affect  All other review of systems is negative unless indicated above.      PHYSICAL EXAM:  Vital Signs Last 24 Hrs  T(C): 37.2 (12 May 2022 07:52), Max: 37.2 (12 May 2022 07:52)  T(F): 98.9 (12 May 2022 07:52), Max: 98.9 (12 May 2022 07:52)  HR: 73 (12 May 2022 07:52) (69 - 78)  BP: 97/51 (12 May 2022 07:52) (97/51 - 123/60)  BP(mean): --  RR: 18 (12 May 2022 07:52) (18 - 18)  SpO2: 93% (12 May 2022 07:52) (93% - 93%)    Constitutional: No acute distress, non-toxic appearing, pleasant  HEENT: un-masked, good phonation, not icteric  Neck: supple   Respiratory: clear to ascultation bilaterally, no wheezing  Cardiovascular: S1 and S2, regular rate and rhythm, no murmurs rubs or gallops  Gastrointestinal: soft, tender to deep palpation, +less distended (ascites), +bowel sounds, no rebound or guarding, no drains  Extremities: bilateral LE edema right > left with sig hyperpigmentation noted   Neurological: A/O x 3, no focal deficits, no asterixis  Psychiatric: Normal mood, normal affect  Skin: No rashes, not jaundiced    LABS:                                                               11.1   3.86  )-----------( 55       ( 11 May 2022 07:49 )             32.1   05-12    139  |  106  |  17  ----------------------------<  93  4.2   |  28  |  0.75    Ca    8.2<L>      12 May 2022 07:52    TPro  5.1<L>  /  Alb  2.3<L>  /  TBili  1.9<H>  /  DBili  x   /  AST  50<H>  /  ALT  30  /  AlkPhos  86  05-12

## 2022-05-12 NOTE — DISCHARGE NOTE PROVIDER - CARE PROVIDER_API CALL
Alverto Maya)  Gastroenterology; Internal Medicine  [address] [place] [address]  Phone: [phone]  Fax: [phone]  Follow Up Time: 1 week

## 2022-05-12 NOTE — DISCHARGE NOTE PROVIDER - DETAILS OF MALNUTRITION DIAGNOSIS/DIAGNOSES
This patient has been assessed with a concern for Malnutrition and was treated during this hospitalization for the following Nutrition diagnosis/diagnoses:     -  05/08/2022: Moderate protein-calorie malnutrition

## 2022-05-12 NOTE — PROGRESS NOTE ADULT - ASSESSMENT
55 y/o Male with h/o alcoholic cirrhosis, esophageal varices, left pleural effusion, chronic partial portal vein thrombosis (on Apixaban) was admitted on 5/7 after he was referred to the [facility] by his Gastroenterologist for further evaluation for worsening ascites, abdominal pain, and subjective fevers at home (Tmax 102'F). Of note the patient is s/p US guided therapeutic paracentesis by Interventional Radiology on 4/29/2022 at  with 8000cc ascites removed. Additionally the patient has shortness of breath despite his Furosemide dose being increased to 80mg po BID 5 days ago. In ER he received ceftriaxone and vancomycin IV.     1. Febrile syndrome. Alcoholic cirrhosis. Probable SBP s/p paracentesis. LLL pneumonia.   -COVID exposure - place on droplet isolation - monitor for respiratory symptoms  -no SOB or cough  -BC x 2 noted  -on ceftriaxone 2 gm IV qd # 5  -tolerating abx well so far; no side effects noted  -GI evaluation appreciated  -s/p paracentesis - fluid analysis and culture reviewed  -monitor abdomen  -may change abx to ceftin 500 mg PO q12h for 10 more days  -monitor temps  -f/u CBC  -supportive care  2. Other issues:   -care per medicine

## 2022-05-12 NOTE — DISCHARGE NOTE PROVIDER - HOSPITAL COURSE
· Subjective and Objective:   55 y/o M PMHx of alcoholic cirrhosis, EtOH abuse (last drink was reportedly on 10/31/2021), esophageal varices, left pleural effusion, chronic partial portal vein thrombosis (on Apixaban) who was referred to the ED by his Gastroenterologist for further evaluation for worsening ascites, abdominal pain, and subjective fevers at home (Tmax 102'F). Of note the patient is s/p US guided therapeutic paracentesis by Interventional Radiology on 4/29/2022 at  with 8000cc ascites removed. Additionally the patient c/o shortness of breath despite his Furosemide dose being increased to 80mg po BID 5 days ago. Labs => PLT 65, INR 1.49, Na 134, Alb 2.7, TBili 4.8, AST 69, LA 2.2. In the ED the patient received Ceftriaxone 2g IVPB x 1, and Ibuprofen 400mg PO x 1.     Hospital course:  Pt admitted for decompensated alcoholic cirrhosis with probable spontaneous bacterial peritonitis.  He was placed on ceftriaxone 2g qd empirically per ID.  He had a diagnostic and therapeutic tap of ~7.7L with improvement in his abd distention and discomfort.  Pt remains afebrile on antibiotics.  Fluid analysis not growing anything but does show elevated WBC consistent with SBP.  Pt feeling better.  His diuretics were held yesterday and today and to resume tomorrow with addition of metolazone per GI.  He is to follow up with Dr. Maya next week.    REVIEW OF SYSTEMS: All other review of systems is negative unless indicated above.    Vital Signs Last 24 Hrs  T(C): 37.2 (12 May 2022 07:52), Max: 37.2 (12 May 2022 07:52)  T(F): 98.9 (12 May 2022 07:52), Max: 98.9 (12 May 2022 07:52)  HR: 73 (12 May 2022 07:52) (69 - 78)  BP: 97/51 (12 May 2022 07:52) (97/51 - 123/60)  BP(mean): --  RR: 18 (12 May 2022 07:52) (18 - 18)  SpO2: 93% (12 May 2022 07:52) (93% - 93%)      PHYSICAL EXAM:    Constitutional: NAD, awake and alert, well-developed  HEENT: PERR, EOMI, Normal Hearing, MMM  Neck: Soft and supple  Respiratory: Breath sounds are clear bilaterally, No wheezing, rales or rhonchi  Cardiovascular: S1 and S2, regular rate and rhythm, no Murmurs, gallops or rubs  Gastrointestinal: Bowel Sounds present, soft, nontender, abd less distended, no guarding, no rebound  Extremities: No peripheral edema  Neurological: A/O x 3, no focal deficits in my limited exam    med/labs: Reviewed and interpreted       Assessment/Plan: 55 y/o M PMHx of alcoholic cirrhosis, EtOH abuse (last drink was reportedly on 10/31/2021), esophageal varices, left pleural effusion, chronic partial portal vein thrombosis (on Apixaban) who was referred to the [facility] by his Gastroenterologist for further evaluation for worsening ascites, abdominal pain, and subjective fevers at home (Tmax 102'F).     #Large Ascites due to Decompensated Alcoholic Cirrhosis with Probable Spontaneous Bacterial Peritonitis:  - unable to determine if Spontaneous Bacterial Peritonitis related to recent paracentesis  - Pneumonia ruled out  - resume home diuretics upon discharge starting tomorrow with addition of metolazone per GI.  - c/w IV ceftriaxone 2g qd for probable SBP per ID.  discharge on oral ceftin for 10 more days.  - body fluid cultures no growth  ~Blood cultures: no growth  - GI following, ultimately may need TIPS as out patient.  He is followed closely by Dr. Maya.  - 2g Na diet   - s/p 7/7L removal large volume paracentesis 5/11.      #Transaminitis / Thrombocytopenia / hyperbilirubinemia: Due to above  - out patient monitoring       #Portal vein thrombosis  - eliquis       #Vte ppx  - eliquis    d.c home with close out patient follow up    Attending Statement: 40 minutes spent on total encounter and discharge planning.

## 2022-05-12 NOTE — DISCHARGE NOTE PROVIDER - NSDCCPCAREPLAN_GEN_ALL_CORE_FT
PRINCIPAL DISCHARGE DIAGNOSIS  Diagnosis: Decompensated liver disease  Assessment and Plan of Treatment: Post large volume paracentesis 7.7L removed  restart home dose diuretics tomorrow  metolazone added to regimen (Sent to pharmacy) to be taken prior to lasix.  Take ceftin antibiotics for SBP (sent to pharmacy) for 10 more days  Follow up with Dr. Gibbsfo 3-5 days.  Resume home meds including eliquis.

## 2022-05-13 ENCOUNTER — OUTPATIENT (OUTPATIENT)
Dept: OUTPATIENT SERVICES | Facility: HOSPITAL | Age: 57
LOS: 1 days | End: 2022-05-13
Payer: COMMERCIAL

## 2022-05-13 ENCOUNTER — APPOINTMENT (OUTPATIENT)
Dept: ULTRASOUND IMAGING | Facility: CLINIC | Age: 57
End: 2022-05-13
Payer: COMMERCIAL

## 2022-05-13 DIAGNOSIS — I81 PORTAL VEIN THROMBOSIS: ICD-10-CM

## 2022-05-13 DIAGNOSIS — Z90.89 ACQUIRED ABSENCE OF OTHER ORGANS: Chronic | ICD-10-CM

## 2022-05-13 DIAGNOSIS — Z98.890 OTHER SPECIFIED POSTPROCEDURAL STATES: Chronic | ICD-10-CM

## 2022-05-13 PROCEDURE — 93975 VASCULAR STUDY: CPT

## 2022-05-13 PROCEDURE — 93975 VASCULAR STUDY: CPT | Mod: 26

## 2022-05-14 LAB
CULTURE RESULTS: SIGNIFICANT CHANGE UP
SPECIMEN SOURCE: SIGNIFICANT CHANGE UP

## 2022-05-19 DIAGNOSIS — E80.7 DISORDER OF BILIRUBIN METABOLISM, UNSPECIFIED: ICD-10-CM

## 2022-05-19 DIAGNOSIS — D69.59 OTHER SECONDARY THROMBOCYTOPENIA: ICD-10-CM

## 2022-05-19 DIAGNOSIS — K65.2 SPONTANEOUS BACTERIAL PERITONITIS: ICD-10-CM

## 2022-05-19 DIAGNOSIS — K70.31 ALCOHOLIC CIRRHOSIS OF LIVER WITH ASCITES: ICD-10-CM

## 2022-05-19 DIAGNOSIS — K70.0 ALCOHOLIC FATTY LIVER: ICD-10-CM

## 2022-05-19 DIAGNOSIS — F10.10 ALCOHOL ABUSE, UNCOMPLICATED: ICD-10-CM

## 2022-05-19 DIAGNOSIS — J90 PLEURAL EFFUSION, NOT ELSEWHERE CLASSIFIED: ICD-10-CM

## 2022-05-19 DIAGNOSIS — R74.01 ELEVATION OF LEVELS OF LIVER TRANSAMINASE LEVELS: ICD-10-CM

## 2022-05-19 DIAGNOSIS — E44.0 MODERATE PROTEIN-CALORIE MALNUTRITION: ICD-10-CM

## 2022-05-19 DIAGNOSIS — Z79.01 LONG TERM (CURRENT) USE OF ANTICOAGULANTS: ICD-10-CM

## 2022-05-19 DIAGNOSIS — I81 PORTAL VEIN THROMBOSIS: ICD-10-CM

## 2022-05-26 ENCOUNTER — INPATIENT (INPATIENT)
Facility: HOSPITAL | Age: 57
LOS: 1 days | Discharge: ANOTHER HOSPITAL | DRG: 432 | End: 2022-05-28
Attending: INTERNAL MEDICINE | Admitting: INTERNAL MEDICINE
Payer: COMMERCIAL

## 2022-05-26 ENCOUNTER — NON-APPOINTMENT (OUTPATIENT)
Age: 57
End: 2022-05-26

## 2022-05-26 VITALS — WEIGHT: 235.01 LBS | HEIGHT: 74 IN

## 2022-05-26 DIAGNOSIS — R18.8 OTHER ASCITES: ICD-10-CM

## 2022-05-26 DIAGNOSIS — Z90.89 ACQUIRED ABSENCE OF OTHER ORGANS: Chronic | ICD-10-CM

## 2022-05-26 DIAGNOSIS — Z98.890 OTHER SPECIFIED POSTPROCEDURAL STATES: Chronic | ICD-10-CM

## 2022-05-26 LAB
ALBUMIN SERPL ELPH-MCNC: 2.6 G/DL — LOW (ref 3.3–5)
ALP SERPL-CCNC: 116 U/L — SIGNIFICANT CHANGE UP (ref 40–120)
ALT FLD-CCNC: 41 U/L — SIGNIFICANT CHANGE UP (ref 12–78)
ANION GAP SERPL CALC-SCNC: 9 MMOL/L — SIGNIFICANT CHANGE UP (ref 5–17)
APPEARANCE UR: CLEAR — SIGNIFICANT CHANGE UP
APTT BLD: 36.7 SEC — HIGH (ref 27.5–35.5)
AST SERPL-CCNC: 72 U/L — HIGH (ref 15–37)
BASOPHILS # BLD AUTO: 0.01 K/UL — SIGNIFICANT CHANGE UP (ref 0–0.2)
BASOPHILS NFR BLD AUTO: 0.3 % — SIGNIFICANT CHANGE UP (ref 0–2)
BILIRUB SERPL-MCNC: 3.2 MG/DL — HIGH (ref 0.2–1.2)
BILIRUB UR-MCNC: NEGATIVE — SIGNIFICANT CHANGE UP
BUN SERPL-MCNC: 23 MG/DL — SIGNIFICANT CHANGE UP (ref 7–23)
CALCIUM SERPL-MCNC: 8.3 MG/DL — LOW (ref 8.5–10.1)
CHLORIDE SERPL-SCNC: 85 MMOL/L — LOW (ref 96–108)
CO2 SERPL-SCNC: 32 MMOL/L — HIGH (ref 22–31)
COLOR SPEC: YELLOW — SIGNIFICANT CHANGE UP
CREAT SERPL-MCNC: 1 MG/DL — SIGNIFICANT CHANGE UP (ref 0.5–1.3)
DIFF PNL FLD: NEGATIVE — SIGNIFICANT CHANGE UP
EGFR: 87 ML/MIN/1.73M2 — SIGNIFICANT CHANGE UP
EOSINOPHIL # BLD AUTO: 0.01 K/UL — SIGNIFICANT CHANGE UP (ref 0–0.5)
EOSINOPHIL NFR BLD AUTO: 0.3 % — SIGNIFICANT CHANGE UP (ref 0–6)
GLUCOSE SERPL-MCNC: 173 MG/DL — HIGH (ref 70–99)
GLUCOSE UR QL: NEGATIVE — SIGNIFICANT CHANGE UP
HCT VFR BLD CALC: 37.8 % — LOW (ref 39–50)
HGB BLD-MCNC: 13.1 G/DL — SIGNIFICANT CHANGE UP (ref 13–17)
IMM GRANULOCYTES NFR BLD AUTO: 0.3 % — SIGNIFICANT CHANGE UP (ref 0–1.5)
INR BLD: 2.18 RATIO — HIGH (ref 0.88–1.16)
KETONES UR-MCNC: NEGATIVE — SIGNIFICANT CHANGE UP
LEUKOCYTE ESTERASE UR-ACNC: ABNORMAL
LYMPHOCYTES # BLD AUTO: 0.51 K/UL — LOW (ref 1–3.3)
LYMPHOCYTES # BLD AUTO: 12.9 % — LOW (ref 13–44)
MCHC RBC-ENTMCNC: 33.2 PG — SIGNIFICANT CHANGE UP (ref 27–34)
MCHC RBC-ENTMCNC: 34.7 GM/DL — SIGNIFICANT CHANGE UP (ref 32–36)
MCV RBC AUTO: 95.9 FL — SIGNIFICANT CHANGE UP (ref 80–100)
MONOCYTES # BLD AUTO: 0.31 K/UL — SIGNIFICANT CHANGE UP (ref 0–0.9)
MONOCYTES NFR BLD AUTO: 7.8 % — SIGNIFICANT CHANGE UP (ref 2–14)
NEUTROPHILS # BLD AUTO: 3.11 K/UL — SIGNIFICANT CHANGE UP (ref 1.8–7.4)
NEUTROPHILS NFR BLD AUTO: 78.4 % — HIGH (ref 43–77)
NITRITE UR-MCNC: NEGATIVE — SIGNIFICANT CHANGE UP
PH UR: 7 — SIGNIFICANT CHANGE UP (ref 5–8)
PLATELET # BLD AUTO: 94 K/UL — LOW (ref 150–400)
POTASSIUM SERPL-MCNC: 3.1 MMOL/L — LOW (ref 3.5–5.3)
POTASSIUM SERPL-SCNC: 3.1 MMOL/L — LOW (ref 3.5–5.3)
PROT SERPL-MCNC: 5.7 GM/DL — LOW (ref 6–8.3)
PROT UR-MCNC: NEGATIVE — SIGNIFICANT CHANGE UP
PROTHROM AB SERPL-ACNC: 25.5 SEC — HIGH (ref 10.5–13.4)
RBC # BLD: 3.94 M/UL — LOW (ref 4.2–5.8)
RBC # FLD: 13.2 % — SIGNIFICANT CHANGE UP (ref 10.3–14.5)
SARS-COV-2 RNA SPEC QL NAA+PROBE: SIGNIFICANT CHANGE UP
SODIUM SERPL-SCNC: 126 MMOL/L — LOW (ref 135–145)
SP GR SPEC: 1 — LOW (ref 1.01–1.02)
UROBILINOGEN FLD QL: 1
WBC # BLD: 3.96 K/UL — SIGNIFICANT CHANGE UP (ref 3.8–10.5)
WBC # FLD AUTO: 3.96 K/UL — SIGNIFICANT CHANGE UP (ref 3.8–10.5)

## 2022-05-26 PROCEDURE — 87075 CULTR BACTERIA EXCEPT BLOOD: CPT

## 2022-05-26 PROCEDURE — 87102 FUNGUS ISOLATION CULTURE: CPT

## 2022-05-26 PROCEDURE — 36415 COLL VENOUS BLD VENIPUNCTURE: CPT

## 2022-05-26 PROCEDURE — 87040 BLOOD CULTURE FOR BACTERIA: CPT

## 2022-05-26 PROCEDURE — 49083 ABD PARACENTESIS W/IMAGING: CPT

## 2022-05-26 PROCEDURE — 80048 BASIC METABOLIC PNL TOTAL CA: CPT

## 2022-05-26 PROCEDURE — 88108 CYTOPATH CONCENTRATE TECH: CPT

## 2022-05-26 PROCEDURE — 99223 1ST HOSP IP/OBS HIGH 75: CPT

## 2022-05-26 PROCEDURE — C1729: CPT

## 2022-05-26 PROCEDURE — 82042 OTHER SOURCE ALBUMIN QUAN EA: CPT

## 2022-05-26 PROCEDURE — 88305 TISSUE EXAM BY PATHOLOGIST: CPT

## 2022-05-26 PROCEDURE — 84157 ASSAY OF PROTEIN OTHER: CPT

## 2022-05-26 PROCEDURE — 82945 GLUCOSE OTHER FLUID: CPT

## 2022-05-26 PROCEDURE — 87070 CULTURE OTHR SPECIMN AEROBIC: CPT

## 2022-05-26 PROCEDURE — 80053 COMPREHEN METABOLIC PANEL: CPT

## 2022-05-26 PROCEDURE — 71046 X-RAY EXAM CHEST 2 VIEWS: CPT

## 2022-05-26 PROCEDURE — 71046 X-RAY EXAM CHEST 2 VIEWS: CPT | Mod: 26

## 2022-05-26 PROCEDURE — 85027 COMPLETE CBC AUTOMATED: CPT

## 2022-05-26 PROCEDURE — 99285 EMERGENCY DEPT VISIT HI MDM: CPT

## 2022-05-26 PROCEDURE — 80076 HEPATIC FUNCTION PANEL: CPT

## 2022-05-26 PROCEDURE — 85610 PROTHROMBIN TIME: CPT

## 2022-05-26 PROCEDURE — 83615 LACTATE (LD) (LDH) ENZYME: CPT

## 2022-05-26 PROCEDURE — 89051 BODY FLUID CELL COUNT: CPT

## 2022-05-26 RX ORDER — POTASSIUM CHLORIDE 20 MEQ
40 PACKET (EA) ORAL ONCE
Refills: 0 | Status: COMPLETED | OUTPATIENT
Start: 2022-05-26 | End: 2022-05-26

## 2022-05-26 RX ORDER — EPLERENONE 50 MG/1
1 TABLET, FILM COATED ORAL
Qty: 0 | Refills: 0 | DISCHARGE

## 2022-05-26 RX ADMIN — Medication 40 MILLIEQUIVALENT(S): at 18:04

## 2022-05-26 NOTE — ED ADULT NURSE NOTE - OBJECTIVE STATEMENT
Pt presents to ER c/o abd pain, back pain and bloating. Onset of symptoms began this morning and have been consistent. Pt reports having paracentesis a couple weeks ago. AO x 3

## 2022-05-26 NOTE — ED STATDOCS - CLINICAL SUMMARY MEDICAL DECISION MAKING FREE TEXT BOX
Pt with hx of cirrhosis and ascites with abd pain. Pt in NA. Pt reports he was sent to see Dr. Maya. No fevers. Will repeat labs and consult GI.

## 2022-05-26 NOTE — PATIENT PROFILE ADULT - NSPRESCRALCFREQ_GEN_A_NUR
4 or more times a week Past history of daily ETOH use. Per patient has not consumed alcohol since 10/31/2021/Never

## 2022-05-26 NOTE — PATIENT PROFILE ADULT - FALL HARM RISK - HARM RISK INTERVENTIONS
5
Assistance with ambulation/Assistance OOB with selected safe patient handling equipment/Communicate Risk of Fall with Harm to all staff/Reinforce activity limits and safety measures with patient and family/Tailored Fall Risk Interventions/Visual Cue: Yellow wristband and red socks/Bed in lowest position, wheels locked, appropriate side rails in place/Call bell, personal items and telephone in reach/Instruct patient to call for assistance before getting out of bed or chair/Non-slip footwear when patient is out of bed/[place] to call system/Physically safe environment - no spills, clutter or unnecessary equipment/Purposeful Proactive Rounding/Room/bathroom lighting operational, light cord in reach

## 2022-05-26 NOTE — CONSULT NOTE ADULT - ASSESSMENT
57M with EtOH cirrhosis and PV thrombus and refractory ascites, recent SBP, now with reaccumulated ascites despite diuretics.    Rec:  -hold eliquis  -paracentesis tomorrow  -check cell count/culture for SBP  -continue diuretics for now  -trend LFTs  -d/w Dr. Butt re liver transplant candidacy or TIPS candidacy 57M with EtOH cirrhosis and PV thrombus and refractory ascites, recent SBP, now with reaccumulated ascites despite diuretics.    Rec:  -hold eliquis  -paracentesis tomorrow  -hold abx for now given normal WBC and afebrile  -check cell count/culture for SBP  -hold diuretics given hyponatremia and hypokalemia  -trend LFTs, lytes  -d/w Dr. Butt re liver transplant candidacy or TIPS candidacy

## 2022-05-26 NOTE — ED ADULT NURSE NOTE - COVID-19 RESULT
Response sent to patient, tried calling him and LVM that medication is not covered under his Medicaid insurance.  Can try sending to [facility] pharmacy for $35.00 cash payment.    Awaiting response from patient   NEGATIVE

## 2022-05-26 NOTE — ED STATDOCS - PHYSICAL EXAMINATION
PA NOTE: GEN: AOX3, NAD. HEENT: Throat clear. Airway is patent. EYES: PERRLA. EOMI. Head: NC/AT. NECK: Supple, No JVD. FROM. C-spine non-tender. CV:S1S2, RRR, LUNGS: Non-labored breathing, no tachypnea. O2sat 100% RA. CTA b/l. No w/r/r. CHEST: Equal chest expansion and rise. No deformity. ABD: Firm abd, +Ascites. Mild diffuse tenderness. No rebound, no guarding. No CVAT. EXT: 1+ pitting edema b/l ankles. 2+ distal pulses. SKIN: No rashes. NEURO: No focal deficits. CN II-XII intact. FROM. 5/5 motor and sensory. ~Franky Salvador PA-C

## 2022-05-26 NOTE — H&P ADULT - HISTORY OF PRESENT ILLNESS
58 y/o M PMHx of alcoholic cirrhosis (currently taking Lasix, Metolazone and Eplerenone) EtOH abuse (last drink was reportedly on 10/31/2021), esophageal varices, left pleural effusion, chronic partial portal vein thrombosis (on Apixaban) admitted from 5/7 - 5/12/2022 with decompensated alcoholic cirrhosis complicated by spontaneous bacterial peritonitis. The patient underwent a diagnostic and therapeutic tap of ~7.7L, and started on IV abx with Ceftriaxone 2g IVP daily. The patient's symptoms of abdominal distention and discomfort improved at that time, remained afebrile, with fluid analysis consistent with SBP.  The patient returns to the [facility] for further evaluation and management of subjective chills/fever, worsening back pain, abdominal pain, and distention associated with increasing ascites despite escalation of diuretics recently. In the ED GI and Interventional Radiology were consulted.  Labs => PLT 94, INR 2.18, NH3 64, Na 126, K 3.1, Cl 85, HCO3 32, Glu 173, TPro 5.7, Alb 2.6, TBili 3.2, AST 72. In the ED the patient was given Potassium chloride 40mEq po x 1.

## 2022-05-26 NOTE — H&P ADULT - ASSESSMENT
58 y/o M PMHx of alcoholic cirrhosis (currently taking Lasix, Metolazone and Eplerenone) EtOH abuse (last drink was reportedly on 10/31/2021), esophageal varices, left pleural effusion, chronic partial portal vein thrombosis (on Apixaban) admitted from 5/7 - 5/12/2022 with decompensated alcoholic cirrhosis complicated by spontaneous bacterial peritonitis. The patient underwent a diagnostic and therapeutic tap of ~7.7L, and started on IV abx with Ceftriaxone 2g IVP daily. The patient's symptoms of abdominal distention and discomfort improved at that time, remained afebrile, with fluid analysis consistent with SBP.  The patient returns to the [facility] for further evaluation and management of subjective chills/fever, worsening back pain, abdominal pain, and distention associated with increasing ascites despite escalation of diuretics recently. In the ED GI and Interventional Radiology were consulted.  Labs => PLT 94, INR 2.18, NH3 64, Na 126, K 3.1, Cl 85, HCO3 32, Glu 173, TPro 5.7, Alb 2.6, TBili 3.2, AST 72. In the ED the patient was given Potassium chloride 40mEq po x 1.    #Large Ascites due to Decompensated Alcoholic Cirrhosis with Probable Spontaneous Bacterial Peritonitis  #Transaminitis  #Thrombocytopenia  ~admit to Medicine  ~f/u PAN C+S (Blood C+S x 2)  ~cont. Ceftriaxone 2g IVPB daily + Vancomycin 1g IVPB q12h  ~f/u Vancomycin trough after 4th dose  ~f/u w/ ID consultation  ~f/u w/ GI consultation in the am  ~NPO after MN for diagnostic and therapeutic abdominal paracentesis in the am by Interventional Radiology  ~f/u ascitic fluid analysis including culture  ~prior charts reviewed  ~will hold am Apixaban therapy for possible paracentesis (please restart prn)  ~note; indication for anticoagulation is for Portal Vein thrombosis  ~cont. Eplerenone 50mg po daily  ~cont. Lasix 40mg IVP bid (takes 80mg po bid at home)  ~strict I/Os  ~daily weights  ~cont. low Na diet   ~cont. to trend LFTs    #Portal vein thrombosis  ~as noted plan is to hold off A/C for now until diagnostic and therapeutic abdominal paracentesis in the am     #Vte ppx  ~as above Apixaban on hold  ~cont. SCDs for now

## 2022-05-26 NOTE — H&P ADULT - NSHPRISKHIVSCREEN_GEN_ALL_CORE
Dieter is here in follow up to his obstructive sleep apnea. He has not been seen in about 4 months. He has since undergone a BiPAP titration (9/19/2017, [facility]) which showed excellent correction of his apnea on BiPAP ASV.  He wears his CPAP nightly. He is having some new back/hip pain problems but it is not interrupting his sleep. Once he goes to sleep, he generally sleep through the night unless he gets up to urinate. He is generally able to fall back asleep quickly. He will sometimes take his CPAP off early but this is becoming less frequent. His BIPAP download shows very well corrected apnea with excellent compliance without leak. The data was reviewed by me and discussed with the patient. Care of the patient's CPAP and routine replacement of supplies (every 6-12 months) was reviewed.  At this time, we will continue his current pressures. We will place an order with [facility] for new supplies. Since he is doing well, we will plan for followup in 1 year or sooner if needs arise. All questions were answered and he does appear to understand well.    Offered and patient declined

## 2022-05-26 NOTE — H&P ADULT - CONSTITUTIONAL DETAILS
distress due to pain no dyspnea on exertion/no claudication/no palpitations/no chest pain/no orthopnea/no paroxysmal nocturnal dyspnea/no peripheral edema

## 2022-05-26 NOTE — CONSULT NOTE ADULT - ASSESSMENT
56yo M with ascites referred to IR for paracentesis  - Ammonia, bilirubin, INR elevated when compared with recent priors  - Pt reported he took eliquis this AM

## 2022-05-26 NOTE — CONSULT NOTE ADULT - SUBJECTIVE AND OBJECTIVE BOX
GI consult    HPI: 57 year old male with a sig PMH of Portal Vein Thrombosis and Ascites due to alcoholic cirrhosis hospitalized originally 12/21 currently taking Lasix, metolazone and epleronone.  Ascites increasing and had repeat paracentesis. Had SBP recently and treated with abx.  Developed increased ascites despite escalation of diuretics recently, assoc with back and abdominal pain and chills/subjective fever. Came to ER.      PAST MEDICAL & SURGICAL HISTORY:  ETOH abuse      Alcoholic fatty liver      Thrombocytopenia concurrent with and due to alcoholism      Cirrhosis of liver with ascites      S/P abdominal paracentesis      History of tonsillectomy and adenoidectomy  as child          Home Medications:  eplerenone 50 mg oral tablet: 1 tab(s) orally 2 times a day (26 May 2022 18:52)  furosemide 40 mg oral tablet: 2 tab(s) orally 2 times a day (26 May 2022 18:52)  magnesium aspartate: 400 milligram(s) orally 2 times a day (26 May 2022 18:52)  Vitamin D3 50 mcg (2000 intl units) oral tablet: 1 tab(s) orally once a day (26 May 2022 18:52)      MEDICATIONS  (STANDING):    MEDICATIONS  (PRN):      Allergies    No Known Allergies    Intolerances        SOCIAL HISTORY: no EtOH since cirrhosis dx    FAMILY HISTORY:  No pertinent family history in first degree relatives        ROS  As above  Otherwise unremarkable, all systems reviewed    PE:  Vital Signs Last 24 Hrs  T(C): 37.1 (26 May 2022 12:14), Max: 37.1 (26 May 2022 12:14)  T(F): 98.7 (26 May 2022 12:14), Max: 98.7 (26 May 2022 12:14)  HR: 89 (26 May 2022 12:14) (89 - 89)  BP: 118/63 (26 May 2022 12:14) (118/63 - 118/63)  BP(mean): 79 (26 May 2022 12:14) (79 - 79)  RR: 18 (26 May 2022 12:14) (18 - 18)  SpO2: 100% (26 May 2022 12:14) (100% - 100%)    Constitutional: NAD, well-developed, A+Ox3  Anicteric   no asterixis  Respiratory: CTABL, breathing comfortably  Cardiovascular: S1 and S2, RRR  Gastrointestinal: +BS, soft, non tender, markedly distended, no mass  Extremities: warm, well perfused, 2+ edema  Psychiatric: Normal mood, normal affect  Neuro: moves all extremities, grossly intact  Skin: No rashes or lesions    LABS:                        13.1   3.96  )-----------( 94       ( 26 May 2022 12:39 )             37.8     05-26    126<L>  |  85<L>  |  23  ----------------------------<  173<H>  3.1<L>   |  32<H>  |  1.00    Ca    8.3<L>      26 May 2022 12:39    TPro  5.7<L>  /  Alb  2.6<L>  /  TBili  3.2<H>  /  DBili  x   /  AST  72<H>  /  ALT  41  /  AlkPhos  116  05-26    PT/INR - ( 26 May 2022 12:39 )   PT: 25.5 sec;   INR: 2.18 ratio         PTT - ( 26 May 2022 12:39 )  PTT:36.7 sec  LIVER FUNCTIONS - ( 26 May 2022 12:39 )  Alb: 2.6 g/dL / Pro: 5.7 gm/dL / ALK PHOS: 116 U/L / ALT: 41 U/L / AST: 72 U/L / GGT: x             RADIOLOGY & ADDITIONAL STUDIES:
Chief complaint:  Patient is a 57y old  Male who presents with a chief complaint of ascites    HPI:  55 y/o M PMHx of alcoholic cirrhosis, EtOH abuse (last drink was reportedly on 10/31/2021), esophageal varices, left pleural effusion, chronic partial portal vein thrombosis (on Apixaban) presented to ED with abdominal pain and nausea 2/2 ascites. IR consulted for paracentesis. Pt seen in ED, reported some abdominal discomfort. Denied fever, CP.     Allergies  No Known Allergies    PAST MEDICAL & SURGICAL HISTORY:  ETOH abuse      Alcoholic fatty liver      Thrombocytopenia concurrent with and due to alcoholism      Cirrhosis of liver with ascites      S/P abdominal paracentesis      History of tonsillectomy and adenoidectomy  as child          FAMILY HISTORY:  No pertinent family history in first degree relatives        Review of Systems:  As per HPI    Vital Signs Last 24 Hrs  T(C): 37.1 (26 May 2022 12:14), Max: 37.1 (26 May 2022 12:14)  T(F): 98.7 (26 May 2022 12:14), Max: 98.7 (26 May 2022 12:14)  HR: 89 (26 May 2022 12:14) (89 - 89)  BP: 118/63 (26 May 2022 12:14) (118/63 - 118/63)  BP(mean): 79 (26 May 2022 12:14) (79 - 79)  RR: 18 (26 May 2022 12:14) (18 - 18)  SpO2: 100% (26 May 2022 12:14) (100% - 100%)    GENERAL APPEARANCE: Well developed, in no acute distress.    ABDOMEN: Distended    NEUROLOGIC: Alert and oriented x 3. Normal affect.     CBC                        13.1   3.96  )-----------( 94       ( 26 May 2022 12:39 )             37.8       Chemistry  05-26    126<L>  |  85<L>  |  23  ----------------------------<  173<H>  3.1<L>   |  32<H>  |  1.00    Ca    8.3<L>      26 May 2022 12:39    TPro  5.7<L>  /  Alb  2.6<L>  /  TBili  3.2<H>  /  DBili  x   /  AST  72<H>  /  ALT  41  /  AlkPhos  116  05-26      PT/INR - ( 26 May 2022 12:39 )   PT: 25.5 sec;   INR: 2.18 ratio    PTT - ( 26 May 2022 12:39 )  PTT:36.7 sec

## 2022-05-26 NOTE — H&P ADULT - NSHPPHYSICALEXAM_GEN_ALL_CORE
Vital Signs Last 24 Hrs  T(C): 37.1 (26 May 2022 12:14), Max: 37.1 (26 May 2022 12:14)  T(F): 98.7 (26 May 2022 12:14), Max: 98.7 (26 May 2022 12:14)  HR: 89 (26 May 2022 12:14) (89 - 89)  BP: 118/63 (26 May 2022 12:14) (118/63 - 118/63)  BP(mean): 79 (26 May 2022 12:14) (79 - 79)  RR: 18 (26 May 2022 12:14) (18 - 18)  SpO2: 100% (26 May 2022 12:14) (100% - 100%)

## 2022-05-26 NOTE — PHARMACOTHERAPY INTERVENTION NOTE - COMMENTS
Medication reconciliation completed.  Reviewed Medication list and confirmed med allergies with patient; confirmed with Dr. First Medx.  Pt takes eliquis 5mg 2 times a day at home, pt states that he took morning dose but that med is on hold for the time being.

## 2022-05-26 NOTE — ED STATDOCS - PROGRESS NOTE DETAILS
Spoke with hospitalist dr. PAGE Lindsey, will admit patient. ~Franky Salvador PA-C Patient took his Eliquis this morning. IR not comfortable with performing paracentesis. Will admit patient, IR tomorrow. ~Franky Salvador PA-C Patient seen by Dr. Maya, who ordered paracentesis via IR. ~Franky Salvador PA-C PA: Patient is a 58 y/o male with PMHx of alcoholic fatty liver, cirrhosis, EtOH abuse, thrombocytopenia who presents to [facility] c/o swollen abdomen with pain in abd and back area. Last paracentesis on 5/12. Denies n/v/d, fevers. No other complaints at this time. ~Franky Salvador PA-C

## 2022-05-26 NOTE — ED STATDOCS - OBJECTIVE STATEMENT
56 y/o male with a PMHx of alcoholic fatty liver, cirrhosis, EtOH abuse, thrombocytopenia presents to the ED c/o back pain and abd pain. Pt reports he was sent to see Dr. Maya. Last paracentesis on 5/12. Denies n/v/d, fevers. No other complaints at this time.
Not applicable

## 2022-05-26 NOTE — ED STATDOCS - ATTENDING APP SHARED VISIT CONTRIBUTION OF CARE
No I,Gustavo Buckner MD,  performed the initial face to face bedside interview with this patient regarding history of present illness, review of symptoms and relevant past medical, social and family history.  I completed an independent physical examination.  I was the initial provider who evaluated this patient. I have signed out the follow up of any pending tests (i.e. labs, radiological studies) to the ACP.  I have communicated the patient’s plan of care and disposition with the ACP.  The history, relevant review of systems, past medical and surgical history, medical decision making, and physical examination was documented by the scribe in my presence and I attest to the accuracy of the documentation.

## 2022-05-26 NOTE — CONSULT NOTE ADULT - PROBLEM SELECTOR RECOMMENDATION 9
- Case reviewed with Dr. Agarwal, Dr. Maya. Recommend admission for management of liver failure.   - Hold eliquis for paracentesis tomorrow

## 2022-05-26 NOTE — ED ADULT TRIAGE NOTE - CHIEF COMPLAINT QUOTE
Pt comes to the ED complaining of abdominal pain and nausea. Pt with known liver disease, followed by Dr. Maya.

## 2022-05-27 ENCOUNTER — RESULT REVIEW (OUTPATIENT)
Age: 57
End: 2022-05-27

## 2022-05-27 LAB
ALBUMIN FLD-MCNC: 0.3 G/DL — SIGNIFICANT CHANGE UP
ALBUMIN SERPL ELPH-MCNC: 2.3 G/DL — LOW (ref 3.3–5)
ALP SERPL-CCNC: 97 U/L — SIGNIFICANT CHANGE UP (ref 40–120)
ALT FLD-CCNC: 33 U/L — SIGNIFICANT CHANGE UP (ref 12–78)
ANION GAP SERPL CALC-SCNC: 10 MMOL/L — SIGNIFICANT CHANGE UP (ref 5–17)
AST SERPL-CCNC: 57 U/L — HIGH (ref 15–37)
BILIRUB SERPL-MCNC: 3.4 MG/DL — HIGH (ref 0.2–1.2)
BUN SERPL-MCNC: 26 MG/DL — HIGH (ref 7–23)
CALCIUM SERPL-MCNC: 7.7 MG/DL — LOW (ref 8.5–10.1)
CHLORIDE SERPL-SCNC: 84 MMOL/L — LOW (ref 96–108)
CO2 SERPL-SCNC: 30 MMOL/L — SIGNIFICANT CHANGE UP (ref 22–31)
CREAT SERPL-MCNC: 0.96 MG/DL — SIGNIFICANT CHANGE UP (ref 0.5–1.3)
EGFR: 91 ML/MIN/1.73M2 — SIGNIFICANT CHANGE UP
GLUCOSE FLD-MCNC: 92 MG/DL — SIGNIFICANT CHANGE UP
GLUCOSE SERPL-MCNC: 158 MG/DL — HIGH (ref 70–99)
GRAM STN FLD: SIGNIFICANT CHANGE UP
HCT VFR BLD CALC: 29.7 % — LOW (ref 39–50)
HGB BLD-MCNC: 10.6 G/DL — LOW (ref 13–17)
LDH SERPL L TO P-CCNC: 167 U/L — SIGNIFICANT CHANGE UP
MCHC RBC-ENTMCNC: 33.3 PG — SIGNIFICANT CHANGE UP (ref 27–34)
MCHC RBC-ENTMCNC: 35.7 GM/DL — SIGNIFICANT CHANGE UP (ref 32–36)
MCV RBC AUTO: 93.4 FL — SIGNIFICANT CHANGE UP (ref 80–100)
PLATELET # BLD AUTO: 60 K/UL — LOW (ref 150–400)
POTASSIUM SERPL-MCNC: 2.9 MMOL/L — CRITICAL LOW (ref 3.5–5.3)
POTASSIUM SERPL-SCNC: 2.9 MMOL/L — CRITICAL LOW (ref 3.5–5.3)
PROT FLD-MCNC: <1 G/DL — SIGNIFICANT CHANGE UP
PROT SERPL-MCNC: 4.8 GM/DL — LOW (ref 6–8.3)
RBC # BLD: 3.18 M/UL — LOW (ref 4.2–5.8)
RBC # FLD: 13 % — SIGNIFICANT CHANGE UP (ref 10.3–14.5)
SODIUM SERPL-SCNC: 124 MMOL/L — LOW (ref 135–145)
SPECIMEN SOURCE: SIGNIFICANT CHANGE UP
WBC # BLD: 7.11 K/UL — SIGNIFICANT CHANGE UP (ref 3.8–10.5)
WBC # FLD AUTO: 7.11 K/UL — SIGNIFICANT CHANGE UP (ref 3.8–10.5)

## 2022-05-27 PROCEDURE — 99233 SBSQ HOSP IP/OBS HIGH 50: CPT

## 2022-05-27 PROCEDURE — 88305 TISSUE EXAM BY PATHOLOGIST: CPT | Mod: 26

## 2022-05-27 PROCEDURE — 49083 ABD PARACENTESIS W/IMAGING: CPT

## 2022-05-27 PROCEDURE — 88108 CYTOPATH CONCENTRATE TECH: CPT | Mod: 26

## 2022-05-27 RX ORDER — APIXABAN 2.5 MG/1
5 TABLET, FILM COATED ORAL EVERY 12 HOURS
Refills: 0 | Status: DISCONTINUED | OUTPATIENT
Start: 2022-05-27 | End: 2022-05-28

## 2022-05-27 RX ORDER — CHOLECALCIFEROL (VITAMIN D3) 125 MCG
2000 CAPSULE ORAL DAILY
Refills: 0 | Status: DISCONTINUED | OUTPATIENT
Start: 2022-05-27 | End: 2022-05-28

## 2022-05-27 RX ORDER — FUROSEMIDE 40 MG
80 TABLET ORAL
Refills: 0 | Status: DISCONTINUED | OUTPATIENT
Start: 2022-05-27 | End: 2022-05-27

## 2022-05-27 RX ORDER — ACETAMINOPHEN 500 MG
650 TABLET ORAL EVERY 6 HOURS
Refills: 0 | Status: DISCONTINUED | OUTPATIENT
Start: 2022-05-27 | End: 2022-05-27

## 2022-05-27 RX ORDER — POTASSIUM CHLORIDE 20 MEQ
40 PACKET (EA) ORAL EVERY 4 HOURS
Refills: 0 | Status: COMPLETED | OUTPATIENT
Start: 2022-05-27 | End: 2022-05-27

## 2022-05-27 RX ORDER — LANOLIN ALCOHOL/MO/W.PET/CERES
3 CREAM (GRAM) TOPICAL AT BEDTIME
Refills: 0 | Status: DISCONTINUED | OUTPATIENT
Start: 2022-05-27 | End: 2022-05-28

## 2022-05-27 RX ORDER — ONDANSETRON 8 MG/1
4 TABLET, FILM COATED ORAL EVERY 8 HOURS
Refills: 0 | Status: DISCONTINUED | OUTPATIENT
Start: 2022-05-27 | End: 2022-05-28

## 2022-05-27 RX ADMIN — Medication 40 MILLIEQUIVALENT(S): at 19:31

## 2022-05-27 RX ADMIN — Medication 40 MILLIEQUIVALENT(S): at 22:26

## 2022-05-27 RX ADMIN — APIXABAN 5 MILLIGRAM(S): 2.5 TABLET, FILM COATED ORAL at 22:26

## 2022-05-27 RX ADMIN — Medication 3 MILLIGRAM(S): at 22:26

## 2022-05-27 RX ADMIN — Medication 2000 UNIT(S): at 16:48

## 2022-05-27 NOTE — PROVIDER CONTACT NOTE (CRITICAL VALUE NOTIFICATION) - PERSON GIVING RESULT:
Carlos from the Lab Subsequent Stages Histo Method Verbiage: Using a similar technique to that described above, a thin layer of tissue was removed from all areas where tumor was visible on the previous stage.  The tissue was again oriented, mapped, dyed, and processed as above.

## 2022-05-27 NOTE — PROGRESS NOTE ADULT - ASSESSMENT
57M with EtOH cirrhosis and PV thrombus and refractory ascites, recent SBP, now with reaccumulated ascites despite diuretics and significant hyponatremia.    Rec:  -cont eliquis for PVT  -f/u paracentesis cell count to r/o SBP  -hold abx for now given normal WBC and afebrile  -continue to hold diuretics given hyponatremia and hypokalemia  -trend LFTs, lytes, INR  -d/w Dr. Butt re liver transplant candidacy or TIPS candidacy; he will accept patient for inpatient transfer to [place] to undergo transplant evaluation. Please contact transfer center in AM to start process.

## 2022-05-27 NOTE — PROGRESS NOTE ADULT - REASON FOR ADMISSION
Increased ascites, back, abdominal pain, and chills/subjective fever
Increased ascites, back, abdominal pain, and chills/subjective fever

## 2022-05-28 ENCOUNTER — TRANSCRIPTION ENCOUNTER (OUTPATIENT)
Age: 57
End: 2022-05-28

## 2022-05-28 ENCOUNTER — INPATIENT (INPATIENT)
Facility: HOSPITAL | Age: 57
LOS: 7 days | Discharge: HOME | DRG: 432 | End: 2022-06-05
Attending: TRANSPLANT SURGERY | Admitting: TRANSPLANT SURGERY
Payer: COMMERCIAL

## 2022-05-28 VITALS
HEART RATE: 71 BPM | HEIGHT: 74 IN | WEIGHT: 225.53 LBS | OXYGEN SATURATION: 97 % | HEIGHT: 74 IN | RESPIRATION RATE: 18 BRPM | OXYGEN SATURATION: 97 % | WEIGHT: 225.53 LBS | RESPIRATION RATE: 18 BRPM | DIASTOLIC BLOOD PRESSURE: 65 MMHG | SYSTOLIC BLOOD PRESSURE: 112 MMHG | TEMPERATURE: 98 F | TEMPERATURE: 98 F | SYSTOLIC BLOOD PRESSURE: 112 MMHG | DIASTOLIC BLOOD PRESSURE: 65 MMHG | HEART RATE: 71 BPM

## 2022-05-28 DIAGNOSIS — K72.90 HEPATIC FAILURE, UNSPECIFIED WITHOUT COMA: ICD-10-CM

## 2022-05-28 DIAGNOSIS — Z90.89 ACQUIRED ABSENCE OF OTHER ORGANS: Chronic | ICD-10-CM

## 2022-05-28 DIAGNOSIS — Z98.890 OTHER SPECIFIED POSTPROCEDURAL STATES: Chronic | ICD-10-CM

## 2022-05-28 LAB
ACANTHOCYTES BLD QL SMEAR: SLIGHT — SIGNIFICANT CHANGE UP
ALBUMIN SERPL ELPH-MCNC: 2.3 G/DL — LOW (ref 3.3–5)
ALBUMIN SERPL ELPH-MCNC: 2.8 G/DL — LOW (ref 3.3–5)
ALP SERPL-CCNC: 111 U/L — SIGNIFICANT CHANGE UP (ref 40–120)
ALP SERPL-CCNC: 87 U/L — SIGNIFICANT CHANGE UP (ref 40–120)
ALT FLD-CCNC: 29 U/L — SIGNIFICANT CHANGE UP (ref 10–45)
ALT FLD-CCNC: 33 U/L — SIGNIFICANT CHANGE UP (ref 12–78)
ANION GAP SERPL CALC-SCNC: 5 MMOL/L — SIGNIFICANT CHANGE UP (ref 5–17)
ANION GAP SERPL CALC-SCNC: 6 MMOL/L — SIGNIFICANT CHANGE UP (ref 5–17)
ANISOCYTOSIS BLD QL: SLIGHT — SIGNIFICANT CHANGE UP
AST SERPL-CCNC: 54 U/L — HIGH (ref 15–37)
AST SERPL-CCNC: 58 U/L — HIGH (ref 10–40)
B PERT IGG+IGM PNL SER: ABNORMAL
BASOPHILS # BLD AUTO: 0.16 K/UL — SIGNIFICANT CHANGE UP (ref 0–0.2)
BASOPHILS NFR BLD AUTO: 2.6 % — HIGH (ref 0–2)
BILIRUB DIRECT SERPL-MCNC: 1.2 MG/DL — HIGH (ref 0–0.3)
BILIRUB INDIRECT FLD-MCNC: 1.8 MG/DL — HIGH (ref 0.2–1)
BILIRUB SERPL-MCNC: 3 MG/DL — HIGH (ref 0.2–1.2)
BILIRUB SERPL-MCNC: 3 MG/DL — HIGH (ref 0.2–1.2)
BLD GP AB SCN SERPL QL: NEGATIVE — SIGNIFICANT CHANGE UP
BUN SERPL-MCNC: 23 MG/DL — SIGNIFICANT CHANGE UP (ref 7–23)
BUN SERPL-MCNC: 25 MG/DL — HIGH (ref 7–23)
BURR CELLS BLD QL SMEAR: PRESENT — SIGNIFICANT CHANGE UP
CALCIUM SERPL-MCNC: 7.7 MG/DL — LOW (ref 8.5–10.1)
CALCIUM SERPL-MCNC: 8.1 MG/DL — LOW (ref 8.4–10.5)
CHLORIDE SERPL-SCNC: 83 MMOL/L — LOW (ref 96–108)
CHLORIDE SERPL-SCNC: 85 MMOL/L — LOW (ref 96–108)
CO2 SERPL-SCNC: 31 MMOL/L — SIGNIFICANT CHANGE UP (ref 22–31)
CO2 SERPL-SCNC: 35 MMOL/L — HIGH (ref 22–31)
COLOR FLD: SIGNIFICANT CHANGE UP
CREAT SERPL-MCNC: 0.84 MG/DL — SIGNIFICANT CHANGE UP (ref 0.5–1.3)
CREAT SERPL-MCNC: 0.93 MG/DL — SIGNIFICANT CHANGE UP (ref 0.5–1.3)
DACRYOCYTES BLD QL SMEAR: SLIGHT — SIGNIFICANT CHANGE UP
EGFR: 100 ML/MIN/1.73M2 — SIGNIFICANT CHANGE UP
EGFR: 95 ML/MIN/1.73M2 — SIGNIFICANT CHANGE UP
EOSINOPHIL # BLD AUTO: 0.1 K/UL — SIGNIFICANT CHANGE UP (ref 0–0.5)
EOSINOPHIL # FLD: 0 % — SIGNIFICANT CHANGE UP
EOSINOPHIL NFR BLD AUTO: 1.7 % — SIGNIFICANT CHANGE UP (ref 0–6)
FLUID INTAKE SUBSTANCE CLASS: SIGNIFICANT CHANGE UP
FOLATE+VIT B12 SERBLD-IMP: 0 % — SIGNIFICANT CHANGE UP
GLUCOSE SERPL-MCNC: 122 MG/DL — HIGH (ref 70–99)
GLUCOSE SERPL-MCNC: 94 MG/DL — SIGNIFICANT CHANGE UP (ref 70–99)
HCT VFR BLD CALC: 29 % — LOW (ref 39–50)
HCT VFR BLD CALC: 32.8 % — LOW (ref 39–50)
HGB BLD-MCNC: 10.6 G/DL — LOW (ref 13–17)
HGB BLD-MCNC: 11.6 G/DL — LOW (ref 13–17)
INR BLD: 2.46 RATIO — HIGH (ref 0.88–1.16)
LYMPHOCYTES # BLD AUTO: 0.74 K/UL — LOW (ref 1–3.3)
LYMPHOCYTES # BLD AUTO: 12.2 % — LOW (ref 13–44)
LYMPHOCYTES # FLD: 4 % — SIGNIFICANT CHANGE UP
MACROCYTES BLD QL: SLIGHT — SIGNIFICANT CHANGE UP
MAGNESIUM SERPL-MCNC: 2.2 MG/DL — SIGNIFICANT CHANGE UP (ref 1.6–2.6)
MANUAL SMEAR VERIFICATION: SIGNIFICANT CHANGE UP
MCHC RBC-ENTMCNC: 33.3 PG — SIGNIFICANT CHANGE UP (ref 27–34)
MCHC RBC-ENTMCNC: 34 PG — SIGNIFICANT CHANGE UP (ref 27–34)
MCHC RBC-ENTMCNC: 35.4 GM/DL — SIGNIFICANT CHANGE UP (ref 32–36)
MCHC RBC-ENTMCNC: 36.6 GM/DL — HIGH (ref 32–36)
MCV RBC AUTO: 92.9 FL — SIGNIFICANT CHANGE UP (ref 80–100)
MCV RBC AUTO: 94.3 FL — SIGNIFICANT CHANGE UP (ref 80–100)
MESOTHL CELL # FLD: 0 % — SIGNIFICANT CHANGE UP
MONOCYTES # BLD AUTO: 0.69 K/UL — SIGNIFICANT CHANGE UP (ref 0–0.9)
MONOCYTES NFR BLD AUTO: 11.3 % — SIGNIFICANT CHANGE UP (ref 2–14)
MONOS+MACROS # FLD: 6 % — SIGNIFICANT CHANGE UP
NEUTROPHILS # BLD AUTO: 4.34 K/UL — SIGNIFICANT CHANGE UP (ref 1.8–7.4)
NEUTROPHILS NFR BLD AUTO: 71.3 % — SIGNIFICANT CHANGE UP (ref 43–77)
NEUTROPHILS-BODY FLUID: 90 % — SIGNIFICANT CHANGE UP
NRBC # FLD: 0 % — SIGNIFICANT CHANGE UP
OSMOLALITY UR: 678 MOS/KG — SIGNIFICANT CHANGE UP (ref 300–900)
OTHER CELLS FLD MANUAL: 0 % — SIGNIFICANT CHANGE UP
PHOSPHATE SERPL-MCNC: 2.2 MG/DL — LOW (ref 2.5–4.5)
PLAT MORPH BLD: NORMAL — SIGNIFICANT CHANGE UP
PLATELET # BLD AUTO: 59 K/UL — LOW (ref 150–400)
PLATELET # BLD AUTO: 72 K/UL — LOW (ref 150–400)
POIKILOCYTOSIS BLD QL AUTO: SLIGHT — SIGNIFICANT CHANGE UP
POTASSIUM SERPL-MCNC: 3.3 MMOL/L — LOW (ref 3.5–5.3)
POTASSIUM SERPL-MCNC: 3.6 MMOL/L — SIGNIFICANT CHANGE UP (ref 3.5–5.3)
POTASSIUM SERPL-SCNC: 3.3 MMOL/L — LOW (ref 3.5–5.3)
POTASSIUM SERPL-SCNC: 3.6 MMOL/L — SIGNIFICANT CHANGE UP (ref 3.5–5.3)
PROT SERPL-MCNC: 4.8 GM/DL — LOW (ref 6–8.3)
PROT SERPL-MCNC: 5.4 G/DL — LOW (ref 6–8.3)
PROTHROM AB SERPL-ACNC: 28.8 SEC — HIGH (ref 10.5–13.4)
RBC # BLD: 3.12 M/UL — LOW (ref 4.2–5.8)
RBC # BLD: 3.48 M/UL — LOW (ref 4.2–5.8)
RBC # FLD: 13 % — SIGNIFICANT CHANGE UP (ref 10.3–14.5)
RBC # FLD: 13 % — SIGNIFICANT CHANGE UP (ref 10.3–14.5)
RBC BLD AUTO: ABNORMAL
RCV VOL RI: HIGH /UL (ref 0–0)
RH IG SCN BLD-IMP: POSITIVE — SIGNIFICANT CHANGE UP
SARS-COV-2 RNA SPEC QL NAA+PROBE: SIGNIFICANT CHANGE UP
SODIUM SERPL-SCNC: 120 MMOL/L — CRITICAL LOW (ref 135–145)
SODIUM SERPL-SCNC: 125 MMOL/L — LOW (ref 135–145)
SODIUM UR-SCNC: 7 MMOL/L — SIGNIFICANT CHANGE UP
TOTAL NUCLEATED CELL COUNT, BODY FLUID: SIGNIFICANT CHANGE UP /UL
TUBE TYPE: SIGNIFICANT CHANGE UP
VARIANT LYMPHS # BLD: 0.9 % — SIGNIFICANT CHANGE UP (ref 0–6)
WBC # BLD: 6.08 K/UL — SIGNIFICANT CHANGE UP (ref 3.8–10.5)
WBC # BLD: 6.77 K/UL — SIGNIFICANT CHANGE UP (ref 3.8–10.5)
WBC # FLD AUTO: 6.08 K/UL — SIGNIFICANT CHANGE UP (ref 3.8–10.5)
WBC # FLD AUTO: 6.77 K/UL — SIGNIFICANT CHANGE UP (ref 3.8–10.5)

## 2022-05-28 PROCEDURE — 99239 HOSP IP/OBS DSCHRG MGMT >30: CPT

## 2022-05-28 RX ORDER — ALBUMIN HUMAN 25 %
100 VIAL (ML) INTRAVENOUS EVERY 6 HOURS
Refills: 0 | Status: DISCONTINUED | OUTPATIENT
Start: 2022-05-28 | End: 2022-06-03

## 2022-05-28 RX ORDER — UREA 15 G
15 POWDER IN PACKET (EA) ORAL
Refills: 0 | Status: DISCONTINUED | OUTPATIENT
Start: 2022-05-28 | End: 2022-06-03

## 2022-05-28 RX ORDER — CALCIUM GLUCONATE 100 MG/ML
2 VIAL (ML) INTRAVENOUS ONCE
Refills: 0 | Status: COMPLETED | OUTPATIENT
Start: 2022-05-28 | End: 2022-05-28

## 2022-05-28 RX ORDER — CIPROFLOXACIN LACTATE 400MG/40ML
500 VIAL (ML) INTRAVENOUS DAILY
Refills: 0 | Status: DISCONTINUED | OUTPATIENT
Start: 2022-05-28 | End: 2022-05-29

## 2022-05-28 RX ORDER — APIXABAN 2.5 MG/1
5 TABLET, FILM COATED ORAL
Refills: 0 | Status: DISCONTINUED | OUTPATIENT
Start: 2022-05-28 | End: 2022-05-29

## 2022-05-28 RX ORDER — CHOLECALCIFEROL (VITAMIN D3) 125 MCG
1 CAPSULE ORAL
Qty: 0 | Refills: 0 | DISCHARGE

## 2022-05-28 RX ORDER — LANOLIN ALCOHOL/MO/W.PET/CERES
1 CREAM (GRAM) TOPICAL
Qty: 0 | Refills: 0 | DISCHARGE
Start: 2022-05-28

## 2022-05-28 RX ORDER — POTASSIUM CHLORIDE 20 MEQ
40 PACKET (EA) ORAL ONCE
Refills: 0 | Status: COMPLETED | OUTPATIENT
Start: 2022-05-28 | End: 2022-05-28

## 2022-05-28 RX ORDER — EPLERENONE 50 MG/1
1 TABLET, FILM COATED ORAL
Qty: 0 | Refills: 0 | DISCHARGE

## 2022-05-28 RX ADMIN — Medication 15 GRAM(S): at 20:42

## 2022-05-28 RX ADMIN — Medication 63.75 MILLIMOLE(S): at 21:43

## 2022-05-28 RX ADMIN — Medication 500 MILLIGRAM(S): at 18:48

## 2022-05-28 RX ADMIN — APIXABAN 5 MILLIGRAM(S): 2.5 TABLET, FILM COATED ORAL at 09:38

## 2022-05-28 RX ADMIN — APIXABAN 5 MILLIGRAM(S): 2.5 TABLET, FILM COATED ORAL at 18:48

## 2022-05-28 RX ADMIN — Medication 2000 UNIT(S): at 09:39

## 2022-05-28 RX ADMIN — Medication 40 MILLIEQUIVALENT(S): at 12:08

## 2022-05-28 RX ADMIN — Medication 200 GRAM(S): at 20:55

## 2022-05-28 RX ADMIN — Medication 50 MILLILITER(S): at 19:01

## 2022-05-28 NOTE — DISCHARGE NOTE PROVIDER - NSDCFUSCHEDAPPT_GEN_ALL_CORE_FT
Indu Butt  [facility]  Hepatology [address] [place] [address]   Scheduled Appointment: 06/21/2022

## 2022-05-28 NOTE — DISCHARGE NOTE NURSING/CASE MANAGEMENT/SOCIAL WORK - NSDCPEFALRISK_GEN_ALL_CORE
For information on Fall & Injury Prevention, visit: [email or web address] OR  [email or web address] OR  https://www.cdc.gov/steadi/patient.html

## 2022-05-28 NOTE — H&P ADULT - ASSESSMENT
58 y/o M PMHx of decompensated ETOH cirrhosis (EV, L pleural effusin, PVT? with multiple readmission in the past. Transferred from [facility] for management of decompensated liver disease and liver transplant evaluation.     Decompensated ETOH Cirrhosis:  - Hyponatremia: Regular diet; 1L fluid restriction  - Albumin 25% q 6hrs  - Hold diuretics   - Ascites: s/p LVP ~ 6L drained on 5/27, neg for SBP  - PVT: continue Eliquis 5mg bid   - f/u bld cxs from OSH  - Cipro for SBP ppx  - Strict I&O  - SCDs/OOB

## 2022-05-28 NOTE — DISCHARGE NOTE PROVIDER - CARE PROVIDER_API CALL
Alverto Maya)  Gastroenterology; Internal Medicine  [address] [place] [address]  Phone: [phone]  Fax: [phone]  Follow Up Time: 1 week

## 2022-05-28 NOTE — H&P ADULT - HISTORY OF PRESENT ILLNESS
HPI: 58 y/o M PMHx of decompensated ETOH cirrhosis (EV, L pleural effusion, PVT). First decompensation in 2021.   Multiple admissions in the past:    -> Admitted from  - 2021: sent by PCP for increased abdominal distention, anasarca, shortness of breath, scleral icterus, and a 40 pound weight gain. CT a/p with partial portal vein thrombosis. Was started on Eliquis. Underwent LVP 7.3L removed     -> Admitted 12/10/12/14 with worsening ascites, bacteremia (Bld cx grew streptococcus salivarius/vestibularis group). TTE neg for vegetations.     -> Admitted  - 2022 with SBP (7.7L removed), tx with Ceftriaxone 2g     -> Re-admitted  at [place] with worsening back pain, abdominal pain, and distention associated with increasing ascites despite escalation of diuretics recently, underwent LVP  on  (6L drained) neg for SBP. Bld cxs () with NGTD. Transferred to [facility] for further management of decompensated liver disease.     Decompensated ETOH Cirrhosis,   ABO: A+, MELD NA 29  - last drink 10/31/2021.   - Drank beer (5 cans 12oz) daily x 30 years   - Currently enrolled in alcohol rehab program (about 2 months)  - Ascites: yes, + h/o SBP  - EV: 2021 + Grade 1 EV  - HE: none  - L pleural effusion  - PVT (dx 2021)     Imagin2021 CT a/p (w/IV) large asites, mod Left pleural effusion, Partial PVT (proximal portal vein)  2021 Liver doppler with ? PVT  2021 MRI - likely chronic PVT  2021 CT c/p - large ascites, b/l pleural effusion, PVT     GI:   EGD 2021 Grade 1 EV  Colonoscopy 3/31/2022 - no polyps     Home Meds:   Metolazone 2.5mg daily  Eplerenonoe 50mg bid  Lasix 80mg bid  Mag 400mg bid  Vitamin D-3 50mcg daily  Eliquis 5mg bid

## 2022-05-28 NOTE — DISCHARGE NOTE PROVIDER - NSDCMRMEDTOKEN_GEN_ALL_CORE_FT
Eliquis 5 mg oral tablet: 1 tab(s) orally 2 times a day  ***pt states med is on hold as of today as per provider***  magnesium aspartate: 400 milligram(s) orally 2 times a day  melatonin 3 mg oral tablet: 1 tab(s) orally once a day (at bedtime), As needed, Insomnia

## 2022-05-28 NOTE — H&P ADULT - NSHPSOCIALHISTORY_GEN_ALL_CORE
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

## 2022-05-28 NOTE — DISCHARGE NOTE PROVIDER - HOSPITAL COURSE
History of Present Illness:   58 y/o M PMHx of alcoholic cirrhosis (currently taking Lasix, Metolazone and Eplerenone) EtOH abuse (last drink was reportedly on 10/31/2021), esophageal varices, left pleural effusion, chronic partial portal vein thrombosis (on Apixaban) admitted from 5/7 - 5/12/2022 with decompensated alcoholic cirrhosis complicated by spontaneous bacterial peritonitis. The patient underwent a diagnostic and therapeutic tap of ~7.7L, and started on IV abx with Ceftriaxone 2g IVP daily. The patient's symptoms of abdominal distention and discomfort improved at that time, remained afebrile, with fluid analysis consistent with SBP.  The patient returns to the [facility] for further evaluation and management of subjective chills/fever, worsening back pain, abdominal pain, and distention associated with increasing ascites despite escalation of diuretics recently. In the ED GI and Interventional Radiology were consulted.  Labs => PLT 94, INR 2.18, NH3 64, Na 126, K 3.1, Cl 85, HCO3 32, Glu 173, TPro 5.7, Alb 2.6, TBili 3.2, AST 72. In the ED the patient was given Potassium chloride 40mEq po x 1    5.27: s/p paracentesis 6000cc drained, no abd pain            REVIEW OF SYSTEMS:    CONSTITUTIONAL: No weakness, No fevers or chills  ENT: No ear ache, No sorethroat  NECK: No pain, No stiffness  RESPIRATORY: No cough, No wheezing, No hemoptysis; No dyspnea  CARDIOVASCULAR: No chest pain, No palpitations  GASTROINTESTINAL: No abd pain, No nausea, No vomiting, No hematemesis, No diarrhea or constipation. No melena, No hematochezia.  GENITOURINARY: No dysuria, No  hematuria  NEUROLOGICAL: No diplopia, No paresthesia, No motor dysfunction  MUSCULOSKELETAL: No arthralgia, No myalgia  SKIN: No rashes, or lesions   PSYCH: no anxiety, no suicidal ideation    All other review of systems is negative unless indicated above    Vital Signs Last 24 Hrs  T(C): 37.3 (27 May 2022 07:53), Max: 37.3 (27 May 2022 07:53)  T(F): 99.2 (27 May 2022 07:53), Max: 99.2 (27 May 2022 07:53)  HR: 80 (27 May 2022 07:53) (79 - 80)  BP: 102/61 (27 May 2022 07:53) (100/66 - 133/72)  BP(mean): --  RR: 18 (27 May 2022 07:53) (18 - 18)  SpO2: 96% (27 May 2022 07:53) (96% - 100%)    PHYSICAL EXAM:    GENERAL: NAD  HEENT:  NC/AT, EOMI, PERRLA, No scleral icterus, Moist mucous membranes  NECK: Supple, No JVD  CNS:  Alert & Oriented X3, Motor Strength 5/5 B/L upper and lower extremities; DTRs 2+ intact   LUNG: Normal Breath sounds, Clear to auscultation bilaterally, No rales, No rhonchi, No wheezing  HEART: RRR; No murmurs, No rubs  ABDOMEN: +BS, ST/NT, distended   GENITOURINARY: Voiding, Bladder not distended  EXTREMITIES:  2+ Peripheral Pulses, No clubbing, No cyanosis, No tibial edema  MUSCULOSKELTAL: Joints normal ROM, No TTP, No effusion  SKIN: no rashes  RECTAL: deferred, not indicated  BREAST: deferred      a/p:    1. Large Ascites due to Etoh cirrhosis:   s/p paracentesis, 6000cc drained  f/u cell count, f/u fluid culture   no clinical signs of SBP on fluid analysis     will transfer to [place] for liver transplant evaluation     2. Hyponatremia:   likely due to cirrhosis and diuretic  diuretic on hold for  now    3. Portal vein thrombosis  c/w Apixaban

## 2022-05-28 NOTE — DISCHARGE NOTE NURSING/CASE MANAGEMENT/SOCIAL WORK - PATIENT PORTAL LINK FT
You can access the FollowMyHealth Patient Portal offered by [facility] by registering at the following website: [email or web address]. By joining [employer]’s FollowMyHealth portal, you will also be able to view your health information using other applications (apps) compatible with our system.

## 2022-05-28 NOTE — PATIENT PROFILE ADULT - FALL HARM RISK - HARM RISK INTERVENTIONS

## 2022-05-28 NOTE — H&P ADULT - NSHPPHYSICALEXAM_GEN_ALL_CORE
Constitutional: Well developed / well nourished  Eyes: Anicteric, PERRLA  ENMT: nc/at  Neck: supple  Respiratory: CTA B/L  Cardiovascular: RRR  Gastrointestinal: distended, + ascites, soft, non tender, + umbilical hernia   Genitourinary: Voiding spontaneously  Extremities: SCD's in place and working bilaterally  Vascular: Palpable dp pulses bilaterally  Neurological: A&O x3  Musculoskeletal: Moving all extremities  Psychiatric: Responsive

## 2022-05-28 NOTE — H&P ADULT - NSHPREVIEWOFSYSTEMS_GEN_ALL_CORE
Gen: + weight gain   Skin: No rashes  Head/Eyes/Ears/Mouth: No headache; Normal hearing; Normal vision w/o blurriness; No sinus pain/discomfort, sore throat  Respiratory: No dyspnea, cough, wheezing, hemoptysis  CV: No chest pain, PND, orthopnea  GI: abdominal distention   : No increased frequency, dysuria, hematuria, nocturia  MSK: lower extrem swelling   Neuro: No dizziness/lightheadedness, weakness, seizures, numbness, tingling  Heme: No easy bruising or bleeding  Endo: No heat/cold intolerance  Psych: No significant nervousness, anxiety, stress, depression  All other systems were reviewed and are negative, except as noted.

## 2022-05-28 NOTE — DISCHARGE NOTE PROVIDER - NSDCCPCAREPLAN_GEN_ALL_CORE_FT
PRINCIPAL DISCHARGE DIAGNOSIS  Diagnosis: Ascites  Assessment and Plan of Treatment: Etoh liver failure/cirrhosis      SECONDARY DISCHARGE DIAGNOSES  Diagnosis: Acute hyponatremia  Assessment and Plan of Treatment:     Diagnosis: Elevated bilirubin  Assessment and Plan of Treatment:

## 2022-05-29 LAB
ALBUMIN SERPL ELPH-MCNC: 2.7 G/DL — LOW (ref 3.3–5)
ALBUMIN SERPL ELPH-MCNC: 3.3 G/DL — SIGNIFICANT CHANGE UP (ref 3.3–5)
ALP SERPL-CCNC: 81 U/L — SIGNIFICANT CHANGE UP (ref 40–120)
ALP SERPL-CCNC: 86 U/L — SIGNIFICANT CHANGE UP (ref 40–120)
ALT FLD-CCNC: 21 U/L — SIGNIFICANT CHANGE UP (ref 10–45)
ALT FLD-CCNC: 23 U/L — SIGNIFICANT CHANGE UP (ref 10–45)
ANION GAP SERPL CALC-SCNC: 8 MMOL/L — SIGNIFICANT CHANGE UP (ref 5–17)
ANION GAP SERPL CALC-SCNC: 9 MMOL/L — SIGNIFICANT CHANGE UP (ref 5–17)
AST SERPL-CCNC: 46 U/L — HIGH (ref 10–40)
AST SERPL-CCNC: 47 U/L — HIGH (ref 10–40)
BILIRUB SERPL-MCNC: 2.6 MG/DL — HIGH (ref 0.2–1.2)
BILIRUB SERPL-MCNC: 3.4 MG/DL — HIGH (ref 0.2–1.2)
BUN SERPL-MCNC: 29 MG/DL — HIGH (ref 7–23)
BUN SERPL-MCNC: 40 MG/DL — HIGH (ref 7–23)
CALCIUM SERPL-MCNC: 8.1 MG/DL — LOW (ref 8.4–10.5)
CALCIUM SERPL-MCNC: 8.6 MG/DL — SIGNIFICANT CHANGE UP (ref 8.4–10.5)
CHLORIDE SERPL-SCNC: 86 MMOL/L — LOW (ref 96–108)
CHLORIDE SERPL-SCNC: 86 MMOL/L — LOW (ref 96–108)
CO2 SERPL-SCNC: 29 MMOL/L — SIGNIFICANT CHANGE UP (ref 22–31)
CO2 SERPL-SCNC: 30 MMOL/L — SIGNIFICANT CHANGE UP (ref 22–31)
CREAT SERPL-MCNC: 0.86 MG/DL — SIGNIFICANT CHANGE UP (ref 0.5–1.3)
CREAT SERPL-MCNC: 0.91 MG/DL — SIGNIFICANT CHANGE UP (ref 0.5–1.3)
EGFR: 100 ML/MIN/1.73M2 — SIGNIFICANT CHANGE UP
EGFR: 97 ML/MIN/1.73M2 — SIGNIFICANT CHANGE UP
GLUCOSE SERPL-MCNC: 115 MG/DL — HIGH (ref 70–99)
GLUCOSE SERPL-MCNC: 93 MG/DL — SIGNIFICANT CHANGE UP (ref 70–99)
HCT VFR BLD CALC: 28 % — LOW (ref 39–50)
HGB BLD-MCNC: 10 G/DL — LOW (ref 13–17)
MAGNESIUM SERPL-MCNC: 2.1 MG/DL — SIGNIFICANT CHANGE UP (ref 1.6–2.6)
MCHC RBC-ENTMCNC: 33.6 PG — SIGNIFICANT CHANGE UP (ref 27–34)
MCHC RBC-ENTMCNC: 35.7 GM/DL — SIGNIFICANT CHANGE UP (ref 32–36)
MCV RBC AUTO: 94 FL — SIGNIFICANT CHANGE UP (ref 80–100)
NRBC # BLD: 0 /100 WBCS — SIGNIFICANT CHANGE UP (ref 0–0)
PHOSPHATE SERPL-MCNC: 3.1 MG/DL — SIGNIFICANT CHANGE UP (ref 2.5–4.5)
PLATELET # BLD AUTO: 53 K/UL — LOW (ref 150–400)
POTASSIUM SERPL-MCNC: 3.4 MMOL/L — LOW (ref 3.5–5.3)
POTASSIUM SERPL-MCNC: 3.5 MMOL/L — SIGNIFICANT CHANGE UP (ref 3.5–5.3)
POTASSIUM SERPL-SCNC: 3.4 MMOL/L — LOW (ref 3.5–5.3)
POTASSIUM SERPL-SCNC: 3.5 MMOL/L — SIGNIFICANT CHANGE UP (ref 3.5–5.3)
PROT SERPL-MCNC: 4.8 G/DL — LOW (ref 6–8.3)
PROT SERPL-MCNC: 5.5 G/DL — LOW (ref 6–8.3)
RBC # BLD: 2.98 M/UL — LOW (ref 4.2–5.8)
RBC # FLD: 12.9 % — SIGNIFICANT CHANGE UP (ref 10.3–14.5)
SODIUM SERPL-SCNC: 123 MMOL/L — LOW (ref 135–145)
SODIUM SERPL-SCNC: 125 MMOL/L — LOW (ref 135–145)
WBC # BLD: 4.37 K/UL — SIGNIFICANT CHANGE UP (ref 3.8–10.5)
WBC # FLD AUTO: 4.37 K/UL — SIGNIFICANT CHANGE UP (ref 3.8–10.5)

## 2022-05-29 PROCEDURE — 74177 CT ABD & PELVIS W/CONTRAST: CPT | Mod: 26

## 2022-05-29 PROCEDURE — 99222 1ST HOSP IP/OBS MODERATE 55: CPT

## 2022-05-29 RX ORDER — PIPERACILLIN AND TAZOBACTAM 4; .5 G/20ML; G/20ML
3.38 INJECTION, POWDER, LYOPHILIZED, FOR SOLUTION INTRAVENOUS ONCE
Refills: 0 | Status: COMPLETED | OUTPATIENT
Start: 2022-05-29 | End: 2022-05-29

## 2022-05-29 RX ORDER — PIPERACILLIN AND TAZOBACTAM 4; .5 G/20ML; G/20ML
3.38 INJECTION, POWDER, LYOPHILIZED, FOR SOLUTION INTRAVENOUS EVERY 8 HOURS
Refills: 0 | Status: DISCONTINUED | OUTPATIENT
Start: 2022-05-29 | End: 2022-06-03

## 2022-05-29 RX ORDER — ENOXAPARIN SODIUM 100 MG/ML
80 INJECTION SUBCUTANEOUS
Refills: 0 | Status: DISCONTINUED | OUTPATIENT
Start: 2022-05-29 | End: 2022-05-30

## 2022-05-29 RX ADMIN — Medication 50 MILLILITER(S): at 09:13

## 2022-05-29 RX ADMIN — PIPERACILLIN AND TAZOBACTAM 25 GRAM(S): 4; .5 INJECTION, POWDER, LYOPHILIZED, FOR SOLUTION INTRAVENOUS at 20:15

## 2022-05-29 RX ADMIN — Medication 50 MILLILITER(S): at 03:43

## 2022-05-29 RX ADMIN — APIXABAN 5 MILLIGRAM(S): 2.5 TABLET, FILM COATED ORAL at 05:53

## 2022-05-29 RX ADMIN — PIPERACILLIN AND TAZOBACTAM 25 GRAM(S): 4; .5 INJECTION, POWDER, LYOPHILIZED, FOR SOLUTION INTRAVENOUS at 12:18

## 2022-05-29 RX ADMIN — Medication 50 MILLILITER(S): at 20:45

## 2022-05-29 RX ADMIN — ENOXAPARIN SODIUM 80 MILLIGRAM(S): 100 INJECTION SUBCUTANEOUS at 20:18

## 2022-05-29 RX ADMIN — Medication 15 GRAM(S): at 05:54

## 2022-05-29 RX ADMIN — Medication 15 GRAM(S): at 17:50

## 2022-05-29 RX ADMIN — Medication 50 MILLILITER(S): at 16:22

## 2022-05-29 RX ADMIN — PIPERACILLIN AND TAZOBACTAM 200 GRAM(S): 4; .5 INJECTION, POWDER, LYOPHILIZED, FOR SOLUTION INTRAVENOUS at 08:23

## 2022-05-29 NOTE — DIETITIAN INITIAL EVALUATION ADULT - NS FNS REASON FOR WEIGHT CHANG
see wt trend above/fluid loss/fluid retention see wt trend above; pt with hx of ascites, s/p paracentesis. wt fluctuations expected./fluid loss/fluid retention

## 2022-05-29 NOTE — DIETITIAN INITIAL EVALUATION ADULT - PERTINENT LABORATORY DATA
05-29    123<L>  |  86<L>  |  29<H>  ----------------------------<  93  3.4<L>   |  29  |  0.86    Ca    8.1<L>      29 May 2022 06:14  Phos  3.1     05-29  Mg     2.1     05-29    TPro  4.8<L>  /  Alb  2.7<L>  /  TBili  2.6<H>  /  DBili  x   /  AST  46<H>  /  ALT  21  /  AlkPhos  81  05-29

## 2022-05-29 NOTE — DIETITIAN INITIAL EVALUATION ADULT - ORAL INTAKE PTA/DIET HISTORY
Pt reports good appetite PTA; consumes on average three meals daily and enjoys a variety of different foods. Typically follows a low sodium diet at home; does not add salt with meals. Denies food allergies, denies tolerance to chewing/swallowing. At baseline, takes vitamin D and Mg. Denies N/V/C/diarrhea.

## 2022-05-29 NOTE — PROGRESS NOTE ADULT - ASSESSMENT
56 y/o M PMHx of decompensated ETOH cirrhosis (EV, L pleural effusin, PVT? with multiple readmission in the past. Transferred from [facility] for management of decompensated liver disease and liver transplant evaluation.     Decompensated ETOH Cirrhosis:  - Hyponatremia: Regular diet; 1L fluid restriction  - Albumin 25% q 6hrs, urea 15g bid  - Hold diuretics   - Ascites: s/p LVP ~ 6L drained on 5/27, neg for SBP  - PVT: continue Eliquis 5mg bid   - f/u bld cxs from OSH  - Cipro for SBP ppx  - Strict I&O  - SCDs/OOB  - CT AP w/wo contrast today  - Change cipro to zosyn for SBP ppx   58 y/o M PMHx of decompensated ETOH cirrhosis (EV, L pleural effusin, PVT? with multiple readmission in the past. Transferred from [facility] for management of decompensated liver disease and liver transplant evaluation.     Decompensated ETOH Cirrhosis:  - Hyponatremia: Regular diet; 1L fluid restriction  - Albumin 25% q 6hrs, urea 15g bid  - Hold diuretics   - Ascites: s/p LVP ~ 6L drained on 5/27, neg for SBP  - PVT: continue Eliquis 5mg bid   - f/u bld cxs from OSH  - Strict I&O  - SCDs/OOB  - CT AP w/wo contrast today  - Change cipro to zosyn for SBP ppx

## 2022-05-29 NOTE — PHYSICAL THERAPY INITIAL EVALUATION ADULT - PERTINENT HX OF CURRENT PROBLEM, REHAB EVAL
56 y/o M PMHx of decompensated ETOH cirrhosis, Ascites (EV, L pleural effusin, PVT? with multiple readmission in the past. Admitted to [facility] on 5/26 with worsening back pain, abdominal pain, and distention associated with increasing ascites despite escalation of diuretics recently, underwent LVP  on 5/27 (6L drained) neg for SBP. Bld cxs (5/26) with NGTD. Transferred to [facility] for further management of decompensated liver disease

## 2022-05-29 NOTE — DIETITIAN INITIAL EVALUATION ADULT - PERTINENT MEDS FT
MEDICATIONS  (STANDING):  albumin human 25% IVPB 100 milliLiter(s) IV Intermittent every 6 hours  enoxaparin Injectable 80 milliGRAM(s) SubCutaneous <User Schedule>  piperacillin/tazobactam IVPB.. 3.375 Gram(s) IV Intermittent every 8 hours  urea Oral Powder 15 Gram(s) Oral two times a day    MEDICATIONS  (PRN):

## 2022-05-29 NOTE — DIETITIAN INITIAL EVALUATION ADULT - ADD RECOMMEND
1. Recommend to continue PO diet as ordered; consider addition of low sodium diet. Defer consistency to medical team, SLP. Fluid restriction deferred to team.  2. Recommend multivitamin, thiamine and folic acid (if no medication contraindications).   3. Encourage and monitor PO intake. [employer] dietary preferences as expressed as able.  4. Monitor wt trends/labs/skin integrity/hydration status/bowel regularity. Consider bowel regimen to aid in bowel regularity.  5. Recommend Ensure Pudding 2x daily.

## 2022-05-29 NOTE — PHYSICAL THERAPY INITIAL EVALUATION ADULT - ADDITIONAL COMMENTS
As per the pt, he lives in a pvt house w/ family, 2-3STE, and +stairs to bedroom, PTA, Pt stated he was Independent in all functional/Community mobility and BADLs/IADLs. +driving and working. No DMEs used prior at home.

## 2022-05-29 NOTE — CONSULT NOTE ADULT - SUBJECTIVE AND OBJECTIVE BOX
Chief Complaint:      HPI:    Last Colonoscopy:  Last Endoscopy:    Allergies:  No Known Allergies        Hospital Medications:  albumin human 25% IVPB 100 milliLiter(s) IV Intermittent every 6 hours  apixaban 5 milliGRAM(s) Oral two times a day  ciprofloxacin     Tablet 500 milliGRAM(s) Oral daily  piperacillin/tazobactam IVPB. 3.375 Gram(s) IV Intermittent once  piperacillin/tazobactam IVPB.. 3.375 Gram(s) IV Intermittent every 8 hours  urea Oral Powder 15 Gram(s) Oral two times a day      PMHX/PSHX:  Fatty liver    ETOH abuse    Alcoholic fatty liver    Thrombocytopenia concurrent with and due to alcoholism    Cirrhosis of liver with ascites    S/P abdominal paracentesis    History of tonsillectomy and adenoidectomy        Family history:  No pertinent family history in first degree relatives        Social History: no smoking    ROS:   General:  No fevers, chills or night sweats  ENT:  No sore throat or dysphagia  CV:  No pain or palpitations  Resp:  No dyspnea, cough or  wheezing  GI:  as above  Skin:  No rash or edema  Neuro: no weakness   Hematologic: no bleeding  Musculoskeletal: no muscle pain or join pain  Psych: no agitation     : no dysuria      PHYSICAL EXAM:   GENERAL:  NAD, Appears stated age  HEENT:  NC/AT,  conjunctivae clear and pink, sclera -anicteric  CHEST:  CTA B/L, Normal effort  HEART:  RRR S1/S2,  ABDOMEN:  Soft, non-tender, non-distended,  no masses   EXTREMITIES:  No cyanosis or Edema  SKIN:  Warm & Dry. No rash or erythema  NEURO:  Alert, oriented, no focal deficit    Vital Signs:  Vital Signs Last 24 Hrs  T(C): 36.8 (29 May 2022 05:40), Max: 37.4 (28 May 2022 15:54)  T(F): 98.3 (29 May 2022 05:40), Max: 99.3 (28 May 2022 15:54)  HR: 73 (29 May 2022 05:40) (71 - 85)  BP: 100/56 (29 May 2022 05:40) (96/51 - 117/68)  BP(mean): 72 (29 May 2022 05:40) (66 - 72)  RR: 18 (29 May 2022 05:40) (17 - 18)  SpO2: 92% (29 May 2022 05:40) (92% - 97%)  Daily Height in cm: 187.96 (28 May 2022 16:58)    Daily Weight in k.6 (29 May 2022 05:40)    LABS:                        10.0   4.37  )-----------( 53       ( 29 May 2022 06:14 )             28.0     Mean Cell Volume: 94.0 fl (- @ 06:14)        123<L>  |  86<L>  |  29<H>  ----------------------------<  93  3.4<L>   |  29  |  0.86    Ca    8.1<L>      29 May 2022 06:14  Phos  3.1       Mg     2.1         TPro  4.8<L>  /  Alb  2.7<L>  /  TBili  2.6<H>  /  DBili  x   /  AST  46<H>  /  ALT  21  /  AlkPhos  81  29    LIVER FUNCTIONS - ( 29 May 2022 06:14 )  Alb: 2.7 g/dL / Pro: 4.8 g/dL / ALK PHOS: 81 U/L / ALT: 21 U/L / AST: 46 U/L / GGT: x           PT/INR - ( 28 May 2022 07:36 )   PT: 28.8 sec;   INR: 2.46 ratio                                     10.0   4.37  )-----------( 53       ( 29 May 2022 06:14 )             28.0                         11.6   6.08  )-----------( 72       ( 28 May 2022 18:47 )             32.8                         10.6   6.77  )-----------( 59       ( 28 May 2022 07:36 )             29.0                         10.6   7.11  )-----------( 60       ( 27 May 2022 18:12 )             29.7                         13.1   3.96  )-----------( 94       ( 26 May 2022 12:39 )             37.8     Imaging:     HPI:  HPI: 56 y/o M PMHx of decompensated ETOH cirrhosis (EV, L pleural effusion, PVT). First decompensation in 2021.   Multiple admissions in the past:    -> Admitted from  - 2021: sent by PCP for increased abdominal distention, anasarca, shortness of breath, scleral icterus, and a 40 pound weight gain. CT a/p with partial portal vein thrombosis. Was started on Eliquis. Underwent LVP 7.3L removed     -> Admitted 12/10/12/14 with worsening ascites, bacteremia (Bld cx grew streptococcus salivarius/vestibularis group). TTE neg for vegetations.     -> Admitted  - 2022 with SBP (7.7L removed), tx with Ceftriaxone 2g     -> Re-admitted  at [facility] with worsening back pain, abdominal pain, and distention associated with increasing ascites despite escalation of diuretics recently, underwent LVP  on  (6L drained) neg for SBP. Bld cxs () with NGTD. Transferred to [facility] for further management of decompensated liver disease.       Imagin2021 CT a/p (w/IV) large asites, mod Left pleural effusion, Partial PVT (proximal portal vein)  2021 Liver doppler with ? PVT  2021 MRI - likely chronic PVT  2021 CT c/p - large ascites, b/l pleural effusion, PVT     GI:   EGD 2021 Grade 1 EV  Colonoscopy 3/31/2022 - no polyps     Home Meds:   Metolazone 2.5mg daily  Eplerenonoe 50mg bid  Lasix 80mg bid  Mag 400mg bid  Vitamin D-3 50mcg daily  Eliquis 5mg bid  (28 May 2022 17:06)      Allergies:  No Known Allergies        Hospital Medications:  albumin human 25% IVPB 100 milliLiter(s) IV Intermittent every 6 hours  apixaban 5 milliGRAM(s) Oral two times a day  ciprofloxacin     Tablet 500 milliGRAM(s) Oral daily  piperacillin/tazobactam IVPB. 3.375 Gram(s) IV Intermittent once  piperacillin/tazobactam IVPB.. 3.375 Gram(s) IV Intermittent every 8 hours  urea Oral Powder 15 Gram(s) Oral two times a day      PMHX/PSHX:  Fatty liver    ETOH abuse    Alcoholic fatty liver    Thrombocytopenia concurrent with and due to alcoholism    Cirrhosis of liver with ascites    S/P abdominal paracentesis    History of tonsillectomy and adenoidectomy        Family history:  No pertinent family history in first degree relatives        Social History: no smoking    ROS:   General:  No fevers, chills or night sweats  ENT:  No sore throat or dysphagia  CV:  No pain or palpitations  Resp:  No dyspnea, cough or  wheezing  GI:  as above  Skin:  No rash or edema  Neuro: no weakness   Hematologic: no bleeding  Musculoskeletal: no muscle pain or join pain  Psych: no agitation     : no dysuria      PHYSICAL EXAM:   GENERAL:  NAD, Appears stated age  HEENT:  NC/AT,  conjunctivae clear and pink, sclera -anicteric  CHEST:  CTA B/L, Normal effort  HEART:  RRR S1/S2,  ABDOMEN:  Soft, distended  EXTREMITIES:  No cyanosis, +1 edema  SKIN:  Warm & Dry. No rash or erythema  NEURO:  Alert, oriented, no focal deficit    Vital Signs:  Vital Signs Last 24 Hrs  T(C): 36.8 (29 May 2022 05:40), Max: 37.4 (28 May 2022 15:54)  T(F): 98.3 (29 May 2022 05:40), Max: 99.3 (28 May 2022 15:54)  HR: 73 (29 May 2022 05:40) (71 - 85)  BP: 100/56 (29 May 2022 05:40) (96/51 - 117/68)  BP(mean): 72 (29 May 2022 05:40) (66 - 72)  RR: 18 (29 May 2022 05:40) (17 - 18)  SpO2: 92% (29 May 2022 05:40) (92% - 97%)  Daily Height in cm: 187.96 (28 May 2022 16:58)    Daily Weight in k.6 (29 May 2022 05:40)    LABS:                        10.0   4.37  )-----------( 53       ( 29 May 2022 06:14 )             28.0     Mean Cell Volume: 94.0 fl (-22 @ 06:14)        123<L>  |  86<L>  |  29<H>  ----------------------------<  93  3.4<L>   |  29  |  0.86    Ca    8.1<L>      29 May 2022 06:14  Phos  3.1       Mg     2.1         TPro  4.8<L>  /  Alb  2.7<L>  /  TBili  2.6<H>  /  DBili  x   /  AST  46<H>  /  ALT  21  /  AlkPhos  81      LIVER FUNCTIONS - ( 29 May 2022 06:14 )  Alb: 2.7 g/dL / Pro: 4.8 g/dL / ALK PHOS: 81 U/L / ALT: 21 U/L / AST: 46 U/L / GGT: x           PT/INR - ( 28 May 2022 07:36 )   PT: 28.8 sec;   INR: 2.46 ratio                                     10.0   4.37  )-----------( 53       ( 29 May 2022 06:14 )             28.0                         11.6   6.08  )-----------( 72       ( 28 May 2022 18:47 )             32.8                         10.6   6.77  )-----------( 59       ( 28 May 2022 07:36 )             29.0                         10.6   7.11  )-----------( 60       ( 27 May 2022 18:12 )             29.7                         13.1   3.96  )-----------( 94       ( 26 May 2022 12:39 )             37.8     Imaging:     HPI:  HPI: 56 y/o M PMHx of decompensated ETOH cirrhosis (EV, L pleural effusion, PVT). First decompensation in 2021.   Multiple admissions in the past:    -> Admitted from  - 2021: sent by PCP for increased abdominal distention, anasarca, shortness of breath, scleral icterus, and a 40 pound weight gain. CT a/p with partial portal vein thrombosis. Was started on Eliquis. Underwent LVP 7.3L removed     -> Admitted 12/10/12/14 with worsening ascites, bacteremia (Bld cx grew streptococcus salivarius/vestibularis group). TTE neg for vegetations.     -> Admitted  - 2022 with SBP (7.7L removed), tx with Ceftriaxone 2g     -> Re-admitted  at [facility] with worsening back pain, abdominal pain, and distention associated with increasing ascites despite escalation of diuretics recently, underwent LVP  on  (6L drained) neg for SBP. Bld cxs () with NGTD. Transferred to [facility] for further management of decompensated liver disease.       Imagin2021 CT a/p (w/IV) large asites, mod Left pleural effusion, Partial PVT (proximal portal vein)  2021 Liver doppler with ? PVT  2021 MRI - likely chronic PVT  2021 CT c/p - large ascites, b/l pleural effusion, PVT     GI:   EGD 2021 Grade 1 EV  Colonoscopy 3/31/2022 - no polyps     Home Meds:   Metolazone 2.5mg daily  Eplerenonoe 50mg bid  Lasix 80mg bid  Mag 400mg bid  Vitamin D-3 50mcg daily  Eliquis 5mg bid  (28 May 2022 17:06)      Allergies:  No Known Allergies        Hospital Medications:  albumin human 25% IVPB 100 milliLiter(s) IV Intermittent every 6 hours  apixaban 5 milliGRAM(s) Oral two times a day  ciprofloxacin     Tablet 500 milliGRAM(s) Oral daily  piperacillin/tazobactam IVPB. 3.375 Gram(s) IV Intermittent once  piperacillin/tazobactam IVPB.. 3.375 Gram(s) IV Intermittent every 8 hours  urea Oral Powder 15 Gram(s) Oral two times a day      PMHX/PSHX:  Fatty liver    ETOH abuse    Alcoholic fatty liver    Thrombocytopenia concurrent with and due to alcoholism    Cirrhosis of liver with ascites    S/P abdominal paracentesis    History of tonsillectomy and adenoidectomy        Family history:  No pertinent family history in first degree relatives        Social History: no smoking    ROS:   General:  No fevers, chills or night sweats  ENT:  No sore throat or dysphagia  CV:  No pain or palpitations  Resp:  No dyspnea, cough or  wheezing  GI:  as above  Skin:  No rash or edema  Neuro: no weakness   Hematologic: no bleeding  Musculoskeletal: no muscle pain or join pain  Psych: no agitation     : no dysuria      PHYSICAL EXAM:   GENERAL:  NAD, Appears stated age  HEENT:  NC/AT,  conjunctivae clear and pink, sclera icteric  CHEST:  CTA B/L, Normal effort  HEART:  RRR S1/S2, no JVD  ABDOMEN:  Soft, distended, non-tense, +BS, NTTP  EXTREMITIES:  No cyanosis, +1 edema  SKIN:  Warm & Dry. No rash or erythema  NEURO:  Alert, oriented, no focal deficit, no asterixis    Vital Signs:  Vital Signs Last 24 Hrs  T(C): 36.8 (29 May 2022 05:40), Max: 37.4 (28 May 2022 15:54)  T(F): 98.3 (29 May 2022 05:40), Max: 99.3 (28 May 2022 15:54)  HR: 73 (29 May 2022 05:40) (71 - 85)  BP: 100/56 (29 May 2022 05:40) (96/51 - 117/68)  BP(mean): 72 (29 May 2022 05:40) (66 - 72)  RR: 18 (29 May 2022 05:40) (17 - 18)  SpO2: 92% (29 May 2022 05:40) (92% - 97%)  Daily Height in cm: 187.96 (28 May 2022 16:58)    Daily Weight in k.6 (29 May 2022 05:40)    LABS:                        10.0   4.37  )-----------( 53       ( 29 May 2022 06:14 )             28.0     Mean Cell Volume: 94.0 fl (-22 @ 06:14)        123<L>  |  86<L>  |  29<H>  ----------------------------<  93  3.4<L>   |  29  |  0.86    Ca    8.1<L>      29 May 2022 06:14  Phos  3.1       Mg     2.1         TPro  4.8<L>  /  Alb  2.7<L>  /  TBili  2.6<H>  /  DBili  x   /  AST  46<H>  /  ALT  21  /  AlkPhos  81  29    LIVER FUNCTIONS - ( 29 May 2022 06:14 )  Alb: 2.7 g/dL / Pro: 4.8 g/dL / ALK PHOS: 81 U/L / ALT: 21 U/L / AST: 46 U/L / GGT: x           PT/INR - ( 28 May 2022 07:36 )   PT: 28.8 sec;   INR: 2.46 ratio                                     10.0   4.37  )-----------( 53       ( 29 May 2022 06:14 )             28.0                         11.6   6.08  )-----------( 72       ( 28 May 2022 18:47 )             32.8                         10.6   6.77  )-----------( 59       ( 28 May 2022 07:36 )             29.0                         10.6   7.11  )-----------( 60       ( 27 May 2022 18:12 )             29.7                         13.1   3.96  )-----------( 94       ( 26 May 2022 12:39 )             37.8     Imaging:

## 2022-05-29 NOTE — DIETITIAN INITIAL EVALUATION ADULT - EDUCATION DIETARY MODIFICATIONS
pt educated on fluid restriction; reinforced importance of low Na (pt aware)./(2) meets goals/outcomes

## 2022-05-29 NOTE — CONSULT NOTE ADULT - ASSESSMENT
56 y/o M PMHx of decompensated ETOH cirrhosis (EV, L pleural effusion, PVT)  admitted 5/26 at [place] with worsening back pain, abdominal pain, and distention associated with increasing ascites despite escalation of diuretics recently. Transferred to [facility] for further management of decompensated liver disease.         Decompensated ETOH Cirrhosis,   ABO: A+, MELD NA 29  - last drink 10/31/2021.   - Drank beer (5 cans 12oz) daily x 30 years   - Currently enrolled in alcohol rehab program (about 2 months)  - Ascites: yes, + h/o SBP  - EV: 12/13/2021 + Grade 1 EV  - HE: none  - L pleural effusion  - PVT (dx 11/24/2021)    56 y/o M PMHx of decompensated ETOH cirrhosis (EV, L pleural effusion, PVT)  admitted 5/26 at [place] with worsening back pain, abdominal pain, and distention associated with increasing ascites despite escalation of diuretics recently. Transferred to [facility] for further management of decompensated liver disease. +SBP with culture-negative neutrocytic ascites.         Decompensated ETOH Cirrhosis,   ABO: A+, MELD NA 29  - last drink 10/31/2021.   - Drank beer (5 cans 12oz) daily x 30 years   - Currently enrolled in alcohol rehab program (about 2 months)  - Ascites: yes, +SBP  - EV: 12/13/2021 + Grade 1 EV  - HE: none  - L pleural effusion  - PVT (dx 11/24/2021)    56 y/o M PMHx of decompensated ETOH cirrhosis (EV, L pleural effusion, PVT)  admitted 5/26 at [place] with worsening back pain, abdominal pain, and distention associated with increasing ascites despite escalation of diuretics recently. Transferred to [facility] for further management of decompensated liver disease. +SBP with culture-negative neutrocytic ascites.     #Decompensated ETOH Cirrhosis,   MELD 28  - last drink 10/31/2021.   - Drank beer (5 cans 12oz) daily x 30 years   - Currently enrolled in alcohol rehab program (about 2 months)  - Ascites: yes, not on diuretics due to degree of low sodium  - EV: 12/13/2021 + Grade 1 EV. Not on BB due to SBP  - HE: none  - L pleural effusion  - PVT (dx 11/24/2021), repeat CT pending    #Left hydrothorax  #Acute hyponatremia:   #Peritonitis, SBP.  Dx para from may 27 with TNC 64861, 90 neutrophils. On zosyn from 5/29    Recommendations:  -Obtain CT scan with IV contrast to assess PV thrombus and intraabdominal fluid collection   -c/w Albumin 25%100 cc q6h  -c/w urea 15 g po bid  -BMP q12H  -daily CMP and INR  -Plan for repeat paracentesis on 5/31 to assess response to abx  -Continue AC with lovenox  -low salt diet    Recommendations preliminary until signed by attending.     Enrique Lyons MD  Gastroenterology/Hepatology Fellow  1st option: [phone] (text or call), ONLY available from 7:00 am to 5:00 pm.   **Contact on-call GI fellow via answering service ([phone]) from 5pm-7am AND on weekends/holidays**  2nd option: Available via Microsoft Teams  3rd option: Pager: [phone]

## 2022-05-29 NOTE — CONSULT NOTE ADULT - ATTENDING COMMENTS
58 yo M with AUD (with last reported drink 10/31/21) and decompensated ALD cirrhosis (first diagnosed in 11/2021) and complicated by refractory ascites (with last LVP on 5/27 with 6.65L removed), recurrent SBP (with ascitic fluid from 5/27 with >9k PMNs), left hepatic hydrothorax, hyponatremia, non-bleeding EV (with small EV on EGD on 12/13/21), and non-occlusive PVT (on anticoagulation with apixaban), transferred from [facility] for a higher level of care.    # Peritonitis: Given concern for nosocomial infection, will start Zosyn today. Given very high ascitic fluid neutrophil count, will obtain CT abdomen/pelvis with contrast (liver protocol) to rule out secondary peritonitis. He will also need repeat paracentesis on 5/31 to assess for response to antibiotic therapy. Blood cultures (5/26 x2) with NGTD. Continue IV albumin, as below.    # Refractory ascites and left hepatic hydrothorax: Currently holding diuretics due to acute hyponatremia. On room air. Defer thoracentesis for now. Repeat paracentesis on 5/31, as above.    # Acute hyponatremia: Continuing management with oral fluid restriction to 1.2L/day, albumin 25% 100 mL iv q6h, and urea 15g po bid. Goal correction ~6 mEq/24h. Monitor BMP q12h to avoid overcorrection. Given Uosm >500, may need to increase dose of urea if hyponatremia is not continuing to improve.    # Non-bleeding EV: Small on prior EGD (12/13/21). Poor candidate for NSBB currently given peritonitis and refractory ascites.    # Non-occlusive PVT: Will follow-up repeat CT as above also re: clot burden. Will switch from apixaban to therapeutic LMWH today to enable necessary procedures such as repeat paracentesis.    # Decompensated ALD cirrhosis: ABO A with MELD-Na 28 today. He is a potential transplant candidate but per transplant financial services would have a 20% copay for transplant services and post-transplant care if transplanted here at [facility] because our center does not yet have [facility]. He would not have the 20% copay if transplanted at a [facility] facility. This was discussed with Mr. Joinerm today at bedside and he is interested in transfer to a different transplant center where he would not have the 20% copay and could undergo expedited transplant evaluation. We will reach out to [facility] re: inpatient transfer.    Please don't hesitate to call with any questions or concerns.    Gurjit Omalley M.D., Ph.D.  Transplant Hepatology

## 2022-05-29 NOTE — PHYSICAL THERAPY INITIAL EVALUATION ADULT - PRECAUTIONS/LIMITATIONS, REHAB EVAL
cont.. 58 y/o M PMHx of decompensated ETOH cirrhosis, Ascites (EV, L pleural effusin, PVT? with multiple readmission in the past. Admitted to [facility] on 5/26 with worsening back pain, abdominal pain, and distention associated with increasing ascites despite escalation of diuretics recently, underwent LVP  on 5/27 (6L drained) neg for SBP. Bld cxs (5/26) with NGTD. Transferred to [facility] for further management of decompensated liver disease

## 2022-05-29 NOTE — DIETITIAN INITIAL EVALUATION ADULT - NSFNSGIIOFT_GEN_A_CORE
-No documented BM's recorded thus far. Not on apparent bowel regimen.   -Continues on antibiotics as ordered; Albumin human 25% IVPB. Prescribed urea oral powder in setting of low Na.

## 2022-05-29 NOTE — PROGRESS NOTE ADULT - SUBJECTIVE AND OBJECTIVE BOX
Transplant Surgery - Multidisciplinary Rounds  --------------------------------------------------------------  DDRT / LDRT POD#    Present:  Patient seen with multidisciplinary team including Transplant Surgeon: Dr. Rush. Hepatologist Dr. Omalley hepatologu fellow, ACP's: Roya/Terrence, and bedside RN in am rounds and examined with Dr. Rush. Disciplines not in attendance will be notified of the plan.     HPI:   58 y/o M PMHx of decompensated ETOH cirrhosis (EV, L pleural effusion, PVT). First decompensation in 2021.   Multiple admissions in the past:    -> Admitted from  - 2021: sent by PCP for increased abdominal distention, anasarca, shortness of breath, scleral icterus, and a 40 pound weight gain. CT a/p with partial portal vein thrombosis. Was started on Eliquis. Underwent LVP 7.3L removed     -> Admitted 12/10/12/14 with worsening ascites, bacteremia (Bld cx grew streptococcus salivarius/vestibularis group). TTE neg for vegetations.     -> Admitted  - 2022 with SBP (7.7L removed), tx with Ceftriaxone 2g     -> Re-admitted  at [place] with worsening back pain, abdominal pain, and distention associated with increasing ascites despite escalation of diuretics recently, underwent LVP  on  (6L drained) neg for SBP. Bld cxs () with NGTD. Transferred to [facility] for further management of decompensated liver disease.     Decompensated ETOH Cirrhosis,   ABO: A+, MELD NA 29  - last drink 10/31/2021.   - Drank beer (5 cans 12oz) daily x 30 years   - Currently enrolled in alcohol rehab program (about 2 months)  - Ascites: yes, + h/o SBP  - EV: 2021 + Grade 1 EV  - HE: none  - L pleural effusion  - PVT (dx 2021)     Imagin2021 CT a/p (w/IV) large asites, mod Left pleural effusion, Partial PVT (proximal portal vein)  2021 Liver doppler with ? PVT  2021 MRI - likely chronic PVT  2021 CT c/p - large ascites, b/l pleural effusion, PVT     GI:   EGD 2021 Grade 1 EV  Colonoscopy 3/31/2022 - no polyps     Home Meds:   Metolazone 2.5mg daily  Eplerenonoe 50mg bid  Lasix 80mg bid  Mag 400mg bid  Vitamin D-3 50mcg daily  Eliquis 5mg bid     Interval Events:  -MELD 29  -Transferred from OSH for management of decompensated liver disease  -Hyponatremia placed on fluid restriction 1L, urea 15g bid, albumin 25% Q6      Potential Discharge date: pending clinical improvement  Education:  Medications  Plan of care:  See Below    MEDICATIONS  (STANDING):  albumin human 25% IVPB 100 milliLiter(s) IV Intermittent every 6 hours  apixaban 5 milliGRAM(s) Oral two times a day  ciprofloxacin     Tablet 500 milliGRAM(s) Oral daily  piperacillin/tazobactam IVPB.. 3.375 Gram(s) IV Intermittent every 8 hours  urea Oral Powder 15 Gram(s) Oral two times a day    MEDICATIONS  (PRN):      PAST MEDICAL & SURGICAL HISTORY:  ETOH abuse  Alcoholic fatty liver  Thrombocytopenia concurrent with and due to alcoholism  Cirrhosis of liver with ascites  S/P abdominal paracentesis  History of tonsillectomy and adenoidectomy as child    Vital Signs Last 24 Hrs  T(C): 36.9 (29 May 2022 09:35), Max: 37.4 (28 May 2022 15:54)  T(F): 98.5 (29 May 2022 09:35), Max: 99.3 (28 May 2022 15:54)  HR: 76 (29 May 2022 09:35) (71 - 85)  BP: 93/58 (29 May 2022 09:35) (93/58 - 117/68)  BP(mean): 72 (29 May 2022 05:40) (66 - 72)  RR: 18 (29 May 2022 09:35) (17 - 18)  SpO2: 93% (29 May 2022 09:35) (92% - 97%)    I&O's Summary    28 May 2022 07:01  -  29 May 2022 07:00  --------------------------------------------------------  IN: 240 mL / OUT: 0 mL / NET: 240 mL    29 May 2022 07:01  -  29 May 2022 10:36  --------------------------------------------------------  IN: 120 mL / OUT: 0 mL / NET: 120 mL                        10.0   4.37  )-----------( 53       ( 29 May 2022 06:14 )             28.0         123<L>  |  86<L>  |  29<H>  ----------------------------<  93  3.4<L>   |  29  |  0.86    Ca    8.1<L>      29 May 2022 06:14  Phos  3.1       Mg     2.1         TPro  4.8<L>  /  Alb  2.7<L>  /  TBili  2.6<H>  /  DBili  x   /  AST  46<H>  /  ALT  21  /  AlkPhos  81          REVIEW OF SYSTEMS  --------------------------------------------------------------------------------  Gen: No weight changes, fatigue, fevers/chills, weakness  Skin: No rashes  Head/Eyes/Ears/Mouth: No headache; Normal hearing; Normal vision w/o blurriness; No sinus pain/discomfort, sore throat  Respiratory: No dyspnea, cough, wheezing, hemoptysis  CV: No chest pain, PND, orthopnea  GI: No abdominal pain, diarrhea, constipation, nausea, vomiting, melena, hematochezia  : No increased frequency, dysuria, hematuria, nocturia  MSK: No joint pain/swelling; no back pain; no edema  Neuro: No dizziness/lightheadedness, weakness, seizures, numbness, tingling  Heme: No easy bruising or bleeding  Endo: No heat/cold intolerance  Psych: No significant nervousness, anxiety, stress, depression  All other systems were reviewed and are negative, except as noted.      PHYSICAL EXAM:  Constitutional: Well developed / well nourished  Eyes: icteric, PERRLA  ENMT: nc/at  Neck: Supple  Respiratory: CTA B/L  Cardiovascular: RRR  Gastrointestinal: Soft abdomen, NT, Distended  Genitourinary:  Voiding spontaneously/   Extremities: SCD's in place and working bilaterally  Vascular: Palpable dp pulses bilaterally  Neurological: A&O x3  Skin: jaundiced   Musculoskeletal: Moving all extremities  Psychiatric: Responsive

## 2022-05-29 NOTE — DIETITIAN INITIAL EVALUATION ADULT - REASON INDICATOR FOR ASSESSMENT
Nutrition Assessment warranted for length of stay.  Information obtained from: RN, comprehensive chart review, interdisciplinary medical rounds

## 2022-05-29 NOTE — DIETITIAN INITIAL EVALUATION ADULT - OTHER INFO
Dosing wt (5/28): 226.1 lbs. UBW ~220 lbs  Wt trend (per [facility]): (5/8): 252.7 lbs(4/29): 242 lbs; (3/31): 210.2 lbs; (2/17): 210 lbs; (12/11/21): 267 lbs. Pt with hx of wt flucuations likely in setting of fluid shifts, hx of ascites s/p paracentesis.   IBW: 190 lbs

## 2022-05-29 NOTE — DIETITIAN INITIAL EVALUATION ADULT - REASON FOR ADMISSION
Pt is a 58 yo M with PMH: decompensated ETOH cirrhosis (EV, L pleural effusion, PVT). First decomposition in 11/2021. Multiple past admissions in past. Readmitted to OSH with worsening back pain, abdominal pain and distention, associated with increasing ascites despite escalation of diuretics recently. Underwent LVP on 5/27 (6L drained). Transferred to [facility] 5/28 for further management of decompensated liver disease. Meld Na 29. Last drink 10/31/21. Noted with hyponatremia; placed on 1L fluid restriction.

## 2022-05-30 LAB
ALBUMIN SERPL ELPH-MCNC: 3.4 G/DL — SIGNIFICANT CHANGE UP (ref 3.3–5)
ALP SERPL-CCNC: 78 U/L — SIGNIFICANT CHANGE UP (ref 40–120)
ALT FLD-CCNC: 20 U/L — SIGNIFICANT CHANGE UP (ref 10–45)
ANION GAP SERPL CALC-SCNC: 9 MMOL/L — SIGNIFICANT CHANGE UP (ref 5–17)
AST SERPL-CCNC: 41 U/L — HIGH (ref 10–40)
BILIRUB SERPL-MCNC: 3 MG/DL — HIGH (ref 0.2–1.2)
BILIRUB SERPL-MCNC: 3 MG/DL — HIGH (ref 0.2–1.2)
BLD GP AB SCN SERPL QL: NEGATIVE — SIGNIFICANT CHANGE UP
BUN SERPL-MCNC: 28 MG/DL — HIGH (ref 7–23)
CALCIUM SERPL-MCNC: 8 MG/DL — LOW (ref 8.4–10.5)
CHLORIDE SERPL-SCNC: 89 MMOL/L — LOW (ref 96–108)
CO2 SERPL-SCNC: 31 MMOL/L — SIGNIFICANT CHANGE UP (ref 22–31)
CREAT SERPL-MCNC: 0.88 MG/DL — SIGNIFICANT CHANGE UP (ref 0.5–1.3)
CREAT SERPL-MCNC: 0.89 MG/DL — SIGNIFICANT CHANGE UP (ref 0.5–1.3)
EGFR: 99 ML/MIN/1.73M2 — SIGNIFICANT CHANGE UP
EGFR: 99 ML/MIN/1.73M2 — SIGNIFICANT CHANGE UP
GLUCOSE SERPL-MCNC: 98 MG/DL — SIGNIFICANT CHANGE UP (ref 70–99)
HCT VFR BLD CALC: 27.5 % — LOW (ref 39–50)
HGB BLD-MCNC: 9.9 G/DL — LOW (ref 13–17)
INR BLD: 2.01 RATIO — HIGH (ref 0.88–1.16)
MAGNESIUM SERPL-MCNC: 2.2 MG/DL — SIGNIFICANT CHANGE UP (ref 1.6–2.6)
MCHC RBC-ENTMCNC: 33.9 PG — SIGNIFICANT CHANGE UP (ref 27–34)
MCHC RBC-ENTMCNC: 36 GM/DL — SIGNIFICANT CHANGE UP (ref 32–36)
MCV RBC AUTO: 94.2 FL — SIGNIFICANT CHANGE UP (ref 80–100)
MELD SCORE WITH DIALYSIS: 34 POINTS — SIGNIFICANT CHANGE UP
MELD SCORE WITHOUT DIALYSIS: 23 POINTS — SIGNIFICANT CHANGE UP
NRBC # BLD: 0 /100 WBCS — SIGNIFICANT CHANGE UP (ref 0–0)
PHOSPHATE SERPL-MCNC: 3.4 MG/DL — SIGNIFICANT CHANGE UP (ref 2.5–4.5)
PLATELET # BLD AUTO: 58 K/UL — LOW (ref 150–400)
POTASSIUM SERPL-MCNC: 3.3 MMOL/L — LOW (ref 3.5–5.3)
POTASSIUM SERPL-SCNC: 3.3 MMOL/L — LOW (ref 3.5–5.3)
PROT SERPL-MCNC: 5.3 G/DL — LOW (ref 6–8.3)
PROTHROM AB SERPL-ACNC: 23.3 SEC — HIGH (ref 10.5–13.4)
RBC # BLD: 2.92 M/UL — LOW (ref 4.2–5.8)
RBC # FLD: 12.9 % — SIGNIFICANT CHANGE UP (ref 10.3–14.5)
RH IG SCN BLD-IMP: POSITIVE — SIGNIFICANT CHANGE UP
SARS-COV-2 RNA SPEC QL NAA+PROBE: SIGNIFICANT CHANGE UP
SODIUM SERPL-SCNC: 129 MMOL/L — LOW (ref 135–145)
SODIUM SERPL-SCNC: 130 MMOL/L — LOW (ref 135–145)
WBC # BLD: 3.2 K/UL — LOW (ref 3.8–10.5)
WBC # FLD AUTO: 3.2 K/UL — LOW (ref 3.8–10.5)

## 2022-05-30 PROCEDURE — 99232 SBSQ HOSP IP/OBS MODERATE 35: CPT | Mod: GC

## 2022-05-30 RX ORDER — ENOXAPARIN SODIUM 100 MG/ML
100 INJECTION SUBCUTANEOUS
Refills: 0 | Status: DISCONTINUED | OUTPATIENT
Start: 2022-05-30 | End: 2022-06-02

## 2022-05-30 RX ADMIN — Medication 50 MILLILITER(S): at 21:50

## 2022-05-30 RX ADMIN — ENOXAPARIN SODIUM 80 MILLIGRAM(S): 100 INJECTION SUBCUTANEOUS at 08:41

## 2022-05-30 RX ADMIN — PIPERACILLIN AND TAZOBACTAM 25 GRAM(S): 4; .5 INJECTION, POWDER, LYOPHILIZED, FOR SOLUTION INTRAVENOUS at 21:22

## 2022-05-30 RX ADMIN — PIPERACILLIN AND TAZOBACTAM 25 GRAM(S): 4; .5 INJECTION, POWDER, LYOPHILIZED, FOR SOLUTION INTRAVENOUS at 03:21

## 2022-05-30 RX ADMIN — Medication 50 MILLILITER(S): at 15:37

## 2022-05-30 RX ADMIN — Medication 15 GRAM(S): at 05:59

## 2022-05-30 RX ADMIN — Medication 50 MILLILITER(S): at 03:21

## 2022-05-30 RX ADMIN — Medication 50 MILLILITER(S): at 08:41

## 2022-05-30 RX ADMIN — ENOXAPARIN SODIUM 100 MILLIGRAM(S): 100 INJECTION SUBCUTANEOUS at 21:50

## 2022-05-30 RX ADMIN — PIPERACILLIN AND TAZOBACTAM 25 GRAM(S): 4; .5 INJECTION, POWDER, LYOPHILIZED, FOR SOLUTION INTRAVENOUS at 11:30

## 2022-05-30 RX ADMIN — Medication 15 GRAM(S): at 17:38

## 2022-05-30 NOTE — CONSULT NOTE ADULT - SUBJECTIVE AND OBJECTIVE BOX
Vascular & Interventional Radiology Brief Consult Note    Evaluate for Procedure: Paracentesis    HPI: 57y Male with decompensated ETOH cirrhosis here for management of decompensated liver disease and liver transplant evaluation. Patient with abdominal distension and ascites, IR consulted for para.    Allergies:   Medications (Abx/Cardiac/Anticoagulation/Blood Products)  apixaban: 5 milliGRAM(s) Oral (05-29 @ 05:53)  ciprofloxacin     Tablet: 500 milliGRAM(s) Oral (05-28 @ 18:48)  enoxaparin Injectable: 80 milliGRAM(s) SubCutaneous (05-30 @ 08:41)  piperacillin/tazobactam IVPB.: 200 mL/Hr IV Intermittent (05-29 @ 08:23)  piperacillin/tazobactam IVPB..: 25 mL/Hr IV Intermittent (05-30 @ 11:30)  urea Oral Powder: 15 Gram(s) Oral (05-30 @ 05:59)    Data:    T(C): 37.1  HR: 77  BP: 117/60  RR: 18  SpO2: 95%    -WBC 3.20 / HgB 9.9 / Hct 27.5 / Plt 58  -Na 130 / Cl 89 / BUN 28 / Glucose 98  -K 3.3 / CO2 31 / Cr 0.88  -ALT 20 / Alk Phos 78 / T.Bili 3.0  -INR2.01    Imaging: Reviewed.

## 2022-05-30 NOTE — PROGRESS NOTE ADULT - ASSESSMENT
56 y/o M PMHx of decompensated ETOH cirrhosis (EV, L pleural effusin, PVT? with multiple readmission in the past. Transferred from [facility] for management of decompensated liver disease and liver transplant evaluation.     Decompensated ETOH Cirrhosis:  - Hyponatremia: Regular diet; 1L fluid restriction  - Albumin 25% q 6hrs, urea 15g bid  - Hold diuretics   - Ascites: s/p LVP ~ 6L drained on 5/27, neg for SBP, plan for IR para in am   - PVT: continue full AC with lovenox 100mg BID  - f/u bld cxs from OSH  - Strict I&O  - SCDs/OOB  - CT AP w/wo contrast today  - Change cipro to zosyn for SBP ppx  - Pt accepted at [facility] for transplant eval/listing pending bed availability

## 2022-05-30 NOTE — PROGRESS NOTE ADULT - ASSESSMENT
56 y/o M PMHx of decompensated ETOH cirrhosis (EV, L pleural effusion, PVT)  admitted 5/26 at [place] with worsening back pain, abdominal pain, and distention associated with increasing ascites despite escalation of diuretics recently. Transferred to [facility] for further management of decompensated liver disease. +SBP with culture-negative neutrocytic ascites.     #Decompensated ETOH Cirrhosis,   MELD 28  - last drink 10/31/2021.   - Drank beer (5 cans 12oz) daily x 30 years   - Currently enrolled in alcohol rehab program (about 2 months)  - Ascites: yes, not on diuretics due to degree of low sodium  - EV: 12/13/2021 + Grade 1 EV. Not on BB due to SBP  - HE: none  - L pleural effusion  - PVT (dx 11/24/2021), repeat CT pending    #Left hydrothorax  #Acute hyponatremia:   #Peritonitis, SBP.  Dx para from may 27 with TNC 44312, 90 neutrophils. On zosyn from 5/29-  CT abd/pelvis w/ IV contrast with no intraabdominal process causing peritonitis    Recommendations:  -c/w Albumin 25%100 cc q6h  -c/w urea 15 g po bid  -BMP q12H  -daily CMP and INR  -Plan for repeat paracentesis on 5/31 to assess response to abx  -Continue AC with lovenox  -low salt diet  -Discussed with [facility], was pending CT results prior accepting patient. Should be able to be transfer now.    Recommendations preliminary until signed by attending.     Enrique Lyons MD  Gastroenterology/Hepatology Fellow  1st option: [phone] (text or call), ONLY available from 7:00 am to 5:00 pm.   **Contact on-call GI fellow via answering service ([phone]) from 5pm-7am AND on weekends/holidays**  2nd option: Available via Microsoft Teams  3rd option: Pager: [phone]

## 2022-05-30 NOTE — PROGRESS NOTE ADULT - SUBJECTIVE AND OBJECTIVE BOX
Interval Events:   INR dwon to 2  sodium continues to improve, now 130      Hospital Medications:  albumin human 25% IVPB 100 milliLiter(s) IV Intermittent every 6 hours  enoxaparin Injectable 80 milliGRAM(s) SubCutaneous <User Schedule>  piperacillin/tazobactam IVPB.. 3.375 Gram(s) IV Intermittent every 8 hours  urea Oral Powder 15 Gram(s) Oral two times a day      ROS: All system reviewed and negative except as mentioned above.    PHYSICAL EXAM:   Vital Signs:  Vital Signs Last 24 Hrs  T(C): 36.8 (30 May 2022 05:00), Max: 37.2 (30 May 2022 01:05)  T(F): 98.3 (30 May 2022 05:00), Max: 98.9 (30 May 2022 01:05)  HR: 78 (30 May 2022 05:00) (71 - 94)  BP: 102/59 (30 May 2022 05:00) (93/58 - 116/55)  BP(mean): 74 (30 May 2022 05:00) (74 - 79)  RR: 18 (30 May 2022 05:00) (18 - 18)  SpO2: 95% (30 May 2022 05:00) (93% - 97%)  Daily     Daily Weight in k.9 (30 May 2022 05:00)    GENERAL:  NAD, Appears stated age  HEENT:  NC/AT,  conjunctivae clear and pink, sclera icteric  CHEST:  CTA B/L, Normal effort  HEART:  RRR S1/S2, no JVD  ABDOMEN:  Soft, distended, non-tense, +BS, NTTP  EXTREMITIES:  No cyanosis, +1 edema  SKIN:  Warm & Dry. No rash or erythema  NEURO:  Alert, oriented, no focal deficit, no asterixis    LABS:                        9.9    3.20  )-----------( 58       ( 30 May 2022 06:48 )             27.5     Mean Cell Volume: 94.2 fl (-22 @ 06:48)        130<L>  |  89<L>  |  28<H>  ----------------------------<  98  3.3<L>   |  31  |  0.88    Ca    8.0<L>      30 May 2022 06:47  Phos  3.4     05-30  Mg     2.2     05-30    TPro  5.3<L>  /  Alb  3.4  /  TBili  3.0<H>  /  DBili  x   /  AST  41<H>  /  ALT  20  /  AlkPhos  78  05-30    LIVER FUNCTIONS - ( 30 May 2022 06:47 )  Alb: 3.4 g/dL / Pro: 5.3 g/dL / ALK PHOS: 78 U/L / ALT: 20 U/L / AST: 41 U/L / GGT: x           PT/INR - ( 30 May 2022 06:47 )   PT: 23.3 sec;   INR: 2.01 ratio                                     9.9    3.20  )-----------( 58       ( 30 May 2022 06:48 )             27.5                         10.0   4.37  )-----------( 53       ( 29 May 2022 06:14 )             28.0                         11.6   6.08  )-----------( 72       ( 28 May 2022 18:47 )             32.8                         10.6   6.77  )-----------( 59       ( 28 May 2022 07:36 )             29.0                         10.6   7.11  )-----------( 60       ( 27 May 2022 18:12 )             29.7       Imaging: Images reviewed.  IMPRESSION:  Cirrhosis and portal hypertension without evidence for HCC.  Moderate to large ascites.  Short segment nonvisualization versus narrowing of the main portal vein   may be related to chronic thrombosis.       Interval Events:   INR down to 2  sodium continues to improve, now 130      Hospital Medications:  albumin human 25% IVPB 100 milliLiter(s) IV Intermittent every 6 hours  enoxaparin Injectable 80 milliGRAM(s) SubCutaneous <User Schedule>  piperacillin/tazobactam IVPB.. 3.375 Gram(s) IV Intermittent every 8 hours  urea Oral Powder 15 Gram(s) Oral two times a day      ROS: All system reviewed and negative except as mentioned above.    PHYSICAL EXAM:   Vital Signs:  Vital Signs Last 24 Hrs  T(C): 36.8 (30 May 2022 05:00), Max: 37.2 (30 May 2022 01:05)  T(F): 98.3 (30 May 2022 05:00), Max: 98.9 (30 May 2022 01:05)  HR: 78 (30 May 2022 05:00) (71 - 94)  BP: 102/59 (30 May 2022 05:00) (93/58 - 116/55)  BP(mean): 74 (30 May 2022 05:00) (74 - 79)  RR: 18 (30 May 2022 05:00) (18 - 18)  SpO2: 95% (30 May 2022 05:00) (93% - 97%)  Daily     Daily Weight in k.9 (30 May 2022 05:00)    GENERAL:  NAD, Appears stated age  HEENT:  NC/AT,  conjunctivae clear and pink, sclera icteric  CHEST:  CTA B/L, Normal effort  HEART:  RRR S1/S2, no JVD  ABDOMEN:  Soft, distended, non-tense, +BS, NTTP  EXTREMITIES:  No cyanosis, +1 edema  SKIN:  Warm & Dry. No rash or erythema  NEURO:  Alert, oriented, no focal deficit, no asterixis    LABS:                        9.9    3.20  )-----------( 58       ( 30 May 2022 06:48 )             27.5     Mean Cell Volume: 94.2 fl (-22 @ 06:48)        130<L>  |  89<L>  |  28<H>  ----------------------------<  98  3.3<L>   |  31  |  0.88    Ca    8.0<L>      30 May 2022 06:47  Phos  3.4     05-30  Mg     2.2     05-30    TPro  5.3<L>  /  Alb  3.4  /  TBili  3.0<H>  /  DBili  x   /  AST  41<H>  /  ALT  20  /  AlkPhos  78  05-30    LIVER FUNCTIONS - ( 30 May 2022 06:47 )  Alb: 3.4 g/dL / Pro: 5.3 g/dL / ALK PHOS: 78 U/L / ALT: 20 U/L / AST: 41 U/L / GGT: x           PT/INR - ( 30 May 2022 06:47 )   PT: 23.3 sec;   INR: 2.01 ratio                                     9.9    3.20  )-----------( 58       ( 30 May 2022 06:48 )             27.5                         10.0   4.37  )-----------( 53       ( 29 May 2022 06:14 )             28.0                         11.6   6.08  )-----------( 72       ( 28 May 2022 18:47 )             32.8                         10.6   6.77  )-----------( 59       ( 28 May 2022 07:36 )             29.0                         10.6   7.11  )-----------( 60       ( 27 May 2022 18:12 )             29.7       Imaging: Images reviewed.  IMPRESSION:  Cirrhosis and portal hypertension without evidence for HCC.  Moderate to large ascites.  Short segment nonvisualization versus narrowing of the main portal vein   may be related to chronic thrombosis.

## 2022-05-30 NOTE — CONSULT NOTE ADULT - ASSESSMENT
Assessment/Plan:   57y Male with decompensated ETOH cirrhosis here for management of decompensated liver disease and liver transplant evaluation. Patient with abdominal distension and ascites, IR consulted for para.  - Plan for paracentesis Wednesday 6/1  - Place IR procedure order under Dr. Montalvo  - d/w primary team

## 2022-05-30 NOTE — PROGRESS NOTE ADULT - SUBJECTIVE AND OBJECTIVE BOX
Transplant Surgery - Multidisciplinary Rounds  --------------------------------------------------------------  DDRT / LDRT POD#    Present:  Patient seen with multidisciplinary team including Transplant Surgeon: Dr. Rush. Hepatologist Dr. Omalley hepatologu fellow, ACP's: Roya/Terrence, and bedside RN in am rounds and examined with Dr. Rush. Disciplines not in attendance will be notified of the plan.     HPI:   58 y/o M PMHx of decompensated ETOH cirrhosis (EV, L pleural effusion, PVT). First decompensation in 2021.   Multiple admissions in the past:    -> Admitted from  - 2021: sent by PCP for increased abdominal distention, anasarca, shortness of breath, scleral icterus, and a 40 pound weight gain. CT a/p with partial portal vein thrombosis. Was started on Eliquis. Underwent LVP 7.3L removed     -> Admitted 12/10/12/14 with worsening ascites, bacteremia (Bld cx grew streptococcus salivarius/vestibularis group). TTE neg for vegetations.     -> Admitted  - 2022 with SBP (7.7L removed), tx with Ceftriaxone 2g     -> Re-admitted  at [place] with worsening back pain, abdominal pain, and distention associated with increasing ascites despite escalation of diuretics recently, underwent LVP  on  (6L drained) neg for SBP. Bld cxs () with NGTD. Transferred to [facility] for further management of decompensated liver disease.     Decompensated ETOH Cirrhosis,   ABO: A+, MELD NA 29  - last drink 10/31/2021.   - Drank beer (5 cans 12oz) daily x 30 years   - Currently enrolled in alcohol rehab program (about 2 months)  - Ascites: yes, + h/o SBP  - EV: 2021 + Grade 1 EV  - HE: none  - L pleural effusion  - PVT (dx 2021)     Imagin2021 CT a/p (w/IV) large asites, mod Left pleural effusion, Partial PVT (proximal portal vein)  2021 Liver doppler with ? PVT  2021 MRI - likely chronic PVT  2021 CT c/p - large ascites, b/l pleural effusion, PVT     GI:   EGD 2021 Grade 1 EV  Colonoscopy 3/31/2022 - no polyps     Home Meds:   Metolazone 2.5mg daily  Eplerenonoe 50mg bid  Lasix 80mg bid  Mag 400mg bid  Vitamin D-3 50mcg daily  Eliquis 5mg bid     Interval Events:  -Transferred from OSH for management of decompensated liver disease  -Hyponatremia placed on fluid restriction 1L, urea 15g bid, albumin 25% Q6  -Eliquis changed to lovenox full dose     Potential Discharge date: pending clinical improvement  Education:  Medications  Plan of care:  See Below       MEDICATIONS  (STANDING):  albumin human 25% IVPB 100 milliLiter(s) IV Intermittent every 6 hours  enoxaparin Injectable 100 milliGRAM(s) SubCutaneous <User Schedule>  piperacillin/tazobactam IVPB.. 3.375 Gram(s) IV Intermittent every 8 hours  urea Oral Powder 15 Gram(s) Oral two times a day    MEDICATIONS  (PRN):      PAST MEDICAL & SURGICAL HISTORY:  ETOH abuse      Alcoholic fatty liver      Thrombocytopenia concurrent with and due to alcoholism      Cirrhosis of liver with ascites      S/P abdominal paracentesis      History of tonsillectomy and adenoidectomy  as child          Vital Signs Last 24 Hrs  T(C): 36.8 (30 May 2022 05:00), Max: 37.2 (30 May 2022 01:05)  T(F): 98.3 (30 May 2022 05:00), Max: 98.9 (30 May 2022 01:05)  HR: 78 (30 May 2022 05:00) (71 - 94)  BP: 102/59 (30 May 2022 05:00) (102/59 - 116/55)  BP(mean): 74 (30 May 2022 05:00) (74 - 79)  RR: 18 (30 May 2022 05:00) (18 - 18)  SpO2: 95% (30 May 2022 05:00) (93% - 97%)    I&O's Summary    29 May 2022 07:01  -  30 May 2022 07:00  --------------------------------------------------------  IN: 1120 mL / OUT: 3225 mL / NET: -2105 mL    30 May 2022 07:01  -  30 May 2022 10:45  --------------------------------------------------------  IN: 340 mL / OUT: 450 mL / NET: -110 mL                              9.9    3.20  )-----------( 58       ( 30 May 2022 06:48 )             27.5     05-30    130<L>  |  89<L>  |  28<H>  ----------------------------<  98  3.3<L>   |  31  |  0.88    Ca    8.0<L>      30 May 2022 06:47  Phos  3.4     05-  Mg     2.2         TPro  5.3<L>  /  Alb  3.4  /  TBili  3.0<H>  /  DBili  x   /  AST  41<H>  /  ALT  20  /  AlkPhos  78  05-30          Culture - Body Fluid with Gram Stain (collected 22 @ 08:40)  Source: .Body Fluid Peritoneal Fluid  Gram Stain (22 @ 20:03):    polymorphonuclear leukocytes seen    No organisms seen per oil power field    by cytocentrifuge  Preliminary Report (22 @ 11:06):    No growth    Culture - Blood (collected 22 @ 20:18)  Source: .Blood None  Preliminary Report (22 @ 01:02):    No growth to date.    Culture - Blood (collected 22 @ 20:18)  Source: .Blood None  Preliminary Report (22 @ 01:02):    No growth to date.                REVIEW OF SYSTEMS  --------------------------------------------------------------------------------  Gen: No weight changes, fatigue, fevers/chills, weakness  Skin: No rashes  Head/Eyes/Ears/Mouth: No headache; Normal hearing; Normal vision w/o blurriness; No sinus pain/discomfort, sore throat  Respiratory: No dyspnea, cough, wheezing, hemoptysis  CV: No chest pain, PND, orthopnea  GI: No abdominal pain, diarrhea, constipation, nausea, vomiting, melena, hematochezia  : No increased frequency, dysuria, hematuria, nocturia  MSK: No joint pain/swelling; no back pain; no edema  Neuro: No dizziness/lightheadedness, weakness, seizures, numbness, tingling  Heme: No easy bruising or bleeding  Endo: No heat/cold intolerance  Psych: No significant nervousness, anxiety, stress, depression  All other systems were reviewed and are negative, except as noted.      PHYSICAL EXAM:  Constitutional: Well developed / well nourished  Eyes: icteric, PERRLA  ENMT: nc/at  Neck: Supple  Respiratory: CTA B/L  Cardiovascular: RRR  Gastrointestinal: Soft abdomen, NT, Distended  Genitourinary:  Voiding spontaneously/   Extremities: SCD's in place and working bilaterally  Vascular: Palpable dp pulses bilaterally  Neurological: A&O x3  Skin: jaundiced   Musculoskeletal: Moving all extremities  Psychiatric: Responsive

## 2022-05-31 ENCOUNTER — TRANSCRIPTION ENCOUNTER (OUTPATIENT)
Age: 57
End: 2022-05-31

## 2022-05-31 LAB
ALBUMIN SERPL ELPH-MCNC: 3.5 G/DL — SIGNIFICANT CHANGE UP (ref 3.3–5)
ALP SERPL-CCNC: 70 U/L — SIGNIFICANT CHANGE UP (ref 40–120)
ALT FLD-CCNC: 17 U/L — SIGNIFICANT CHANGE UP (ref 10–45)
ANION GAP SERPL CALC-SCNC: 9 MMOL/L — SIGNIFICANT CHANGE UP (ref 5–17)
ANISOCYTOSIS BLD QL: SIGNIFICANT CHANGE UP
AST SERPL-CCNC: 43 U/L — HIGH (ref 10–40)
BASOPHILS # BLD AUTO: 0.03 K/UL — SIGNIFICANT CHANGE UP (ref 0–0.2)
BASOPHILS NFR BLD AUTO: 0.9 % — SIGNIFICANT CHANGE UP (ref 0–2)
BILIRUB SERPL-MCNC: 3.1 MG/DL — HIGH (ref 0.2–1.2)
BUN SERPL-MCNC: 26 MG/DL — HIGH (ref 7–23)
CALCIUM SERPL-MCNC: 8.4 MG/DL — SIGNIFICANT CHANGE UP (ref 8.4–10.5)
CHLORIDE SERPL-SCNC: 90 MMOL/L — LOW (ref 96–108)
CO2 SERPL-SCNC: 31 MMOL/L — SIGNIFICANT CHANGE UP (ref 22–31)
CREAT SERPL-MCNC: 0.86 MG/DL — SIGNIFICANT CHANGE UP (ref 0.5–1.3)
EGFR: 100 ML/MIN/1.73M2 — SIGNIFICANT CHANGE UP
ELLIPTOCYTES BLD QL SMEAR: SLIGHT — SIGNIFICANT CHANGE UP
EOSINOPHIL # BLD AUTO: 0.32 K/UL — SIGNIFICANT CHANGE UP (ref 0–0.5)
EOSINOPHIL NFR BLD AUTO: 9.5 % — HIGH (ref 0–6)
GLUCOSE SERPL-MCNC: 95 MG/DL — SIGNIFICANT CHANGE UP (ref 70–99)
HCT VFR BLD CALC: 27.1 % — LOW (ref 39–50)
HGB BLD-MCNC: 9.5 G/DL — LOW (ref 13–17)
HYPOCHROMIA BLD QL: SIGNIFICANT CHANGE UP
LYMPHOCYTES # BLD AUTO: 0.38 K/UL — LOW (ref 1–3.3)
LYMPHOCYTES # BLD AUTO: 11.2 % — LOW (ref 13–44)
MACROCYTES BLD QL: SIGNIFICANT CHANGE UP
MAGNESIUM SERPL-MCNC: 2.2 MG/DL — SIGNIFICANT CHANGE UP (ref 1.6–2.6)
MANUAL SMEAR VERIFICATION: SIGNIFICANT CHANGE UP
MCHC RBC-ENTMCNC: 33.3 PG — SIGNIFICANT CHANGE UP (ref 27–34)
MCHC RBC-ENTMCNC: 35.1 GM/DL — SIGNIFICANT CHANGE UP (ref 32–36)
MCV RBC AUTO: 95.1 FL — SIGNIFICANT CHANGE UP (ref 80–100)
MONOCYTES # BLD AUTO: 0.77 K/UL — SIGNIFICANT CHANGE UP (ref 0–0.9)
MONOCYTES NFR BLD AUTO: 22.4 % — HIGH (ref 2–14)
NEUTROPHILS # BLD AUTO: 1.92 K/UL — SIGNIFICANT CHANGE UP (ref 1.8–7.4)
NEUTROPHILS NFR BLD AUTO: 56 % — SIGNIFICANT CHANGE UP (ref 43–77)
PHOSPHATE SERPL-MCNC: 3.2 MG/DL — SIGNIFICANT CHANGE UP (ref 2.5–4.5)
PLAT MORPH BLD: NORMAL — SIGNIFICANT CHANGE UP
PLATELET # BLD AUTO: 58 K/UL — LOW (ref 150–400)
POIKILOCYTOSIS BLD QL AUTO: SLIGHT — SIGNIFICANT CHANGE UP
POLYCHROMASIA BLD QL SMEAR: SLIGHT — SIGNIFICANT CHANGE UP
POTASSIUM SERPL-MCNC: 3.2 MMOL/L — LOW (ref 3.5–5.3)
POTASSIUM SERPL-SCNC: 3.2 MMOL/L — LOW (ref 3.5–5.3)
PROT SERPL-MCNC: 5.2 G/DL — LOW (ref 6–8.3)
RBC # BLD: 2.85 M/UL — LOW (ref 4.2–5.8)
RBC # FLD: 13.1 % — SIGNIFICANT CHANGE UP (ref 10.3–14.5)
RBC BLD AUTO: ABNORMAL
SODIUM SERPL-SCNC: 130 MMOL/L — LOW (ref 135–145)
TARGETS BLD QL SMEAR: SLIGHT — SIGNIFICANT CHANGE UP
WBC # BLD: 3.42 K/UL — LOW (ref 3.8–10.5)
WBC # FLD AUTO: 3.42 K/UL — LOW (ref 3.8–10.5)

## 2022-05-31 PROCEDURE — 99232 SBSQ HOSP IP/OBS MODERATE 35: CPT | Mod: GC

## 2022-05-31 RX ORDER — POTASSIUM CHLORIDE 20 MEQ
40 PACKET (EA) ORAL ONCE
Refills: 0 | Status: COMPLETED | OUTPATIENT
Start: 2022-05-31 | End: 2022-05-31

## 2022-05-31 RX ADMIN — Medication 15 GRAM(S): at 17:08

## 2022-05-31 RX ADMIN — Medication 50 MILLILITER(S): at 16:08

## 2022-05-31 RX ADMIN — PIPERACILLIN AND TAZOBACTAM 25 GRAM(S): 4; .5 INJECTION, POWDER, LYOPHILIZED, FOR SOLUTION INTRAVENOUS at 11:21

## 2022-05-31 RX ADMIN — PIPERACILLIN AND TAZOBACTAM 25 GRAM(S): 4; .5 INJECTION, POWDER, LYOPHILIZED, FOR SOLUTION INTRAVENOUS at 20:13

## 2022-05-31 RX ADMIN — ENOXAPARIN SODIUM 100 MILLIGRAM(S): 100 INJECTION SUBCUTANEOUS at 20:13

## 2022-05-31 RX ADMIN — Medication 50 MILLILITER(S): at 07:39

## 2022-05-31 RX ADMIN — Medication 40 MILLIEQUIVALENT(S): at 07:39

## 2022-05-31 RX ADMIN — Medication 15 GRAM(S): at 06:26

## 2022-05-31 RX ADMIN — Medication 50 MILLILITER(S): at 21:38

## 2022-05-31 RX ADMIN — Medication 50 MILLILITER(S): at 03:29

## 2022-05-31 RX ADMIN — PIPERACILLIN AND TAZOBACTAM 25 GRAM(S): 4; .5 INJECTION, POWDER, LYOPHILIZED, FOR SOLUTION INTRAVENOUS at 03:30

## 2022-05-31 RX ADMIN — ENOXAPARIN SODIUM 100 MILLIGRAM(S): 100 INJECTION SUBCUTANEOUS at 07:39

## 2022-05-31 NOTE — PROGRESS NOTE ADULT - ASSESSMENT
58 y/o M PMHx of decompensated ETOH cirrhosis (EV, L pleural effusion, PVT)  admitted 5/26 at [place] with worsening back pain, abdominal pain, and distention associated with increasing ascites despite escalation of diuretics recently. Transferred to [facility] for further management of decompensated liver disease. +SBP with culture-negative neutrocytic ascites.     #Decompensated ETOH Cirrhosis,   MELD 28  - last drink 10/31/2021.   - Drank beer (5 cans 12oz) daily x 30 years   - Currently enrolled in alcohol rehab program (about 2 months)  - Ascites: yes, not on diuretics due to degree of low sodium  - EV: 12/13/2021 + Grade 1 EV. Not on BB due to SBP  - HE: none  - L pleural effusion  - PVT (dx 11/24/2021), repeat CT pending    #Left hydrothorax  #Acute hyponatremia:   #Peritonitis, SBP.  Dx para from may 27 with TNC 29445, 90 neutrophils. On zosyn from 5/29-  CT abd/pelvis w/ IV contrast with no intraabdominal process causing peritonitis    Recommendations:  -c/w Albumin 25%100 cc q6h  -c/w urea 15 g po bid  -BMP q12H  -daily CMP and INR  -Plan for repeat paracentesis on 5/31 to assess response to abx  -Continue AC with lovenox  -low salt diet  -Accepted fro transfer to [facility]    Recommendations preliminary until signed by attending.       Thank you for involving us in the care of this patient, please reach out if any further questions.     Rakan Chaudhari MD  Gastroenterology/Hepatology Fellow, PGY5    Available on Microsoft Teams  [phone] ([facility])  [phone] ([facility])  Please contact on call fellow weekdays after 5pm-7am and weekends: [phone]       58 y/o M PMHx of decompensated ETOH cirrhosis (EV, L pleural effusion, PVT)  admitted 5/26 at [place] with worsening back pain, abdominal pain, and distention associated with increasing ascites despite escalation of diuretics recently. Transferred to [facility] for further management of decompensated liver disease. +SBP with culture-negative neutrocytic ascites.     #Decompensated ETOH Cirrhosis,   MELD 28  - last drink 10/31/2021.   - Drank beer (5 cans 12oz) daily x 30 years   - Currently enrolled in alcohol rehab program (about 2 months)  - Ascites: yes, not on diuretics due to degree of low sodium  - EV: 12/13/2021 + Grade 1 EV. Not on BB due to SBP  - HE: none  - L pleural effusion  - PVT (dx 11/24/2021), repeat CT pending    #Left hydrothorax  #Acute hyponatremia:   #Peritonitis, SBP.  Dx para from may 27 with TNC 53970, 90 neutrophils. On zosyn from 5/29-  CT abd/pelvis w/ IV contrast with no intraabdominal process causing peritonitis    Recommendations:  -c/w Albumin 25%100 cc q6h  -c/w urea 15 g po bid  -BMP q12H  -daily CMP and INR  -Plan for repeat paracentesis on 5/31 to assess response to abx  -Continue AC with lovenox  -low salt diet  -Accepted for transfer to [facility]    Recommendations preliminary until signed by attending.       Thank you for involving us in the care of this patient, please reach out if any further questions.     Rakan Chaudhari MD  Gastroenterology/Hepatology Fellow, PGY5    Available on [employer] Teams  [phone] ([facility])  [phone] ([facility])  Please contact on call fellow weekdays after 5pm-7am and weekends: [phone]

## 2022-05-31 NOTE — DISCHARGE NOTE PROVIDER - NSDCMRMEDTOKEN_GEN_ALL_CORE_FT
Eliquis 5 mg oral tablet: 1 tab(s) orally 2 times a day  ***pt states med is on hold as of today as per provider***  magnesium aspartate: 400 milligram(s) orally 2 times a day  melatonin 3 mg oral tablet: 1 tab(s) orally once a day (at bedtime), As needed, Insomnia   ciprofloxacin 500 mg oral tablet: 1 tab(s) orally once a day  Eliquis 5 mg oral tablet: 1 tab(s) orally 2 times a day    furosemide 40 mg oral tablet: 1 tab(s) orally once a day  spironolactone 50 mg oral tablet: 3 tab(s) orally once a day

## 2022-05-31 NOTE — PROGRESS NOTE ADULT - ASSESSMENT
56 y/o M PMHx of decompensated ETOH cirrhosis (EV, L pleural effusin, PVT? with multiple readmission in the past. Transferred from [facility] for management of decompensated liver disease and liver transplant evaluation.     Decompensated ETOH Cirrhosis:  - Hyponatremia: Regular diet; 1.2L fluid restriction  - Albumin 25% q 6hrs, urea 15g bid  - Ascites: scheduled for LVP with IR on 6/1  - PVT: continue full AC with lovenox 100mg BID  - Strict I&O  - SCDs/OOB  - Continue Zosyn (pending repeat LVP results)  - Pt accepted at [facility] for transplant eval/listing pending bed availability    58 y/o M PMHx of decompensated ETOH cirrhosis (EV, L pleural effusin, PVT? with multiple readmission in the past. Transferred from [facility] for management of decompensated liver disease and liver transplant evaluation.     Decompensated ETOH Cirrhosis:  - Hyponatremia: Regular diet; 1.2L fluid restriction  - Albumin 25% q 6hrs, urea 15g bid  - Ascites: scheduled for LVP with IR on 6/1  - On Zosyn (elevated neutrophil count on last LVP, CT A/P neg for peritonitis)   - PVT: continue full AC with lovenox 100mg BID  - Strict I&O  - SCDs/OOB  - Continue Zosyn (pending repeat LVP results)  - Pt accepted at [facility] for transplant eval/listing pending bed availability

## 2022-05-31 NOTE — DISCHARGE NOTE PROVIDER - NSDCCPCAREPLAN_GEN_ALL_CORE_FT
PRINCIPAL DISCHARGE DIAGNOSIS  Diagnosis: Alcoholic cirrhosis  Assessment and Plan of Treatment:

## 2022-05-31 NOTE — DISCHARGE NOTE PROVIDER - HOSPITAL COURSE
57M with h/o decompensated ETOH Cirrhosis c/b multiple hospitalizations:    11/23/21 - 11/25/21: (First )  -ascites s/p LVP 7.3L  -SOB/anasarca  -partial PVT (Eliquis)    12/10/21 - 12/14/21:   -ascites  -bacteremia (Streptococcus Salivarus/Vestibularis group, no vegetations on TTE)    5/7/22 - 12/22/22:   -SBP (7.7L), s/p CTX    Transferred from [place] on 5/28 with:  -ascites:  s/p 6L LVP on 6/27, negative for SBP. completed Empiric Zosyn-->Cipro PPx  -Hyponatremia:  diuretics held, 1L fluid restriction with improvement   -known PVT:  kept on Lovenox while in-house    In regards to his liver disease:  -ABO A, MELD on 5/30:  23  -last drink 10/31/2021 (5 cans 12oz beer daily x 30 years)  -enrolled in rehab program (~3/2022)  -EV: 12/13/2021 +Grade 1 EV  -HE: none  -L pleural effusion  -PVT (11/24/2021 on Eliquis 5mg BID)    Transferred to [facility] for liver transplant evaluation 2/2 insurance incompatibility at [facility]   57M with h/o decompensated ETOH Cirrhosis c/b multiple hospitalizations in the past"    11/23/21 - 11/25/21: (First )  -ascites s/p LVP 7.3L  -SOB/anasarca  -partial PVT (Eliquis)    12/10/21 - 12/14/21:   -ascites  -bacteremia (Streptococcus Salivarus/Vestibularis group, no vegetations on TTE)    5/7/22 - 12/22/22:   -SBP (7.7L), s/p CTX    Transferred from [place] on 5/28 with:  -ascites:  s/p 6L LVP on 6/27, negative for SBP. completed Empiric Zosyn-->Cipro PPx  -Hyponatremia:  diuretics held, 1L fluid restriction with improvement   -known PVT:  kept on Lovenox while in-house    In regards to his liver disease:  -ABO A, MELD on 5/30:  23  -last drink 10/31/2021 (5 cans 12oz beer daily x 30 years)  -enrolled in rehab program (~3/2022)  -EV: 12/13/2021 +Grade 1 EV  -HE: none  -L pleural effusion  -PVT (11/24/2021 on Eliquis 5mg BID)    Patient instructed to follow up with [facility] for liver transplant evaluation 2/2 insurance incompatibility at [facility].  Discharged home in stable condition.

## 2022-05-31 NOTE — DISCHARGE NOTE PROVIDER - DETAILS OF MALNUTRITION DIAGNOSIS/DIAGNOSES
This patient has been assessed with a concern for Malnutrition and was treated during this hospitalization for the following Nutrition diagnosis/diagnoses:     -  05/29/2022: Moderate protein-calorie malnutrition

## 2022-05-31 NOTE — PROGRESS NOTE ADULT - SUBJECTIVE AND OBJECTIVE BOX
Interval Events:   NAEON, afebrile HD stable  INR down to 2  sodium continues to improve, now 130      Hospital Medications:  albumin human 25% IVPB 100 milliLiter(s) IV Intermittent every 6 hours  enoxaparin Injectable 80 milliGRAM(s) SubCutaneous <User Schedule>  piperacillin/tazobactam IVPB.. 3.375 Gram(s) IV Intermittent every 8 hours  urea Oral Powder 15 Gram(s) Oral two times a Vital Signs Last 24 Hrs  T(C): 36.8 (31 May 2022 09:00), Max: 37.1 (30 May 2022 14:00)  T(F): 98.2 (31 May 2022 09:00), Max: 98.8 (30 May 2022 21:00)  HR: 70 (31 May 2022 09:00) (70 - 91)  BP: 114/64 (31 May 2022 09:00) (100/56 - 117/60)  BP(mean): 77 (31 May 2022 05:15) (72 - 77)  RR: 18 (31 May 2022 09:00) (18 - 18)  SpO2: 98% (31 May 2022 09:00) (93% - 98%)day      ROS: All system reviewed and negative except as mentioned above.    PHYSICAL EXAM:       GENERAL:  NAD, Appears stated age  HEENT:  NC/AT,  conjunctivae clear and pink, sclera icteric  CHEST:  CTA B/L, Normal effort  HEART:  RRR S1/S2, no JVD  ABDOMEN:  Soft, distended, non-tense, +BS, NTTP  EXTREMITIES:  No cyanosis, +1 edema  SKIN:  Warm & Dry. No rash or erythema  NEURO:  Alert, oriented, no focal deficit, no asterixis    LABS:                        9.5    3.42  )-----------( 58       ( 31 May 2022 06:08 )             27.1     05-31    130<L>  |  90<L>  |  26<H>  ----------------------------<  95  3.2<L>   |  31  |  0.86    Ca    8.4      31 May 2022 06:06  Phos  3.2     05-31  Mg     2.2     05-31    TPro  5.2<L>  /  Alb  3.5  /  TBili  3.1<H>  /  DBili  x   /  AST  43<H>  /  ALT  17  /  AlkPhos  70  05-31    LIVER FUNCTIONS - ( 31 May 2022 06:06 )  Alb: 3.5 g/dL / Pro: 5.2 g/dL / ALK PHOS: 70 U/L / ALT: 17 U/L / AST: 43 U/L / GGT: x           PT/INR - ( 30 May 2022 06:47 )   PT: 23.3 sec;   INR: 2.01 ratio                             Imaging: Images reviewed.  IMPRESSION:  Cirrhosis and portal hypertension without evidence for HCC.  Moderate to large ascites.  Short segment nonvisualization versus narrowing of the main portal vein   may be related to chronic thrombosis.

## 2022-05-31 NOTE — PROGRESS NOTE ADULT - SUBJECTIVE AND OBJECTIVE BOX
Transplant Surgery - Multidisciplinary Rounds  --------------------------------------------------------------  Present:   Patient seen and examined with multidisciplinary Transplant team including Surgeon: Dr. Freeman, Dr. Dagher, Dr. Rush, Hepatologist: Dr. Mcginnis, Nutritionist, AJCOBY Marinelli, unit RN during am rounds. Disciplines not in attendance will be notified of the plan    HPI: 58 y/o M PMHx of decompensated ETOH cirrhosis (EV, L pleural effusion, PVT). First decompensation in 11/2021.   Multiple admissions in the past with asictes/SOB, strep salivarius bacteremia, and SBP.     Transferred from [facility] with hyponatremia (), worsening ascites, and evaluation for liver transplant. He is a potential candidate however due to insurance issues being transferred to [facility].     Decompensated ETOH Cirrhosis  ABO: A+, MELD NA 29  - last drink 10/31/2021.   - Drank beer (5 cans 12oz) daily x 30 years   - Currently enrolled in alcohol rehab program (about 2 months)  - Ascites: yes, + h/o SBP. Last LVP 5/27 (6L)  - EV: 12/13/2021 + Grade 1 EV  - HE: none  - L pleural effusion  - PVT (dx 11/24/2021)     Interval Events:  - NA improving (130), remains of 1.2L fluid restriction)  - accepted at [facility], pending bed availability  - scheduled for LVP tomorrow with IR     Potential Discharge date: pending clinical improvement  Education:  Medications  Plan of care:  See Below    MEDICATIONS  (STANDING):  albumin human 25% IVPB 100 milliLiter(s) IV Intermittent every 6 hours  enoxaparin Injectable 100 milliGRAM(s) SubCutaneous <User Schedule>  piperacillin/tazobactam IVPB.. 3.375 Gram(s) IV Intermittent every 8 hours  urea Oral Powder 15 Gram(s) Oral two times a day    PAST MEDICAL & SURGICAL HISTORY:  ETOH abuse  Alcoholic fatty liver  Thrombocytopenia concurrent with and due to alcoholism  Cirrhosis of liver with ascites  S/P abdominal paracentesis  History of tonsillectomy and adenoidectomy    Vital Signs Last 24 Hrs  T(C): 36.8 (31 May 2022 09:00), Max: 37.1 (30 May 2022 14:00)  T(F): 98.2 (31 May 2022 09:00), Max: 98.8 (30 May 2022 21:00)  HR: 70 (31 May 2022 09:00) (70 - 91)  BP: 114/64 (31 May 2022 09:00) (100/56 - 117/60)  BP(mean): 77 (31 May 2022 05:15) (72 - 77)  RR: 18 (31 May 2022 09:00) (18 - 18)  SpO2: 98% (31 May 2022 09:00) (93% - 98%)    I&O's Summary    30 May 2022 07:01  -  31 May 2022 07:00  --------------------------------------------------------  IN: 1735 mL / OUT: 2625 mL / NET: -890 mL    31 May 2022 07:01  -  31 May 2022 11:39  --------------------------------------------------------  IN: 200 mL / OUT: 400 mL / NET: -200 mL                              9.5    3.42  )-----------( 58       ( 31 May 2022 06:08 )             27.1     05-31    130<L>  |  90<L>  |  26<H>  ----------------------------<  95  3.2<L>   |  31  |  0.86    Ca    8.4      31 May 2022 06:06  Phos  3.2     05-31  Mg     2.2     05-31    TPro  5.2<L>  /  Alb  3.5  /  TBili  3.1<H>  /  DBili  x   /  AST  43<H>  /  ALT  17  /  AlkPhos  70  05-31          Culture - Fungal, Body Fluid (collected 05-27-22 @ 08:40)  Source: .Body Fluid Peritoneal Fluid  Preliminary Report (05-31-22 @ 10:01):    Testing in progress    Culture - Body Fluid with Gram Stain (collected 05-27-22 @ 08:40)  Source: .Body Fluid Peritoneal Fluid  Gram Stain (05-27-22 @ 20:03):    polymorphonuclear leukocytes seen    No organisms seen per oil power field    by cytocentrifuge  Preliminary Report (05-28-22 @ 11:06):    No growth    Culture - Blood (collected 05-26-22 @ 20:18)  Source: .Blood None  Preliminary Report (05-28-22 @ 01:02):    No growth to date.    Culture - Blood (collected 05-26-22 @ 20:18)  Source: .Blood None  Preliminary Report (05-28-22 @ 01:02):    No growth to date.      REVIEW OF SYSTEMS  --------------------------------------------------------------------------------  Gen: No weight changes, fatigue, fevers/chills, weakness  Skin: No rashes  Head/Eyes/Ears/Mouth: No headache; Normal hearing; Normal vision w/o blurriness; No sinus pain/discomfort, sore throat  Respiratory: No dyspnea, cough, wheezing, hemoptysis  CV: No chest pain, PND, orthopnea  GI: No abdominal pain, diarrhea, constipation, nausea, vomiting, melena, hematochezia  : No increased frequency, dysuria, hematuria, nocturia  MSK: No joint pain/swelling; no back pain; no edema  Neuro: No dizziness/lightheadedness, weakness, seizures, numbness, tingling  Heme: No easy bruising or bleeding  Endo: No heat/cold intolerance  Psych: No significant nervousness, anxiety, stress, depression  All other systems were reviewed and are negative, except as noted.      PHYSICAL EXAM:  Constitutional: Well developed / well nourished  Eyes: icteric, PERRLA  ENMT: nc/at  Neck: Supple  Respiratory: CTA B/L  Cardiovascular: RRR  Gastrointestinal: Soft abdomen, NT, Distended  Genitourinary:  Voiding spontaneously/   Extremities: SCD's in place and working bilaterally  Vascular: Palpable dp pulses bilaterally  Neurological: A&O x3  Skin: jaundiced   Musculoskeletal: Moving all extremities  Psychiatric: Responsive

## 2022-06-01 ENCOUNTER — RESULT REVIEW (OUTPATIENT)
Age: 57
End: 2022-06-01

## 2022-06-01 LAB
ALBUMIN FLD-MCNC: 0.7 G/DL — SIGNIFICANT CHANGE UP
ALBUMIN SERPL ELPH-MCNC: 3.7 G/DL — SIGNIFICANT CHANGE UP (ref 3.3–5)
ALP SERPL-CCNC: 67 U/L — SIGNIFICANT CHANGE UP (ref 40–120)
ALT FLD-CCNC: 18 U/L — SIGNIFICANT CHANGE UP (ref 10–45)
ANION GAP SERPL CALC-SCNC: 11 MMOL/L — SIGNIFICANT CHANGE UP (ref 5–17)
ANISOCYTOSIS BLD QL: SLIGHT — SIGNIFICANT CHANGE UP
AST SERPL-CCNC: 41 U/L — HIGH (ref 10–40)
B PERT IGG+IGM PNL SER: ABNORMAL
BASOPHILS # BLD AUTO: 0 K/UL — SIGNIFICANT CHANGE UP (ref 0–0.2)
BASOPHILS NFR BLD AUTO: 0 % — SIGNIFICANT CHANGE UP (ref 0–2)
BILIRUB SERPL-MCNC: 3.5 MG/DL — HIGH (ref 0.2–1.2)
BUN SERPL-MCNC: 22 MG/DL — SIGNIFICANT CHANGE UP (ref 7–23)
CALCIUM SERPL-MCNC: 8.2 MG/DL — LOW (ref 8.4–10.5)
CHLORIDE SERPL-SCNC: 92 MMOL/L — LOW (ref 96–108)
CO2 SERPL-SCNC: 30 MMOL/L — SIGNIFICANT CHANGE UP (ref 22–31)
COLOR FLD: SIGNIFICANT CHANGE UP
CREAT SERPL-MCNC: 0.85 MG/DL — SIGNIFICANT CHANGE UP (ref 0.5–1.3)
CULTURE RESULTS: SIGNIFICANT CHANGE UP
DACRYOCYTES BLD QL SMEAR: SLIGHT — SIGNIFICANT CHANGE UP
EGFR: 100 ML/MIN/1.73M2 — SIGNIFICANT CHANGE UP
EOSINOPHIL # BLD AUTO: 0.1 K/UL — SIGNIFICANT CHANGE UP (ref 0–0.5)
EOSINOPHIL NFR BLD AUTO: 3.3 % — SIGNIFICANT CHANGE UP (ref 0–6)
FLUID INTAKE SUBSTANCE CLASS: SIGNIFICANT CHANGE UP
GLUCOSE FLD-MCNC: 101 MG/DL — SIGNIFICANT CHANGE UP
GLUCOSE SERPL-MCNC: 97 MG/DL — SIGNIFICANT CHANGE UP (ref 70–99)
GRAM STN FLD: SIGNIFICANT CHANGE UP
HCT VFR BLD CALC: 28.2 % — LOW (ref 39–50)
HGB BLD-MCNC: 9.9 G/DL — LOW (ref 13–17)
INR BLD: 1.9 RATIO — HIGH (ref 0.88–1.16)
LDH SERPL L TO P-CCNC: 64 U/L — SIGNIFICANT CHANGE UP
LYMPHOCYTES # BLD AUTO: 0.42 K/UL — LOW (ref 1–3.3)
LYMPHOCYTES # BLD AUTO: 14.1 % — SIGNIFICANT CHANGE UP (ref 13–44)
LYMPHOCYTES # FLD: 87 % — SIGNIFICANT CHANGE UP
MAGNESIUM SERPL-MCNC: 2.3 MG/DL — SIGNIFICANT CHANGE UP (ref 1.6–2.6)
MANUAL SMEAR VERIFICATION: SIGNIFICANT CHANGE UP
MCHC RBC-ENTMCNC: 33.6 PG — SIGNIFICANT CHANGE UP (ref 27–34)
MCHC RBC-ENTMCNC: 35.1 GM/DL — SIGNIFICANT CHANGE UP (ref 32–36)
MCV RBC AUTO: 95.6 FL — SIGNIFICANT CHANGE UP (ref 80–100)
MONOCYTES # BLD AUTO: 0.49 K/UL — SIGNIFICANT CHANGE UP (ref 0–0.9)
MONOCYTES NFR BLD AUTO: 16.3 % — HIGH (ref 2–14)
MONOS+MACROS # FLD: 11 % — SIGNIFICANT CHANGE UP
NEUTROPHILS # BLD AUTO: 1.96 K/UL — SIGNIFICANT CHANGE UP (ref 1.8–7.4)
NEUTROPHILS NFR BLD AUTO: 65.2 % — SIGNIFICANT CHANGE UP (ref 43–77)
NEUTROPHILS-BODY FLUID: 2 % — SIGNIFICANT CHANGE UP
OVALOCYTES BLD QL SMEAR: SLIGHT — SIGNIFICANT CHANGE UP
PHOSPHATE SERPL-MCNC: 3.3 MG/DL — SIGNIFICANT CHANGE UP (ref 2.5–4.5)
PLAT MORPH BLD: NORMAL — SIGNIFICANT CHANGE UP
PLATELET # BLD AUTO: 55 K/UL — LOW (ref 150–400)
POIKILOCYTOSIS BLD QL AUTO: SLIGHT — SIGNIFICANT CHANGE UP
POTASSIUM SERPL-MCNC: 3.2 MMOL/L — LOW (ref 3.5–5.3)
POTASSIUM SERPL-SCNC: 3.2 MMOL/L — LOW (ref 3.5–5.3)
PROT FLD-MCNC: 1.3 G/DL — SIGNIFICANT CHANGE UP
PROT SERPL-MCNC: 5.7 G/DL — LOW (ref 6–8.3)
PROTHROM AB SERPL-ACNC: 22.2 SEC — HIGH (ref 10.5–13.4)
RBC # BLD: 2.95 M/UL — LOW (ref 4.2–5.8)
RBC # FLD: 13.6 % — SIGNIFICANT CHANGE UP (ref 10.3–14.5)
RBC BLD AUTO: ABNORMAL
RCV VOL RI: HIGH /UL (ref 0–0)
SODIUM SERPL-SCNC: 133 MMOL/L — LOW (ref 135–145)
SPECIMEN SOURCE: SIGNIFICANT CHANGE UP
TARGETS BLD QL SMEAR: SLIGHT — SIGNIFICANT CHANGE UP
TOTAL NUCLEATED CELL COUNT, BODY FLUID: 399 /UL — SIGNIFICANT CHANGE UP
TUBE TYPE: SIGNIFICANT CHANGE UP
VARIANT LYMPHS # BLD: 1.1 % — SIGNIFICANT CHANGE UP (ref 0–6)
WBC # BLD: 3.01 K/UL — LOW (ref 3.8–10.5)
WBC # FLD AUTO: 3.01 K/UL — LOW (ref 3.8–10.5)

## 2022-06-01 PROCEDURE — 88305 TISSUE EXAM BY PATHOLOGIST: CPT | Mod: 26

## 2022-06-01 PROCEDURE — 49083 ABD PARACENTESIS W/IMAGING: CPT

## 2022-06-01 PROCEDURE — 99232 SBSQ HOSP IP/OBS MODERATE 35: CPT | Mod: GC

## 2022-06-01 PROCEDURE — 88112 CYTOPATH CELL ENHANCE TECH: CPT | Mod: 26

## 2022-06-01 RX ORDER — POTASSIUM CHLORIDE 20 MEQ
20 PACKET (EA) ORAL
Refills: 0 | Status: COMPLETED | OUTPATIENT
Start: 2022-06-01 | End: 2022-06-01

## 2022-06-01 RX ORDER — ALBUMIN HUMAN 25 %
50 VIAL (ML) INTRAVENOUS ONCE
Refills: 0 | Status: COMPLETED | OUTPATIENT
Start: 2022-06-01 | End: 2022-06-01

## 2022-06-01 RX ORDER — SPIRONOLACTONE 25 MG/1
100 TABLET, FILM COATED ORAL DAILY
Refills: 0 | Status: DISCONTINUED | OUTPATIENT
Start: 2022-06-01 | End: 2022-06-03

## 2022-06-01 RX ORDER — FUROSEMIDE 40 MG
20 TABLET ORAL DAILY
Refills: 0 | Status: DISCONTINUED | OUTPATIENT
Start: 2022-06-01 | End: 2022-06-03

## 2022-06-01 RX ADMIN — Medication 15 GRAM(S): at 05:51

## 2022-06-01 RX ADMIN — Medication 15 GRAM(S): at 17:00

## 2022-06-01 RX ADMIN — Medication 20 MILLIGRAM(S): at 11:59

## 2022-06-01 RX ADMIN — PIPERACILLIN AND TAZOBACTAM 25 GRAM(S): 4; .5 INJECTION, POWDER, LYOPHILIZED, FOR SOLUTION INTRAVENOUS at 03:19

## 2022-06-01 RX ADMIN — ENOXAPARIN SODIUM 100 MILLIGRAM(S): 100 INJECTION SUBCUTANEOUS at 20:59

## 2022-06-01 RX ADMIN — Medication 50 MILLILITER(S): at 21:01

## 2022-06-01 RX ADMIN — Medication 300 MILLILITER(S): at 15:22

## 2022-06-01 RX ADMIN — Medication 50 MILLILITER(S): at 03:19

## 2022-06-01 RX ADMIN — Medication 50 MILLILITER(S): at 16:12

## 2022-06-01 RX ADMIN — PIPERACILLIN AND TAZOBACTAM 25 GRAM(S): 4; .5 INJECTION, POWDER, LYOPHILIZED, FOR SOLUTION INTRAVENOUS at 20:51

## 2022-06-01 RX ADMIN — Medication 20 MILLIEQUIVALENT(S): at 13:00

## 2022-06-01 RX ADMIN — SPIRONOLACTONE 100 MILLIGRAM(S): 25 TABLET, FILM COATED ORAL at 13:00

## 2022-06-01 RX ADMIN — Medication 200 MILLILITER(S): at 14:25

## 2022-06-01 RX ADMIN — Medication 20 MILLIEQUIVALENT(S): at 11:59

## 2022-06-01 NOTE — PROGRESS NOTE ADULT - ASSESSMENT
56 y/o M PMHx of decompensated ETOH cirrhosis (EV, L pleural effusion, PVT)  admitted 5/26 at [place] with worsening back pain, abdominal pain, and distention associated with increasing ascites despite escalation of diuretics recently. Transferred to [facility] for further management of decompensated liver disease. +SBP with culture-negative neutrocytic ascites.     #Decompensated ETOH Cirrhosis,   MELD 28  - last drink 10/31/2021.   - Drank beer (5 cans 12oz) daily x 30 years   - Currently enrolled in alcohol rehab program (about 2 months)  - Ascites: yes, not on diuretics due to degree of low sodium  - EV: 12/13/2021 + Grade 1 EV. Not on BB due to SBP  - HE: none  - L pleural effusion  - PVT (dx 11/24/2021), repeat CT pending    #Left hydrothorax  #Acute hyponatremia:   #Peritonitis, SBP.  Dx para from may 27 with TNC 60513, 90 neutrophils. On zosyn from 5/29-  CT abd/pelvis w/ IV contrast with no intraabdominal process causing peritonitis    Recommendations:  -c/w Albumin 25%100 cc q6h  -c/w urea 15 g po bid  -BMP q12H  -daily CMP and INR  -Plan for repeat paracentesis on 6/1 to assess response to abx  -Continue AC with lovenox  -low salt diet  -Accepted for transfer to [facility]    Recommendations preliminary until signed by attending.       Thank you for involving us in the care of this patient, please reach out if any further questions.     Rakan Chaudhari MD  Gastroenterology/Hepatology Fellow, PGY5    Available on [employer] Teams  [phone] ([facility])  [phone] ([facility])  Please contact on call fellow weekdays after 5pm-7am and weekends: [phone]

## 2022-06-01 NOTE — PROGRESS NOTE ADULT - SUBJECTIVE AND OBJECTIVE BOX
Transplant Surgery - Multidisciplinary Rounds  --------------------------------------------------------------  Present:   Patient seen and examined with multidisciplinary Transplant team including Surgeon: Dr. Freeman, Dr. Dagher, Dr. Rush, Hepatologist: Dr. Mcginnis, Nutritionist, JACOBY Marinelli, unit RN during am rounds. Disciplines not in attendance will be notified of the plan    HPI: 58 y/o M PMHx of decompensated ETOH cirrhosis (EV, L pleural effusion, PVT). First decompensation in 11/2021.   Multiple admissions in the past with asictes/SOB, strep salivarius bacteremia, and SBP.     Transferred from [facility] with hyponatremia (), worsening ascites, and evaluation for liver transplant. He is a potential candidate however due to insurance issues being transferred to [facility].     Decompensated ETOH Cirrhosis  ABO: A+, MELD NA 29  - last drink 10/31/2021.   - Drank beer (5 cans 12oz) daily x 30 years   - Currently enrolled in alcohol rehab program (about 2 months)  - Ascites: yes, + h/o SBP. Last LVP 5/27 (6L)  - EV: 12/13/2021 + Grade 1 EV  - HE: none  - L pleural effusion  - PVT (dx 11/24/2021)       Interval Events:  - Remains afebrile, hemodynamically stable   - NA improving (133), remains of 1.2L fluid restriction)  - Pending transfer to [place], pending bed availability  - lovenox inc to 100 BID   - scheduled for LVP today with IR       Potential Discharge date: pending clinical improvement  Education:  Medications  Plan of care:  See Below        MEDICATIONS  (STANDING):  albumin human 25% IVPB 100 milliLiter(s) IV Intermittent every 6 hours  enoxaparin Injectable 100 milliGRAM(s) SubCutaneous <User Schedule>  piperacillin/tazobactam IVPB.. 3.375 Gram(s) IV Intermittent every 8 hours  potassium chloride    Tablet ER 20 milliEquivalent(s) Oral every 2 hours  urea Oral Powder 15 Gram(s) Oral two times a day    MEDICATIONS  (PRN):      PAST MEDICAL & SURGICAL HISTORY:  ETOH abuse      Alcoholic fatty liver      Thrombocytopenia concurrent with and due to alcoholism      Cirrhosis of liver with ascites      S/P abdominal paracentesis      History of tonsillectomy and adenoidectomy  as child          Vital Signs Last 24 Hrs  T(C): 37.4 (01 Jun 2022 08:24), Max: 37.4 (01 Jun 2022 08:24)  T(F): 99.4 (01 Jun 2022 08:24), Max: 99.4 (01 Jun 2022 08:24)  HR: 68 (01 Jun 2022 08:24) (68 - 79)  BP: 101/61 (01 Jun 2022 08:24) (101/59 - 122/63)  BP(mean): --  RR: 18 (01 Jun 2022 08:24) (18 - 18)  SpO2: 94% (01 Jun 2022 08:24) (93% - 97%)    I&O's Summary    31 May 2022 07:01  -  01 Jun 2022 07:00  --------------------------------------------------------  IN: 1760 mL / OUT: 2750 mL / NET: -990 mL    01 Jun 2022 07:01  -  01 Jun 2022 10:56  --------------------------------------------------------  IN: 0 mL / OUT: 0 mL / NET: 0 mL          LABS:                        9.9    3.01  )-----------( 55       ( 01 Jun 2022 07:12 )             28.2     06-01    133<L>  |  92<L>  |  22  ----------------------------<  97  3.2<L>   |  30  |  0.85    Ca    8.2<L>      01 Jun 2022 07:12  Phos  3.3     06-01  Mg     2.3     06-01    TPro  5.7<L>  /  Alb  3.7  /  TBili  3.5<H>  /  DBili  x   /  AST  41<H>  /  ALT  18  /  AlkPhos  67  06-01    PT/INR - ( 01 Jun 2022 07:13 )   PT: 22.2 sec;   INR: 1.90 ratio             CAPILLARY BLOOD GLUCOSE        LIVER FUNCTIONS - ( 01 Jun 2022 07:12 )  Alb: 3.7 g/dL / Pro: 5.7 g/dL / ALK PHOS: 67 U/L / ALT: 18 U/L / AST: 41 U/L / GGT: x             Culture - Fungal, Body Fluid (collected 05-27-22 @ 08:40)  Source: .Body Fluid Peritoneal Fluid  Preliminary Report (05-31-22 @ 10:01):    Testing in progress    Culture - Body Fluid with Gram Stain (collected 05-27-22 @ 08:40)  Source: .Body Fluid Peritoneal Fluid  Gram Stain (05-27-22 @ 20:03):    polymorphonuclear leukocytes seen    No organisms seen per oil power field    by cytocentrifuge  Final Report (06-01-22 @ 09:54):    No growth at 5 days    Culture - Blood (collected 05-26-22 @ 20:18)  Source: .Blood None  Final Report (06-01-22 @ 01:01):    No Growth Final    Culture - Blood (collected 05-26-22 @ 20:18)  Source: .Blood None  Final Report (06-01-22 @ 01:01):    No Growth Final          Cultures:    Culture - Fungal, Body Fluid (collected 05-27-22 @ 08:40)  Source: .Body Fluid Peritoneal Fluid  Preliminary Report (05-31-22 @ 10:01):    Testing in progress    Culture - Body Fluid with Gram Stain (collected 05-27-22 @ 08:40)  Source: .Body Fluid Peritoneal Fluid  Gram Stain (05-27-22 @ 20:03):    polymorphonuclear leukocytes seen    No organisms seen per oil power field    by cytocentrifuge  Final Report (06-01-22 @ 09:54):    No growth at 5 days    Culture - Blood (collected 05-26-22 @ 20:18)  Source: .Blood None  Final Report (06-01-22 @ 01:01):    No Growth Final    Culture - Blood (collected 05-26-22 @ 20:18)  Source: .Blood None  Final Report (06-01-22 @ 01:01):    No Growth Final          REVIEW OF SYSTEMS  --------------------------------------------------------------------------------  Gen: No weight changes, fatigue, fevers/chills, weakness  Skin: No rashes  Head/Eyes/Ears/Mouth: No headache; Normal hearing; Normal vision w/o blurriness; No sinus pain/discomfort, sore throat  Respiratory: No dyspnea, cough, wheezing, hemoptysis  CV: No chest pain, PND, orthopnea  GI: No abdominal pain, diarrhea, constipation, nausea, vomiting, melena, hematochezia  : No increased frequency, dysuria, hematuria, nocturia  MSK: No joint pain/swelling; no back pain; no edema  Neuro: No dizziness/lightheadedness, weakness, seizures, numbness, tingling  Heme: No easy bruising or bleeding  Endo: No heat/cold intolerance  Psych: No significant nervousness, anxiety, stress, depression  All other systems were reviewed and are negative, except as noted.      PHYSICAL EXAM:  Constitutional: Well developed / well nourished  Eyes: icteric, PERRLA  ENMT: nc/at  Neck: Supple  Respiratory: CTA B/L  Cardiovascular: RRR  Gastrointestinal: Soft abdomen, NT, Distended  Genitourinary:  Voiding spontaneously/   Extremities: SCD's in place and working bilaterally  Vascular: Palpable dp pulses bilaterally  Neurological: A&O x3  Skin: jaundiced   Musculoskeletal: Moving all extremities  Psychiatric: Responsive Transplant Surgery - Multidisciplinary Rounds  --------------------------------------------------------------  Present:   Patient seen and examined with multidisciplinary Transplant team including Surgeon: Dr. Freeman, Hepatologist: Dr. Mcginnis, NP Abilio, unit RN during am rounds. Disciplines not in attendance will be notified of the plan    HPI: 58 y/o M PMHx of decompensated ETOH cirrhosis (EV, L pleural effusion, PVT). First decompensation in 11/2021.   Multiple admissions in the past with asictes/SOB, strep salivarius bacteremia, and SBP.     Transferred from [facility] with hyponatremia (), worsening ascites, and evaluation for liver transplant. He is a potential candidate however due to insurance issues being transferred to [facility].     Decompensated ETOH Cirrhosis  ABO: A+, MELD NA 29  - last drink 10/31/2021.   - Drank beer (5 cans 12oz) daily x 30 years   - Currently enrolled in alcohol rehab program (about 2 months)  - Ascites: yes, + h/o SBP. Last LVP 5/27 (6L)  - EV: 12/13/2021 + Grade 1 EV  - HE: none  - L pleural effusion  - PVT (dx 11/24/2021)       Interval Events:  - Remains afebrile, hemodynamically stable   - NA improving (133), remains of 1.2L fluid restriction)  - Pending transfer to [place], pending bed availability  - lovenox inc to 100 BID   - scheduled for LVP today with IR       Potential Discharge date: pending clinical improvement  Education:  Medications  Plan of care:  See Below        MEDICATIONS  (STANDING):  albumin human 25% IVPB 100 milliLiter(s) IV Intermittent every 6 hours  enoxaparin Injectable 100 milliGRAM(s) SubCutaneous <User Schedule>  piperacillin/tazobactam IVPB.. 3.375 Gram(s) IV Intermittent every 8 hours  potassium chloride    Tablet ER 20 milliEquivalent(s) Oral every 2 hours  urea Oral Powder 15 Gram(s) Oral two times a day    MEDICATIONS  (PRN):      PAST MEDICAL & SURGICAL HISTORY:  ETOH abuse      Alcoholic fatty liver      Thrombocytopenia concurrent with and due to alcoholism      Cirrhosis of liver with ascites      S/P abdominal paracentesis      History of tonsillectomy and adenoidectomy  as child          Vital Signs Last 24 Hrs  T(C): 37.4 (01 Jun 2022 08:24), Max: 37.4 (01 Jun 2022 08:24)  T(F): 99.4 (01 Jun 2022 08:24), Max: 99.4 (01 Jun 2022 08:24)  HR: 68 (01 Jun 2022 08:24) (68 - 79)  BP: 101/61 (01 Jun 2022 08:24) (101/59 - 122/63)  BP(mean): --  RR: 18 (01 Jun 2022 08:24) (18 - 18)  SpO2: 94% (01 Jun 2022 08:24) (93% - 97%)    I&O's Summary    31 May 2022 07:01  -  01 Jun 2022 07:00  --------------------------------------------------------  IN: 1760 mL / OUT: 2750 mL / NET: -990 mL    01 Jun 2022 07:01  -  01 Jun 2022 10:56  --------------------------------------------------------  IN: 0 mL / OUT: 0 mL / NET: 0 mL          LABS:                        9.9    3.01  )-----------( 55       ( 01 Jun 2022 07:12 )             28.2     06-01    133<L>  |  92<L>  |  22  ----------------------------<  97  3.2<L>   |  30  |  0.85    Ca    8.2<L>      01 Jun 2022 07:12  Phos  3.3     06-01  Mg     2.3     06-01    TPro  5.7<L>  /  Alb  3.7  /  TBili  3.5<H>  /  DBili  x   /  AST  41<H>  /  ALT  18  /  AlkPhos  67  06-01    PT/INR - ( 01 Jun 2022 07:13 )   PT: 22.2 sec;   INR: 1.90 ratio             CAPILLARY BLOOD GLUCOSE        LIVER FUNCTIONS - ( 01 Jun 2022 07:12 )  Alb: 3.7 g/dL / Pro: 5.7 g/dL / ALK PHOS: 67 U/L / ALT: 18 U/L / AST: 41 U/L / GGT: x             Culture - Fungal, Body Fluid (collected 05-27-22 @ 08:40)  Source: .Body Fluid Peritoneal Fluid  Preliminary Report (05-31-22 @ 10:01):    Testing in progress    Culture - Body Fluid with Gram Stain (collected 05-27-22 @ 08:40)  Source: .Body Fluid Peritoneal Fluid  Gram Stain (05-27-22 @ 20:03):    polymorphonuclear leukocytes seen    No organisms seen per oil power field    by cytocentrifuge  Final Report (06-01-22 @ 09:54):    No growth at 5 days    Culture - Blood (collected 05-26-22 @ 20:18)  Source: .Blood None  Final Report (06-01-22 @ 01:01):    No Growth Final    Culture - Blood (collected 05-26-22 @ 20:18)  Source: .Blood None  Final Report (06-01-22 @ 01:01):    No Growth Final          Cultures:    Culture - Fungal, Body Fluid (collected 05-27-22 @ 08:40)  Source: .Body Fluid Peritoneal Fluid  Preliminary Report (05-31-22 @ 10:01):    Testing in progress    Culture - Body Fluid with Gram Stain (collected 05-27-22 @ 08:40)  Source: .Body Fluid Peritoneal Fluid  Gram Stain (05-27-22 @ 20:03):    polymorphonuclear leukocytes seen    No organisms seen per oil power field    by cytocentrifuge  Final Report (06-01-22 @ 09:54):    No growth at 5 days    Culture - Blood (collected 05-26-22 @ 20:18)  Source: .Blood None  Final Report (06-01-22 @ 01:01):    No Growth Final    Culture - Blood (collected 05-26-22 @ 20:18)  Source: .Blood None  Final Report (06-01-22 @ 01:01):    No Growth Final          REVIEW OF SYSTEMS  --------------------------------------------------------------------------------  Gen: No weight changes, fatigue, fevers/chills, weakness  Skin: No rashes  Head/Eyes/Ears/Mouth: No headache; Normal hearing; Normal vision w/o blurriness; No sinus pain/discomfort, sore throat  Respiratory: No dyspnea, cough, wheezing, hemoptysis  CV: No chest pain, PND, orthopnea  GI: No abdominal pain, diarrhea, constipation, nausea, vomiting, melena, hematochezia  : No increased frequency, dysuria, hematuria, nocturia  MSK: No joint pain/swelling; no back pain; no edema  Neuro: No dizziness/lightheadedness, weakness, seizures, numbness, tingling  Heme: No easy bruising or bleeding  Endo: No heat/cold intolerance  Psych: No significant nervousness, anxiety, stress, depression  All other systems were reviewed and are negative, except as noted.      PHYSICAL EXAM:  Constitutional: Well developed / well nourished  Eyes: icteric, PERRLA  ENMT: nc/at  Neck: Supple  Respiratory: CTA B/L  Cardiovascular: RRR  Gastrointestinal: Soft abdomen, NT, Distended  Genitourinary:  Voiding spontaneously/   Extremities: SCD's in place and working bilaterally  Vascular: Palpable dp pulses bilaterally  Neurological: A&O x3  Skin: jaundiced   Musculoskeletal: Moving all extremities  Psychiatric: Responsive

## 2022-06-01 NOTE — PRE PROCEDURE NOTE - PRE PROCEDURE EVALUATION
Interventional Radiology    HPI: 57y Male with decompensated ETOH cirrhosis here for management of decompensated liver disease and liver transplant evaluation. Patient with abdominal distension and ascites, IR consulted for paracentesis.    Allergies:   Medications (Abx/Cardiac/Anticoagulation/Blood Products)    enoxaparin Injectable: 100 milliGRAM(s) SubCutaneous (05-31 @ 20:13)  furosemide    Tablet: 20 milliGRAM(s) Oral (06-01 @ 11:59)  piperacillin/tazobactam IVPB..: 25 mL/Hr IV Intermittent (06-01 @ 11:59)  spironolactone: 100 milliGRAM(s) Oral (06-01 @ 13:00)  urea Oral Powder: 15 Gram(s) Oral (06-01 @ 05:51)    Data:    T(C): 36.6  HR: 65  BP: 102/64  RR: 20  SpO2: 94%    Exam  General: No acute distress  Chest: Non labored breathing  Abdomen: Non-distended  Extremities: No swelling, warm    -WBC 3.01 / HgB 9.9 / Hct 28.2 / Plt 55  -Na 133 / Cl 92 / BUN 22 / Glucose 97  -K 3.2 / CO2 30 / Cr 0.85  -ALT 18 / Alk Phos 67 / T.Bili 3.5  -INR1.90    Plan: 57y Male with decompensated ETOH cirrhosis here for management of decompensated liver disease and liver transplant evaluation. Patient with abdominal distension and ascites, IR consulted for paracentesis.  -Risks/Benefits/alternatives explained with the patient and/or healthcare proxy and witnessed informed consent obtained.

## 2022-06-01 NOTE — PROGRESS NOTE ADULT - SUBJECTIVE AND OBJECTIVE BOX
Interval Events:   NAEON, afebrile HD stable  Accepted for transfer to [facility]  Undergoing para today      Hospital Medications:  albumin human 25% IVPB 100 milliLiter(s) IV Intermittent every 6 hours  enoxaparin Injectable 80 milliGRAM(s) SubCutaneous <User Schedule>  piperacillin/tazobactam IVPB.. 3.375 Gram(s) IV Intermittent every 8 hours  urea Oral Powder 15 Gram(s) Oral two times a Vital Signs Last 24 Hrs  T(C): 36.8 (31 May 2022 09:00), Max: 37.1 (30 May 2022 14:00)  T(F): 98.2 (31 May 2022 09:00), Max: 98.8 (30 May 2022 21:00)  HR: 70 (31 May 2022 09:00) (70 - 91)  BP: 114/64 (31 May 2022 09:00) (100/56 - 117/60)  BP(mean): 77 (31 May 2022 05:15) (72 - 77)  RR: 18 (31 May 2022 09:00) (18 - 18)  SpO2: 98% (31 May 2022 09:00) (93% - 98%)day      ROS: All system reviewed and negative except as mentioned above.    PHYSICAL EXAM:   Vital Signs Last 24 Hrs  T(C): 36.9 (01 Jun 2022 13:20), Max: 37.4 (01 Jun 2022 08:24)  T(F): 98.4 (01 Jun 2022 13:20), Max: 99.4 (01 Jun 2022 08:24)  HR: 67 (01 Jun 2022 13:20) (65 - 79)  BP: 101/62 (01 Jun 2022 13:20) (101/59 - 122/63)  BP(mean): --  RR: 16 (01 Jun 2022 13:20) (16 - 20)  SpO2: 95% (01 Jun 2022 13:20) (93% - 95%)    GENERAL:  NAD, Appears stated age  HEENT:  NC/AT,  conjunctivae clear and pink, sclera icteric  CHEST:  CTA B/L, Normal effort  HEART:  RRR S1/S2, no JVD  ABDOMEN:  Soft, distended, non-tense, +BS, NTTP  EXTREMITIES:  No cyanosis, +1 edema  SKIN:  Warm & Dry. No rash or erythema  NEURO:  Alert, oriented, no focal deficit, no asterixis    LABS:                        9.9    3.01  )-----------( 55       ( 01 Jun 2022 07:12 )             28.2     06-01    133<L>  |  92<L>  |  22  ----------------------------<  97  3.2<L>   |  30  |  0.85    Ca    8.2<L>      01 Jun 2022 07:12  Phos  3.3     06-01  Mg     2.3     06-01    TPro  5.7<L>  /  Alb  3.7  /  TBili  3.5<H>  /  DBili  x   /  AST  41<H>  /  ALT  18  /  AlkPhos  67  06-01    LIVER FUNCTIONS - ( 01 Jun 2022 07:12 )  Alb: 3.7 g/dL / Pro: 5.7 g/dL / ALK PHOS: 67 U/L / ALT: 18 U/L / AST: 41 U/L / GGT: x           PT/INR - ( 01 Jun 2022 07:13 )   PT: 22.2 sec;   INR: 1.90 ratio           Imaging: Images reviewed.  IMPRESSION:  Cirrhosis and portal hypertension without evidence for HCC.  Moderate to large ascites.  Short segment nonvisualization versus narrowing of the main portal vein   may be related to chronic thrombosis.

## 2022-06-01 NOTE — PROGRESS NOTE ADULT - ASSESSMENT
56 y/o M PMHx of decompensated ETOH cirrhosis (EV, L pleural effusin, PVT? with multiple readmission in the past. Transferred from [facility] for management of decompensated liver disease and liver transplant evaluation.       Decompensated ETOH Cirrhosis:  - Hyponatremia improving: Regular diet; 1.2L fluid restriction  - Albumin 25% q 6hrs, urea 15g bid  - Ascites: scheduled for LVP with IR on 6/1  - On Zosyn (elevated neutrophil count on last LVP, CT A/P neg for peritonitis)   - PVT: continue full AC with lovenox 100mg BID  - Strict I&O  - SCDs/OOB  - Continue Zosyn (pending repeat LVP results)  - Pt accepted at [facility] for transplant eval/listing pending bed availability    56 y/o M PMHx of decompensated ETOH cirrhosis (EV, L pleural effusin, PVT? with multiple readmission in the past. Transferred from [facility] for management of decompensated liver disease and liver transplant evaluation.       Decompensated ETOH Cirrhosis:  - Hyponatremia improving: Regular diet; 1.2L fluid restriction  - Start Lasix 20mg qd and Spironolactone 100mg po QD  - Albumin 25% q 6hrs, urea 15g bid  - Ascites: LVP with IR today  - On Zosyn (elevated neutrophil count on last LVP, CT A/P neg for peritonitis)   - PVT: continue full AC with lovenox 100mg BID  - Strict I&O  - SCDs/OOB  - Continue Zosyn (pending repeat LVP results)  - Pt accepted at [facility] for transplant eval/listing pending bed availability

## 2022-06-01 NOTE — PROCEDURE NOTE - PROCEDURE FINDINGS AND DETAILS
RLQ Paracentesis performed. 6.3L zuly fluid removed with albumin 1:3 replacement. Patient tolerated procedure well without issue.

## 2022-06-02 LAB
ALBUMIN SERPL ELPH-MCNC: 4.1 G/DL — SIGNIFICANT CHANGE UP (ref 3.3–5)
ALBUMIN SERPL ELPH-MCNC: 4.8 G/DL — SIGNIFICANT CHANGE UP (ref 3.3–5)
ALP SERPL-CCNC: 61 U/L — SIGNIFICANT CHANGE UP (ref 40–120)
ALP SERPL-CCNC: 69 U/L — SIGNIFICANT CHANGE UP (ref 40–120)
ALT FLD-CCNC: 14 U/L — SIGNIFICANT CHANGE UP (ref 10–45)
ALT FLD-CCNC: 16 U/L — SIGNIFICANT CHANGE UP (ref 10–45)
ANION GAP SERPL CALC-SCNC: 11 MMOL/L — SIGNIFICANT CHANGE UP (ref 5–17)
ANION GAP SERPL CALC-SCNC: 8 MMOL/L — SIGNIFICANT CHANGE UP (ref 5–17)
ANISOCYTOSIS BLD QL: SIGNIFICANT CHANGE UP
AST SERPL-CCNC: 35 U/L — SIGNIFICANT CHANGE UP (ref 10–40)
AST SERPL-CCNC: 39 U/L — SIGNIFICANT CHANGE UP (ref 10–40)
BASOPHILS # BLD AUTO: 0.03 K/UL — SIGNIFICANT CHANGE UP (ref 0–0.2)
BASOPHILS NFR BLD AUTO: 0.9 % — SIGNIFICANT CHANGE UP (ref 0–2)
BILIRUB SERPL-MCNC: 3.2 MG/DL — HIGH (ref 0.2–1.2)
BILIRUB SERPL-MCNC: 3.4 MG/DL — HIGH (ref 0.2–1.2)
BUN SERPL-MCNC: 31 MG/DL — HIGH (ref 7–23)
BUN SERPL-MCNC: 35 MG/DL — HIGH (ref 7–23)
CALCIUM SERPL-MCNC: 8.8 MG/DL — SIGNIFICANT CHANGE UP (ref 8.4–10.5)
CALCIUM SERPL-MCNC: 9.6 MG/DL — SIGNIFICANT CHANGE UP (ref 8.4–10.5)
CHLORIDE SERPL-SCNC: 96 MMOL/L — SIGNIFICANT CHANGE UP (ref 96–108)
CHLORIDE SERPL-SCNC: 97 MMOL/L — SIGNIFICANT CHANGE UP (ref 96–108)
CO2 SERPL-SCNC: 28 MMOL/L — SIGNIFICANT CHANGE UP (ref 22–31)
CO2 SERPL-SCNC: 30 MMOL/L — SIGNIFICANT CHANGE UP (ref 22–31)
CREAT SERPL-MCNC: 0.73 MG/DL — SIGNIFICANT CHANGE UP (ref 0.5–1.3)
CREAT SERPL-MCNC: 0.82 MG/DL — SIGNIFICANT CHANGE UP (ref 0.5–1.3)
DACRYOCYTES BLD QL SMEAR: SLIGHT — SIGNIFICANT CHANGE UP
EGFR: 101 ML/MIN/1.73M2 — SIGNIFICANT CHANGE UP
EGFR: 105 ML/MIN/1.73M2 — SIGNIFICANT CHANGE UP
ELLIPTOCYTES BLD QL SMEAR: SLIGHT — SIGNIFICANT CHANGE UP
EOSINOPHIL # BLD AUTO: 0.12 K/UL — SIGNIFICANT CHANGE UP (ref 0–0.5)
EOSINOPHIL NFR BLD AUTO: 3.5 % — SIGNIFICANT CHANGE UP (ref 0–6)
GIANT PLATELETS BLD QL SMEAR: PRESENT — SIGNIFICANT CHANGE UP
GLUCOSE SERPL-MCNC: 112 MG/DL — HIGH (ref 70–99)
GLUCOSE SERPL-MCNC: 131 MG/DL — HIGH (ref 70–99)
HCT VFR BLD CALC: 30.4 % — LOW (ref 39–50)
HGB BLD-MCNC: 10.4 G/DL — LOW (ref 13–17)
INR BLD: 2.05 RATIO — HIGH (ref 0.88–1.16)
LYMPHOCYTES # BLD AUTO: 0.46 K/UL — LOW (ref 1–3.3)
LYMPHOCYTES # BLD AUTO: 13.1 % — SIGNIFICANT CHANGE UP (ref 13–44)
MACROCYTES BLD QL: SIGNIFICANT CHANGE UP
MAGNESIUM SERPL-MCNC: 2.2 MG/DL — SIGNIFICANT CHANGE UP (ref 1.6–2.6)
MANUAL SMEAR VERIFICATION: SIGNIFICANT CHANGE UP
MCHC RBC-ENTMCNC: 33.1 PG — SIGNIFICANT CHANGE UP (ref 27–34)
MCHC RBC-ENTMCNC: 34.2 GM/DL — SIGNIFICANT CHANGE UP (ref 32–36)
MCV RBC AUTO: 96.8 FL — SIGNIFICANT CHANGE UP (ref 80–100)
MONOCYTES # BLD AUTO: 0.46 K/UL — SIGNIFICANT CHANGE UP (ref 0–0.9)
MONOCYTES NFR BLD AUTO: 13.2 % — SIGNIFICANT CHANGE UP (ref 2–14)
NEUTROPHILS # BLD AUTO: 2.43 K/UL — SIGNIFICANT CHANGE UP (ref 1.8–7.4)
NEUTROPHILS NFR BLD AUTO: 68.4 % — SIGNIFICANT CHANGE UP (ref 43–77)
NEUTS BAND # BLD: 0.9 % — SIGNIFICANT CHANGE UP (ref 0–8)
PHOSPHATE SERPL-MCNC: 2.9 MG/DL — SIGNIFICANT CHANGE UP (ref 2.5–4.5)
PLAT MORPH BLD: NORMAL — SIGNIFICANT CHANGE UP
PLATELET # BLD AUTO: 47 K/UL — LOW (ref 150–400)
POIKILOCYTOSIS BLD QL AUTO: SLIGHT — SIGNIFICANT CHANGE UP
POTASSIUM SERPL-MCNC: 3.2 MMOL/L — LOW (ref 3.5–5.3)
POTASSIUM SERPL-MCNC: 3.6 MMOL/L — SIGNIFICANT CHANGE UP (ref 3.5–5.3)
POTASSIUM SERPL-SCNC: 3.2 MMOL/L — LOW (ref 3.5–5.3)
POTASSIUM SERPL-SCNC: 3.6 MMOL/L — SIGNIFICANT CHANGE UP (ref 3.5–5.3)
PROT SERPL-MCNC: 5.5 G/DL — LOW (ref 6–8.3)
PROT SERPL-MCNC: 6.2 G/DL — SIGNIFICANT CHANGE UP (ref 6–8.3)
PROTHROM AB SERPL-ACNC: 24 SEC — HIGH (ref 10.5–13.4)
RBC # BLD: 3.14 M/UL — LOW (ref 4.2–5.8)
RBC # FLD: 13.7 % — SIGNIFICANT CHANGE UP (ref 10.3–14.5)
RBC BLD AUTO: ABNORMAL
SODIUM SERPL-SCNC: 135 MMOL/L — SIGNIFICANT CHANGE UP (ref 135–145)
SODIUM SERPL-SCNC: 135 MMOL/L — SIGNIFICANT CHANGE UP (ref 135–145)
TARGETS BLD QL SMEAR: SLIGHT — SIGNIFICANT CHANGE UP
WBC # BLD: 3.5 K/UL — LOW (ref 3.8–10.5)
WBC # FLD AUTO: 3.5 K/UL — LOW (ref 3.8–10.5)

## 2022-06-02 PROCEDURE — 99232 SBSQ HOSP IP/OBS MODERATE 35: CPT | Mod: GC

## 2022-06-02 RX ORDER — APIXABAN 2.5 MG/1
5 TABLET, FILM COATED ORAL
Refills: 0 | Status: DISCONTINUED | OUTPATIENT
Start: 2022-06-02 | End: 2022-06-05

## 2022-06-02 RX ADMIN — SPIRONOLACTONE 100 MILLIGRAM(S): 25 TABLET, FILM COATED ORAL at 05:05

## 2022-06-02 RX ADMIN — Medication 50 MILLILITER(S): at 03:24

## 2022-06-02 RX ADMIN — PIPERACILLIN AND TAZOBACTAM 25 GRAM(S): 4; .5 INJECTION, POWDER, LYOPHILIZED, FOR SOLUTION INTRAVENOUS at 03:25

## 2022-06-02 RX ADMIN — Medication 50 MILLILITER(S): at 20:02

## 2022-06-02 RX ADMIN — Medication 50 MILLILITER(S): at 17:09

## 2022-06-02 RX ADMIN — Medication 50 MILLILITER(S): at 09:01

## 2022-06-02 RX ADMIN — Medication 15 GRAM(S): at 17:09

## 2022-06-02 RX ADMIN — Medication 20 MILLIGRAM(S): at 05:04

## 2022-06-02 RX ADMIN — PIPERACILLIN AND TAZOBACTAM 25 GRAM(S): 4; .5 INJECTION, POWDER, LYOPHILIZED, FOR SOLUTION INTRAVENOUS at 22:07

## 2022-06-02 RX ADMIN — Medication 15 GRAM(S): at 05:06

## 2022-06-02 RX ADMIN — APIXABAN 5 MILLIGRAM(S): 2.5 TABLET, FILM COATED ORAL at 17:11

## 2022-06-02 RX ADMIN — ENOXAPARIN SODIUM 100 MILLIGRAM(S): 100 INJECTION SUBCUTANEOUS at 09:27

## 2022-06-02 NOTE — PROGRESS NOTE ADULT - ASSESSMENT
56 y/o M PMHx of decompensated ETOH cirrhosis (EV, L pleural effusin, PVT? with multiple readmission in the past. Transferred from [facility] for management of decompensated liver disease and liver transplant evaluation.       Decompensated ETOH Cirrhosis:  - Hyponatremia improving: Regular diet; 1.2L fluid restriction  - Continue Lasix 20mg qd and Spironolactone 100mg po QD  - Albumin 25% q 6hrs, urea 15g bid  - Ascites: LVP 6/1 6.3L removed  - On Zosyn for SBP ppx  (elevated neutrophil count on last LVP, CT A/P neg for peritonitis)   - PVT: Change Lovenox back to Eliquis 5mg po BID  - Strict I&O  - SCDs/OOB  - Pt accepted at [facility] for transplant eval/listing pending bed availability

## 2022-06-02 NOTE — PROGRESS NOTE ADULT - SUBJECTIVE AND OBJECTIVE BOX
Transplant Surgery - Multidisciplinary Rounds  --------------------------------------------------------------  Present:   Patient seen and examined with multidisciplinary Transplant team including Surgeon: Dr. Freeman, Dr. Dagher, Hepatologist: Dr. Mcginnis, NP Abilio, unit RN during am rounds. Disciplines not in attendance will be notified of the plan    HPI: 58 y/o M PMHx of decompensated ETOH cirrhosis (EV, L pleural effusion, PVT). First decompensation in 11/2021.   Multiple admissions in the past with asictes/SOB, strep salivarius bacteremia, and SBP.     Transferred from [facility] with hyponatremia (), worsening ascites, and evaluation for liver transplant. He is a potential candidate however due to insurance issues being transferred to [facility].     Decompensated ETOH Cirrhosis  ABO: A+, MELD NA 29  - last drink 10/31/2021.   - Drank beer (5 cans 12oz) daily x 30 years   - Currently enrolled in alcohol rehab program (about 2 months)  - Ascites: yes, + h/o SBP. Last LVP 5/27 (6L)  - EV: 12/13/2021 + Grade 1 EV  - HE: none  - L pleural effusion  - PVT (dx 11/24/2021)       Interval Events:  - Remains afebrile, hemodynamically stable   - NA improving (135), remains of 1.2L fluid restriction)/Urea  - Pending transfer to [place], pending bed availability  - On Lovenox for PVT  - s/p LVP 6.3L removed      Potential Discharge date: Accepted as transfer to [place], pending bed availability  Education:  Medications  Plan of care:  See Below      MEDICATIONS  (STANDING):  albumin human 25% IVPB 100 milliLiter(s) IV Intermittent every 6 hours  apixaban 5 milliGRAM(s) Oral two times a day  furosemide    Tablet 20 milliGRAM(s) Oral daily  piperacillin/tazobactam IVPB.. 3.375 Gram(s) IV Intermittent every 8 hours  spironolactone 100 milliGRAM(s) Oral daily  urea Oral Powder 15 Gram(s) Oral two times a day    MEDICATIONS  (PRN):      PAST MEDICAL & SURGICAL HISTORY:  ETOH abuse      Alcoholic fatty liver      Thrombocytopenia concurrent with and due to alcoholism      Cirrhosis of liver with ascites      S/P abdominal paracentesis      History of tonsillectomy and adenoidectomy  as child          Vital Signs Last 24 Hrs  T(C): 37.1 (02 Jun 2022 11:34), Max: 37.1 (02 Jun 2022 11:34)  T(F): 98.7 (02 Jun 2022 11:34), Max: 98.7 (02 Jun 2022 11:34)  HR: 73 (02 Jun 2022 11:34) (57 - 78)  BP: 106/55 (02 Jun 2022 11:34) (101/67 - 116/53)  BP(mean): --  RR: 18 (02 Jun 2022 11:34) (18 - 20)  SpO2: 95% (02 Jun 2022 11:34) (93% - 96%)    I&O's Summary    01 Jun 2022 07:01  -  02 Jun 2022 07:00  --------------------------------------------------------  IN: 940 mL / OUT: 2700 mL / NET: -1760 mL    02 Jun 2022 07:01  -  02 Jun 2022 14:21  --------------------------------------------------------  IN: 240 mL / OUT: 550 mL / NET: -310 mL                              10.4   3.50  )-----------( 47       ( 02 Jun 2022 06:24 )             30.4     06-02    135  |  96  |  35<H>  ----------------------------<  112<H>  3.6   |  28  |  0.82    Ca    8.8      02 Jun 2022 06:10  Phos  2.9     06-02  Mg     2.2     06-02    TPro  5.5<L>  /  Alb  4.1  /  TBili  3.2<H>  /  DBili  x   /  AST  35  /  ALT  14  /  AlkPhos  61  06-02      Culture - Fungal, Body Fluid (collected 06-01-22 @ 16:35)  Source: .Body Fluid Peritoneal Fluid  Preliminary Report (06-02-22 @ 11:12):    Testing in progress    Culture - Body Fluid with Gram Stain (collected 06-01-22 @ 16:35)  Source: .Body Fluid Peritoneal Fluid  Gram Stain (06-01-22 @ 21:28):    No polymorphonuclear cells seen per low power field    No organisms seen per oil power field    Culture - Fungal, Body Fluid (collected 05-27-22 @ 08:40)  Source: .Body Fluid Peritoneal Fluid  Preliminary Report (05-31-22 @ 10:01):    Testing in progress    Culture - Body Fluid with Gram Stain (collected 05-27-22 @ 08:40)  Source: .Body Fluid Peritoneal Fluid  Gram Stain (05-27-22 @ 20:03):    polymorphonuclear leukocytes seen    No organisms seen per oil power field    by cytocentrifuge  Final Report (06-01-22 @ 09:54):    No growth at 5 days    Culture - Blood (collected 05-26-22 @ 20:18)  Source: .Blood None  Final Report (06-01-22 @ 01:01):    No Growth Final    Culture - Blood (collected 05-26-22 @ 20:18)  Source: .Blood None  Final Report (06-01-22 @ 01:01):    No Growth Final        REVIEW OF SYSTEMS  --------------------------------------------------------------------------------  Gen: No weight changes, fatigue, fevers/chills, weakness  Skin: No rashes  Head/Eyes/Ears/Mouth: No headache; Normal hearing; Normal vision w/o blurriness; No sinus pain/discomfort, sore throat  Respiratory: No dyspnea, cough, wheezing, hemoptysis  CV: No chest pain, PND, orthopnea  GI: No abdominal pain, diarrhea, constipation, nausea, vomiting, melena, hematochezia  : No increased frequency, dysuria, hematuria, nocturia  MSK: No joint pain/swelling; no back pain; no edema  Neuro: No dizziness/lightheadedness, weakness, seizures, numbness, tingling  Heme: No easy bruising or bleeding  Endo: No heat/cold intolerance  Psych: No significant nervousness, anxiety, stress, depression  All other systems were reviewed and are negative, except as noted.      PHYSICAL EXAM:  Constitutional: Well developed / well nourished  Eyes: icteric, PERRLA  ENMT: nc/at  Neck: Supple  Respiratory: CTA B/L  Cardiovascular: RRR  Gastrointestinal: Soft abdomen, NT, Distended  Genitourinary:  Voiding spontaneously/   Extremities: SCD's in place and working bilaterally  Vascular: Palpable dp pulses bilaterally  Neurological: A&O x3  Skin: jaundiced   Musculoskeletal: Moving all extremities  Psychiatric: Responsive

## 2022-06-03 DIAGNOSIS — E87.1 HYPO-OSMOLALITY AND HYPONATREMIA: ICD-10-CM

## 2022-06-03 DIAGNOSIS — R74.01 ELEVATION OF LEVELS OF LIVER TRANSAMINASE LEVELS: ICD-10-CM

## 2022-06-03 DIAGNOSIS — D69.6 THROMBOCYTOPENIA, UNSPECIFIED: ICD-10-CM

## 2022-06-03 DIAGNOSIS — F10.11 ALCOHOL ABUSE, IN REMISSION: ICD-10-CM

## 2022-06-03 DIAGNOSIS — I81 PORTAL VEIN THROMBOSIS: ICD-10-CM

## 2022-06-03 DIAGNOSIS — K70.31 ALCOHOLIC CIRRHOSIS OF LIVER WITH ASCITES: ICD-10-CM

## 2022-06-03 DIAGNOSIS — Z41.8 ENCOUNTER FOR OTHER PROCEDURES FOR PURPOSES OTHER THAN REMEDYING HEALTH STATE: ICD-10-CM

## 2022-06-03 DIAGNOSIS — E87.6 HYPOKALEMIA: ICD-10-CM

## 2022-06-03 LAB
ALBUMIN SERPL ELPH-MCNC: 4.4 G/DL — SIGNIFICANT CHANGE UP (ref 3.3–5)
ALP SERPL-CCNC: 59 U/L — SIGNIFICANT CHANGE UP (ref 40–120)
ALT FLD-CCNC: 14 U/L — SIGNIFICANT CHANGE UP (ref 10–45)
ANION GAP SERPL CALC-SCNC: 12 MMOL/L — SIGNIFICANT CHANGE UP (ref 5–17)
AST SERPL-CCNC: 33 U/L — SIGNIFICANT CHANGE UP (ref 10–40)
BASOPHILS # BLD AUTO: 0.02 K/UL — SIGNIFICANT CHANGE UP (ref 0–0.2)
BASOPHILS NFR BLD AUTO: 0.7 % — SIGNIFICANT CHANGE UP (ref 0–2)
BILIRUB SERPL-MCNC: 2.6 MG/DL — HIGH (ref 0.2–1.2)
BLD GP AB SCN SERPL QL: NEGATIVE — SIGNIFICANT CHANGE UP
BUN SERPL-MCNC: 21 MG/DL — SIGNIFICANT CHANGE UP (ref 7–23)
CALCIUM SERPL-MCNC: 8.8 MG/DL — SIGNIFICANT CHANGE UP (ref 8.4–10.5)
CHLORIDE SERPL-SCNC: 99 MMOL/L — SIGNIFICANT CHANGE UP (ref 96–108)
CO2 SERPL-SCNC: 27 MMOL/L — SIGNIFICANT CHANGE UP (ref 22–31)
CREAT SERPL-MCNC: 0.81 MG/DL — SIGNIFICANT CHANGE UP (ref 0.5–1.3)
EGFR: 102 ML/MIN/1.73M2 — SIGNIFICANT CHANGE UP
EOSINOPHIL # BLD AUTO: 0.15 K/UL — SIGNIFICANT CHANGE UP (ref 0–0.5)
EOSINOPHIL NFR BLD AUTO: 4.9 % — SIGNIFICANT CHANGE UP (ref 0–6)
GLUCOSE SERPL-MCNC: 83 MG/DL — SIGNIFICANT CHANGE UP (ref 70–99)
HCT VFR BLD CALC: 30.7 % — LOW (ref 39–50)
HGB BLD-MCNC: 10.3 G/DL — LOW (ref 13–17)
IMM GRANULOCYTES NFR BLD AUTO: 0.7 % — SIGNIFICANT CHANGE UP (ref 0–1.5)
INR BLD: 2.18 RATIO — HIGH (ref 0.88–1.16)
LYMPHOCYTES # BLD AUTO: 0.75 K/UL — LOW (ref 1–3.3)
LYMPHOCYTES # BLD AUTO: 24.5 % — SIGNIFICANT CHANGE UP (ref 13–44)
MAGNESIUM SERPL-MCNC: 2.1 MG/DL — SIGNIFICANT CHANGE UP (ref 1.6–2.6)
MCHC RBC-ENTMCNC: 32.9 PG — SIGNIFICANT CHANGE UP (ref 27–34)
MCHC RBC-ENTMCNC: 33.6 GM/DL — SIGNIFICANT CHANGE UP (ref 32–36)
MCV RBC AUTO: 98.1 FL — SIGNIFICANT CHANGE UP (ref 80–100)
MONOCYTES # BLD AUTO: 0.5 K/UL — SIGNIFICANT CHANGE UP (ref 0–0.9)
MONOCYTES NFR BLD AUTO: 16.3 % — HIGH (ref 2–14)
NEUTROPHILS # BLD AUTO: 1.62 K/UL — LOW (ref 1.8–7.4)
NEUTROPHILS NFR BLD AUTO: 52.9 % — SIGNIFICANT CHANGE UP (ref 43–77)
NRBC # BLD: 0 /100 WBCS — SIGNIFICANT CHANGE UP (ref 0–0)
PHOSPHATE SERPL-MCNC: 2.7 MG/DL — SIGNIFICANT CHANGE UP (ref 2.5–4.5)
PHOSPHATIDYLETHANOL (PETH) - RESULT: NEGATIVE NG/ML — SIGNIFICANT CHANGE UP
PLATELET # BLD AUTO: 51 K/UL — LOW (ref 150–400)
POTASSIUM SERPL-MCNC: 3.5 MMOL/L — SIGNIFICANT CHANGE UP (ref 3.5–5.3)
POTASSIUM SERPL-SCNC: 3.5 MMOL/L — SIGNIFICANT CHANGE UP (ref 3.5–5.3)
PROT SERPL-MCNC: 5.6 G/DL — LOW (ref 6–8.3)
PROTHROM AB SERPL-ACNC: 25.5 SEC — HIGH (ref 10.5–13.4)
RBC # BLD: 3.13 M/UL — LOW (ref 4.2–5.8)
RBC # FLD: 14.2 % — SIGNIFICANT CHANGE UP (ref 10.3–14.5)
RH IG SCN BLD-IMP: POSITIVE — SIGNIFICANT CHANGE UP
SODIUM SERPL-SCNC: 138 MMOL/L — SIGNIFICANT CHANGE UP (ref 135–145)
WBC # BLD: 3.06 K/UL — LOW (ref 3.8–10.5)
WBC # FLD AUTO: 3.06 K/UL — LOW (ref 3.8–10.5)

## 2022-06-03 PROCEDURE — 99232 SBSQ HOSP IP/OBS MODERATE 35: CPT | Mod: GC

## 2022-06-03 RX ORDER — CIPROFLOXACIN LACTATE 400MG/40ML
500 VIAL (ML) INTRAVENOUS DAILY
Refills: 0 | Status: DISCONTINUED | OUTPATIENT
Start: 2022-06-03 | End: 2022-06-05

## 2022-06-03 RX ORDER — FUROSEMIDE 40 MG
40 TABLET ORAL DAILY
Refills: 0 | Status: DISCONTINUED | OUTPATIENT
Start: 2022-06-03 | End: 2022-06-05

## 2022-06-03 RX ORDER — SPIRONOLACTONE 25 MG/1
150 TABLET, FILM COATED ORAL DAILY
Refills: 0 | Status: DISCONTINUED | OUTPATIENT
Start: 2022-06-03 | End: 2022-06-05

## 2022-06-03 RX ADMIN — Medication 500 MILLIGRAM(S): at 11:58

## 2022-06-03 RX ADMIN — Medication 15 GRAM(S): at 05:13

## 2022-06-03 RX ADMIN — APIXABAN 5 MILLIGRAM(S): 2.5 TABLET, FILM COATED ORAL at 05:13

## 2022-06-03 RX ADMIN — PIPERACILLIN AND TAZOBACTAM 25 GRAM(S): 4; .5 INJECTION, POWDER, LYOPHILIZED, FOR SOLUTION INTRAVENOUS at 05:12

## 2022-06-03 RX ADMIN — Medication 20 MILLIGRAM(S): at 05:12

## 2022-06-03 RX ADMIN — APIXABAN 5 MILLIGRAM(S): 2.5 TABLET, FILM COATED ORAL at 17:19

## 2022-06-03 RX ADMIN — SPIRONOLACTONE 100 MILLIGRAM(S): 25 TABLET, FILM COATED ORAL at 05:12

## 2022-06-03 RX ADMIN — Medication 50 MILLILITER(S): at 09:20

## 2022-06-03 RX ADMIN — Medication 50 MILLILITER(S): at 02:38

## 2022-06-03 NOTE — PROGRESS NOTE ADULT - SUBJECTIVE AND OBJECTIVE BOX
Interval Events:   NAEON, afebrile HD stable  Na improving  6/1 6.3L LVP   Accepted for transfer to [facility] Medications:  albumin human 25% IVPB 100 milliLiter(s) IV Intermittent every 6 hours  enoxaparin Injectable 80 milliGRAM(s) SubCutaneous <User Schedule>  piperacillin/tazobactam IVPB.. 3.375 Gram(s) IV Intermittent every 8 hours  urea Oral Powder 15 Gram(s) Oral two times a       ROS: All system reviewed and negative except as mentioned above.    PHYSICAL EXAM:   Vital Signs Last 24 Hrs  T(C): 37.1 (03 Jun 2022 05:19), Max: 37.3 (03 Jun 2022 01:06)  T(F): 98.8 (03 Jun 2022 05:19), Max: 99.2 (03 Jun 2022 01:06)  HR: 72 (03 Jun 2022 05:19) (70 - 88)  BP: 101/60 (03 Jun 2022 05:19) (101/60 - 120/63)  BP(mean): --  RR: 18 (03 Jun 2022 05:19) (18 - 18)  SpO2: 94% (03 Jun 2022 05:19) (94% - 96%)      GENERAL:  NAD, Appears stated age  HEENT:  NC/AT,  conjunctivae clear and pink, sclera icteric  CHEST:  CTA B/L, Normal effort  HEART:  RRR S1/S2, no JVD  ABDOMEN:  Soft, distended, non-tense, +BS, NTTP  EXTREMITIES:  No cyanosis, +1 edema  SKIN:  Warm & Dry. No rash or erythema  NEURO:  Alert, oriented, no focal deficit, no asterixis    LABS:                        10.3   3.06  )-----------( 51       ( 03 Jun 2022 07:03 )             30.7     06-03    138  |  99  |  21  ----------------------------<  83  3.5   |  27  |  0.81    Ca    8.8      03 Jun 2022 07:03  Phos  2.7     06-03  Mg     2.1     06-03    TPro  5.6<L>  /  Alb  4.4  /  TBili  2.6<H>  /  DBili  x   /  AST  33  /  ALT  14  /  AlkPhos  59  06-03    LIVER FUNCTIONS - ( 03 Jun 2022 07:03 )  Alb: 4.4 g/dL / Pro: 5.6 g/dL / ALK PHOS: 59 U/L / ALT: 14 U/L / AST: 33 U/L / GGT: x           PT/INR - ( 03 Jun 2022 07:01 )   PT: 25.5 sec;   INR: 2.18 ratio           Imaging: Images reviewed.  IMPRESSION:  Cirrhosis and portal hypertension without evidence for HCC.  Moderate to large ascites.  Short segment nonvisualization versus narrowing of the main portal vein   may be related to chronic thrombosis.

## 2022-06-03 NOTE — PROGRESS NOTE ADULT - SUBJECTIVE AND OBJECTIVE BOX
Transplant Surgery - Multidisciplinary Rounds  --------------------------------------------------------------  Present:   Patient seen and examined with multidisciplinary Transplant team including Surgeon: Dr. Freeman, Hepatologist: Dr. Mcginnis, NP Abilio, unit RN during am rounds. Disciplines not in attendance will be notified of the plan    HPI: 58 y/o M PMHx of decompensated ETOH cirrhosis (EV, L pleural effusion, PVT). First decompensation in 11/2021.   Multiple admissions in the past with asictes/SOB, strep salivarius bacteremia, and SBP.     Transferred from [facility] with hyponatremia (), worsening ascites, and evaluation for liver transplant. He is a potential candidate however due to insurance issues being transferred to [facility].     Decompensated ETOH Cirrhosis  ABO: A+, MELD NA 29  - last drink 10/31/2021.   - Drank beer (5 cans 12oz) daily x 30 years   - Currently enrolled in alcohol rehab program (about 2 months)  - Ascites: yes, + h/o SBP. Last LVP 5/27 (6L)  - EV: 12/13/2021 + Grade 1 EV  - HE: none  - L pleural effusion  - PVT (dx 11/24/2021)       Interval Events:  - Remains afebrile, hemodynamically stable   - NA normalized  - Pending transfer to [place], pending bed availability/insurance clearance  - Lovenox changed to Eliquis for PVT    Potential Discharge date: Accepted as transfer to [place], pending bed availability  Education:  Medications  Plan of care:  See Below      MEDICATIONS  (STANDING):  apixaban 5 milliGRAM(s) Oral two times a day  ciprofloxacin     Tablet 500 milliGRAM(s) Oral daily  furosemide    Tablet 40 milliGRAM(s) Oral daily  spironolactone 150 milliGRAM(s) Oral daily    MEDICATIONS  (PRN):      PAST MEDICAL & SURGICAL HISTORY:  ETOH abuse      Alcoholic fatty liver      Thrombocytopenia concurrent with and due to alcoholism      Cirrhosis of liver with ascites      S/P abdominal paracentesis      History of tonsillectomy and adenoidectomy  as child          Vital Signs Last 24 Hrs  T(C): 36.9 (03 Jun 2022 10:00), Max: 37.3 (03 Jun 2022 01:06)  T(F): 98.4 (03 Jun 2022 10:00), Max: 99.2 (03 Jun 2022 01:06)  HR: 70 (03 Jun 2022 10:00) (70 - 88)  BP: 106/54 (03 Jun 2022 10:00) (101/60 - 120/63)  BP(mean): --  RR: 18 (03 Jun 2022 10:00) (18 - 18)  SpO2: 96% (03 Jun 2022 10:00) (94% - 96%)    I&O's Summary    02 Jun 2022 07:01  -  03 Jun 2022 07:00  --------------------------------------------------------  IN: 720 mL / OUT: 3250 mL / NET: -2530 mL                              10.3   3.06  )-----------( 51       ( 03 Jun 2022 07:03 )             30.7     06-03    138  |  99  |  21  ----------------------------<  83  3.5   |  27  |  0.81    Ca    8.8      03 Jun 2022 07:03  Phos  2.7     06-03  Mg     2.1     06-03    TPro  5.6<L>  /  Alb  4.4  /  TBili  2.6<H>  /  DBili  x   /  AST  33  /  ALT  14  /  AlkPhos  59  06-03      Culture - Fungal, Body Fluid (collected 06-01-22 @ 16:35)  Source: .Body Fluid Peritoneal Fluid  Preliminary Report (06-02-22 @ 11:12):    Testing in progress    Culture - Body Fluid with Gram Stain (collected 06-01-22 @ 16:35)  Source: .Body Fluid Peritoneal Fluid  Gram Stain (06-01-22 @ 21:28):    No polymorphonuclear cells seen per low power field    No organisms seen per oil power field  Preliminary Report (06-02-22 @ 16:03):    No growth to date.        REVIEW OF SYSTEMS  --------------------------------------------------------------------------------  Gen: No weight changes, fatigue, fevers/chills, weakness  Skin: No rashes  Head/Eyes/Ears/Mouth: No headache; Normal hearing; Normal vision w/o blurriness; No sinus pain/discomfort, sore throat  Respiratory: No dyspnea, cough, wheezing, hemoptysis  CV: No chest pain, PND, orthopnea  GI: No abdominal pain, diarrhea, constipation, nausea, vomiting, melena, hematochezia  : No increased frequency, dysuria, hematuria, nocturia  MSK: No joint pain/swelling; no back pain; no edema  Neuro: No dizziness/lightheadedness, weakness, seizures, numbness, tingling  Heme: No easy bruising or bleeding  Endo: No heat/cold intolerance  Psych: No significant nervousness, anxiety, stress, depression  All other systems were reviewed and are negative, except as noted.      PHYSICAL EXAM:  Constitutional: Well developed / well nourished  Eyes: icteric, PERRLA  ENMT: nc/at  Neck: Supple  Respiratory: CTA B/L  Cardiovascular: RRR  Gastrointestinal: Soft abdomen, NT, Distended  Genitourinary:  Voiding spontaneously/   Extremities: SCD's in place and working bilaterally  Vascular: Palpable dp pulses bilaterally  Neurological: A&O x3  Skin: jaundiced   Musculoskeletal: Moving all extremities  Psychiatric: Responsive

## 2022-06-03 NOTE — PROGRESS NOTE ADULT - ASSESSMENT
56 y/o M PMHx of decompensated ETOH cirrhosis (EV, L pleural effusin, PVT? with multiple readmission in the past. Transferred from [facility] for management of decompensated liver disease and liver transplant evaluation.       Decompensated ETOH Cirrhosis:  - LFTs stable  MELD 19  - Sodium normalized.  DC fluid restriction and Urea  - Increase Lasix to 40mg qd and Spironolactone 150mg po QD  - Dc Albumin  - Ascites: LVP 6/1 6.3L removed  - DC Zosyn and start Cipro 500mg po QD for SBP ppx    - PVT: Eliquis 5mg po BID  - Strict I&O  - SCDs/OOB  - Pt accepted at [facility] for transplant eval/listing pending bed availability/insurance clearance

## 2022-06-03 NOTE — PROGRESS NOTE ADULT - ASSESSMENT
57M PMHx of decompensated ETOH cirrhosis (EV, L pleural effusion, PVT)  admitted 5/26 at [facility] with worsening back pain, abdominal pain, and distention associated with increasing ascites despite escalation of diuretics recently. Transferred to [facility] for further management of decompensated liver disease. +SBP with culture-negative neutrocytic ascites.     #Decompensated ETOH Cirrhosis,   MELD 28  - last drink 10/31/2021.   - Drank beer (5 cans 12oz) daily x 30 years   - Currently enrolled in alcohol rehab program (about 2 months)  - Ascites: yes, not on diuretics due to degree of low sodium  - EV: 12/13/2021 + Grade 1 EV. Not on BB due to SBP  - HE: none  - L pleural effusion  - PVT (dx 11/24/2021), repeat CT pending    #Left hydrothorax  #Acute hyponatremia:   #Peritonitis, SBP.  Dx para from may 27 with TNC [id number], 90 neutrophils. On zosyn from 5/29-. PMN from 6/1 para 8 --> responding   CT abd/pelvis w/ IV contrast with no intraabdominal process causing peritonitis    Recommendations:  - on cipro (switched from zosyn)  - lasix 40, spironolactone 100  - d/c albumin, urea   -daily CMP and INR  -Continue AC with lovenox  -low salt diet  -Accepted for transfer to [facility]        Recommendations preliminary until signed by attending.       Thank you for involving us in the care of this patient, please reach out if any further questions.     Rakan Chaudhari MD  Gastroenterology/Hepatology Fellow, PGY5    Available on [employer] Teams  [phone] ([facility])  [phone] ([facility])  Please contact on call fellow weekdays after 5pm-7am and weekends: [phone]       57M PMHx of decompensated ETOH cirrhosis (EV, L pleural effusion, PVT)  admitted 5/26 at [facility] with worsening back pain, abdominal pain, and distention associated with increasing ascites despite escalation of diuretics recently. Transferred to [facility] for further management of decompensated liver disease. +SBP with culture-negative neutrocytic ascites.     #Decompensated ETOH Cirrhosis,   MELD 28  - last drink 10/31/2021.   - Drank beer (5 cans 12oz) daily x 30 years   - Currently enrolled in alcohol rehab program (about 2 months)  - Ascites: yes, not on diuretics due to degree of low sodium  - EV: 12/13/2021 + Grade 1 EV. Not on BB due to SBP  - HE: none  - L pleural effusion  - PVT (dx 11/24/2021), repeat CT pending    #Left hydrothorax  #Acute hyponatremia:   #Peritonitis, SBP.  Dx para from may 27 with TNC [id number], 90 neutrophils. On zosyn from 5/29-. PMN from 6/1 para 8 --> responding   CT abd/pelvis w/ IV contrast with no intraabdominal process causing peritonitis    Recommendations:  - on cipro (switched from zosyn)  - lasix 40, spironolactone 150  - d/c albumin, urea   -daily CMP and INR  -Continue AC with lovenox  -low salt diet  -Accepted for transfer to [facility]        Recommendations preliminary until signed by attending.       Thank you for involving us in the care of this patient, please reach out if any further questions.     Rakan Chaudhari MD  Gastroenterology/Hepatology Fellow, PGY5    Available on [employer] Teams  [phone] ([facility])  [phone] ([facility])  Please contact on call fellow weekdays after 5pm-7am and weekends: [phone]

## 2022-06-04 LAB
ALBUMIN SERPL ELPH-MCNC: 4.3 G/DL — SIGNIFICANT CHANGE UP (ref 3.3–5)
ALP SERPL-CCNC: 63 U/L — SIGNIFICANT CHANGE UP (ref 40–120)
ALT FLD-CCNC: 18 U/L — SIGNIFICANT CHANGE UP (ref 10–45)
ANION GAP SERPL CALC-SCNC: 11 MMOL/L — SIGNIFICANT CHANGE UP (ref 5–17)
AST SERPL-CCNC: 38 U/L — SIGNIFICANT CHANGE UP (ref 10–40)
BASOPHILS # BLD AUTO: 0.03 K/UL — SIGNIFICANT CHANGE UP (ref 0–0.2)
BASOPHILS NFR BLD AUTO: 0.6 % — SIGNIFICANT CHANGE UP (ref 0–2)
BILIRUB SERPL-MCNC: 3 MG/DL — HIGH (ref 0.2–1.2)
BUN SERPL-MCNC: 15 MG/DL — SIGNIFICANT CHANGE UP (ref 7–23)
CALCIUM SERPL-MCNC: 9.1 MG/DL — SIGNIFICANT CHANGE UP (ref 8.4–10.5)
CHLORIDE SERPL-SCNC: 102 MMOL/L — SIGNIFICANT CHANGE UP (ref 96–108)
CO2 SERPL-SCNC: 24 MMOL/L — SIGNIFICANT CHANGE UP (ref 22–31)
CREAT SERPL-MCNC: 0.72 MG/DL — SIGNIFICANT CHANGE UP (ref 0.5–1.3)
EGFR: 105 ML/MIN/1.73M2 — SIGNIFICANT CHANGE UP
EOSINOPHIL # BLD AUTO: 0.12 K/UL — SIGNIFICANT CHANGE UP (ref 0–0.5)
EOSINOPHIL NFR BLD AUTO: 2.4 % — SIGNIFICANT CHANGE UP (ref 0–6)
GLUCOSE SERPL-MCNC: 99 MG/DL — SIGNIFICANT CHANGE UP (ref 70–99)
HCT VFR BLD CALC: 33 % — LOW (ref 39–50)
HGB BLD-MCNC: 11.2 G/DL — LOW (ref 13–17)
IMM GRANULOCYTES NFR BLD AUTO: 0.6 % — SIGNIFICANT CHANGE UP (ref 0–1.5)
INR BLD: 2.43 RATIO — HIGH (ref 0.88–1.16)
LYMPHOCYTES # BLD AUTO: 0.85 K/UL — LOW (ref 1–3.3)
LYMPHOCYTES # BLD AUTO: 16.9 % — SIGNIFICANT CHANGE UP (ref 13–44)
MAGNESIUM SERPL-MCNC: 2 MG/DL — SIGNIFICANT CHANGE UP (ref 1.6–2.6)
MCHC RBC-ENTMCNC: 33.2 PG — SIGNIFICANT CHANGE UP (ref 27–34)
MCHC RBC-ENTMCNC: 33.9 GM/DL — SIGNIFICANT CHANGE UP (ref 32–36)
MCV RBC AUTO: 97.9 FL — SIGNIFICANT CHANGE UP (ref 80–100)
MONOCYTES # BLD AUTO: 0.64 K/UL — SIGNIFICANT CHANGE UP (ref 0–0.9)
MONOCYTES NFR BLD AUTO: 12.7 % — SIGNIFICANT CHANGE UP (ref 2–14)
NEUTROPHILS # BLD AUTO: 3.36 K/UL — SIGNIFICANT CHANGE UP (ref 1.8–7.4)
NEUTROPHILS NFR BLD AUTO: 66.8 % — SIGNIFICANT CHANGE UP (ref 43–77)
NRBC # BLD: 0 /100 WBCS — SIGNIFICANT CHANGE UP (ref 0–0)
PHOSPHATE SERPL-MCNC: 3.4 MG/DL — SIGNIFICANT CHANGE UP (ref 2.5–4.5)
PLATELET # BLD AUTO: 51 K/UL — LOW (ref 150–400)
POTASSIUM SERPL-MCNC: 4.1 MMOL/L — SIGNIFICANT CHANGE UP (ref 3.5–5.3)
POTASSIUM SERPL-SCNC: 4.1 MMOL/L — SIGNIFICANT CHANGE UP (ref 3.5–5.3)
PROT SERPL-MCNC: 5.7 G/DL — LOW (ref 6–8.3)
PROTHROM AB SERPL-ACNC: 28.5 SEC — HIGH (ref 10.5–13.4)
RBC # BLD: 3.37 M/UL — LOW (ref 4.2–5.8)
RBC # FLD: 14.6 % — HIGH (ref 10.3–14.5)
SARS-COV-2 RNA SPEC QL NAA+PROBE: SIGNIFICANT CHANGE UP
SODIUM SERPL-SCNC: 137 MMOL/L — SIGNIFICANT CHANGE UP (ref 135–145)
WBC # BLD: 5.03 K/UL — SIGNIFICANT CHANGE UP (ref 3.8–10.5)
WBC # FLD AUTO: 5.03 K/UL — SIGNIFICANT CHANGE UP (ref 3.8–10.5)

## 2022-06-04 PROCEDURE — 99232 SBSQ HOSP IP/OBS MODERATE 35: CPT

## 2022-06-04 RX ADMIN — APIXABAN 5 MILLIGRAM(S): 2.5 TABLET, FILM COATED ORAL at 17:08

## 2022-06-04 RX ADMIN — Medication 40 MILLIGRAM(S): at 05:22

## 2022-06-04 RX ADMIN — Medication 500 MILLIGRAM(S): at 11:36

## 2022-06-04 RX ADMIN — SPIRONOLACTONE 150 MILLIGRAM(S): 25 TABLET, FILM COATED ORAL at 05:22

## 2022-06-04 RX ADMIN — APIXABAN 5 MILLIGRAM(S): 2.5 TABLET, FILM COATED ORAL at 05:24

## 2022-06-04 NOTE — PROGRESS NOTE ADULT - ATTENDING COMMENTS
This is a 57-year-old male with past medical history of alcohol associated cirrhosis of the liver, history of esophageal varices, left pleural effusion, portal vein thrombosis transferred from [facility] for higher level of care.  Patient also had significant hyponatremia and had SBP with culture-negative neutroscitic ascites.  Hyponatremia has now resolved and serum sodium is 137 mEq/L today.  His INR however has increased to 2.43 from 2.18.  Overall liver chemistry profile has remained stable with total bilirubin 3 mg/dL and rest of the liver enzymes including AST, ALT and alkaline phosphatase are within normal range.  Patient's model for end-stage liver disease score is 23.  Patient is awaiting transfer to [facility] for potential evaluation for liver transplantation.  However due to overall improvement at this time I recommended patient potentially can be discharged with plan for outpatient follow-up at [facility].   Continue patient on Lasix 40 mg daily and Aldactone 150 mg daily.  Okay to discontinue IV albumin.  Keep patient on Eliquis. Keep on Cipro for SBP prophylaxis.  Likely disposition home tomorrow.
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    Pending transfer to [facility] due to lack of complete insurance coverage for transplant (see prior documentation) .  increase diuretics to lasix 40 mg daily + aldactone 150 mg daily  doing well no complaints   stop urea  stop albumin infusion  hyponatremia recovered
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    Pending transfer to [facility] due to lack of complete insurance coverage for transplant (see prior documentation) .  s/p LVP today  add lasix 20 + aldactone 100  doing well no complaints
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    Pending transfer to [facility] due to lack of complete insurance coverage for transplant (see prior documentation)
58 yo M with AUD (with last reported drink 10/31/21) and decompensated ALD cirrhosis (first diagnosed in 11/2021) and complicated by refractory ascites (with last LVP on 5/27 with 6.65L removed), recurrent SBP (with ascitic fluid from 5/27 with >9k PMNs), left>right hepatic hydrothorax, hyponatremia, non-bleeding EV (with small EV on EGD on 12/13/21), and non-occlusive PVT (previously on anticoagulation with apixaban, currently on therapeutic LMWH temporarily to enable procedures), and transferred from [facility] for a higher level of care.    # SBP: CTAP yesterday with no visceral injury to suggest secondary peritonitis. Continuing Zosyn (5/29- ) and will need repeat paracentesis on 5/31 ot 6/1 to assess for response to antibiotic therapy. Blood cultures (5/26 x2) with NGTD. Continue IV albumin, as below.    # Refractory ascites and left hepatic hydrothorax: Currently holding diuretics due to acute hyponatremia. On room air. Defer thoracentesis for now. Repeat paracentesis on 5/31, as above.    # Acute hyponatremia: Improving with oral fluid restriction to 1.2L/day, albumin 25% 100 mL iv q6h, and urea 15g po bid. Na 130 today.    # Non-bleeding EV: Small on prior EGD (12/13/21). Poor candidate for NSBB currently given peritonitis and refractory ascites.    # Non-occlusive PVT: Previously on apixaban but currently on therapeutic LMWH to enable necessary procedures such as repeat paracentesis.    # Decompensated ALD cirrhosis: ABO A with MELD-Na 23 today. He is a potential transplant candidate but per transplant financial services would have a 20% copay for transplant services and post-transplant care if transplanted here at [facility] because our center does not yet have [facility]. He would not have the 20% copay if transplanted at a [facility] facility. This was discussed with Mr. Newton yesterday who is opting to proceed with transplant evaluation elsewhere. He has been accepted for inpatient transfer to [place] pending bed availability for liver transplant evaluation.    Please don't hesitate to call with any questions or concerns.    Gurjit Omalley M.D., Ph.D.  Transplant Hepatology

## 2022-06-04 NOTE — PROGRESS NOTE ADULT - NUTRITIONAL ASSESSMENT
This patient has been assessed with a concern for Malnutrition and has been determined to have a diagnosis/diagnoses of Moderate protein-calorie malnutrition.    This patient is being managed with:   Diet Regular-  Entered: Danial  3 2022  9:04AM    
This patient has been assessed with a concern for Malnutrition and has been determined to have a diagnosis/diagnoses of Moderate protein-calorie malnutrition.    This patient is being managed with:   Diet NPO-  NPO for Procedure/Test     NPO Start Date: 31-May-2022   NPO Start Time: 23:59  Except Medications  Entered: May 30 2022  2:58PM    Diet Regular-  1200mL Fluid Restriction ([id number])  Entered: May 28 2022  7:46PM    
This patient has been assessed with a concern for Malnutrition and has been determined to have a diagnosis/diagnoses of Moderate protein-calorie malnutrition.    This patient is being managed with:   Diet Regular-  Entered: Danial  3 2022  9:04AM    
This patient has been assessed with a concern for Malnutrition and has been determined to have a diagnosis/diagnoses of Moderate protein-calorie malnutrition.    This patient is being managed with:   Diet NPO-  NPO for Procedure/Test     NPO Start Date: 31-May-2022   NPO Start Time: 23:59  Except Medications  Entered: May 30 2022  2:58PM    Diet Regular-  1200mL Fluid Restriction ([id number])  Entered: May 28 2022  7:46PM    
This patient has been assessed with a concern for Malnutrition and has been determined to have a diagnosis/diagnoses of Moderate protein-calorie malnutrition.    This patient is being managed with:   Diet Regular-  1200mL Fluid Restriction ([id number])  Entered: May 28 2022  7:46PM    
This patient has been assessed with a concern for Malnutrition and has been determined to have a diagnosis/diagnoses of Moderate protein-calorie malnutrition.    This patient is being managed with:   Diet Regular-  1200mL Fluid Restriction ([id number])  Entered: May 28 2022  7:46PM

## 2022-06-04 NOTE — PROGRESS NOTE ADULT - ASSESSMENT
57M PMHx of decompensated ETOH cirrhosis (EV, L pleural effusion, PVT)  admitted 5/26 at [place] with worsening back pain, abdominal pain, and distention associated with increasing ascites despite escalation of diuretics recently. Transferred to [facility] for further management of decompensated liver disease. +SBP with culture-negative neutrocytic ascites.     #Decompensated ETOH Cirrhosis,   MELD 29 A+  - last drink 10/31/2021.   - Drank beer (5 cans 12oz) daily x 30 years   - Currently enrolled in alcohol rehab program (about 2 months)  - Ascites: yes, not on diuretics due to degree of low sodium  - EV: 12/13/2021 + Grade 1 EV. Not on BB due to SBP  - HE: none  - L pleural effusion  - PVT (dx 11/24/2021), repeat CT pending    #Left hydrothorax  #Acute hyponatremia:   #Peritonitis, SBP.  Dx para from may 27 with TNC 57375, 90 neutrophils. On zosyn from 5/29-. PMN from 6/1 para 8 --> responding   CT abd/pelvis w/ IV contrast with no intraabdominal process causing peritonitis    Recommendations:  - on cipro (switched from zosyn)  - lasix 40, spironolactone 150  - d/c albumin, urea   -daily CMP and INR  -AC lovenox switched to eliquis  -low salt diet  -Accepted for transfer to [facility] --> if not transferred by tomorrow will consider OP f/u        Recommendations preliminary until signed by attending.       Thank you for involving us in the care of this patient, please reach out if any further questions.     Rakan Chaudhari MD  Gastroenterology/Hepatology Fellow, PGY5    Available on [employer] Teams  [phone] ([facility])  [id number] ([facility])  Please contact on call fellow weekdays after 5pm-7am and weekends: [phone]

## 2022-06-04 NOTE — PROGRESS NOTE ADULT - ASSESSMENT
56 y/o M PMHx of decompensated ETOH cirrhosis (EV, L pleural effusin, PVT? with multiple readmission in the past. Transferred from [facility] for management of decompensated liver disease and liver transplant evaluation.       Decompensated ETOH Cirrhosis:  - LFTs stable  MELD 20  - Sodium normalized.  DC fluid restriction and Urea  - Continue Lasix 40mg qd and Spironolactone 150mg po QD  - Ascites: LVP 6/1 6.3L removed  - C/W Cipro 500mg po QD for SBP ppx    - PVT: Eliquis 5mg po BID  - Strict I&O  - SCDs/OOB  - Pt accepted at [facility] for transplant eval/listing pending bed availability/insurance clearance  - Discussed with the pt If no bed available in the next 24 hours and pt continue to do well will plan for DC home and outpt eval at [facility]

## 2022-06-04 NOTE — PROGRESS NOTE ADULT - NS ATTEND AMEND GEN_ALL_CORE FT
LVP done yesterday  on anticoagulation for PV thrombosis  awaiting transfer to [facility]
decompensated ETOH cirrhosis  hyponatremia resolved  awaiting transfer to [place] for transplant evaluation
ETOH cirrhosis with decompensation  hyponatremia resolved  MELD 20  ascites drained  pending transfer to [facility] for liver transplant evlauation
PVT on eliquis.  Txp eval  SBP covered w Zosyn
agree with above  for LVP today  hyponatremia improving  awaiting transfer to [facility]
unfortunately unable to complete w/u here 2/2 insurance issues.   plan to xfer to [place].
agree with above  decompensated cirrhosis with ascites and hyponatremia  also PV thrombosis  in workup for liver transplant  accepted for transfer to [facility] for insurance issues

## 2022-06-04 NOTE — PROGRESS NOTE ADULT - SUBJECTIVE AND OBJECTIVE BOX
Transplant Surgery - Multidisciplinary Rounds  --------------------------------------------------------------  Present: Patient seen and examined with Multidisciplinary Transplant team Dr. Freeman, Hepatologist Dr. Butt, [facility], CTICU staff and unit RN. Disciplines not in attendance will be notified of plan.     HPI: 56 y/o M PMHx of decompensated ETOH cirrhosis (EV, L pleural effusion, PVT). First decompensation in 11/2021.   Multiple admissions in the past with asictes/SOB, strep salivarius bacteremia, and SBP.     Transferred from [facility] with hyponatremia (), worsening ascites, and evaluation for liver transplant. He is a potential candidate however due to insurance issues being transferred to [facility].     Decompensated ETOH Cirrhosis  ABO: A+, MELD NA 29  - last drink 10/31/2021.   - Drank beer (5 cans 12oz) daily x 30 years   - Currently enrolled in alcohol rehab program (about 2 months)  - Ascites: yes, + h/o SBP. Last LVP 5/27 (6L)  - EV: 12/13/2021 + Grade 1 EV  - HE: none  - L pleural effusion  - PVT (dx 11/24/2021)       Interval Events:  - Remains afebrile, hemodynamically stable   - NA normalized  - Pending transfer to [place], pending bed availability/insurance clearance  - Lovenox changed to Eliquis for PVT    Potential Discharge date: Accepted as transfer to [place], pending bed availability  Education:  Medications  Plan of care:  See Below         MEDICATIONS  (STANDING):  apixaban 5 milliGRAM(s) Oral two times a day  ciprofloxacin     Tablet 500 milliGRAM(s) Oral daily  furosemide    Tablet 40 milliGRAM(s) Oral daily  spironolactone 150 milliGRAM(s) Oral daily    MEDICATIONS  (PRN):      PAST MEDICAL & SURGICAL HISTORY:  ETOH abuse      Alcoholic fatty liver      Thrombocytopenia concurrent with and due to alcoholism      Cirrhosis of liver with ascites      S/P abdominal paracentesis      History of tonsillectomy and adenoidectomy  as child          Vital Signs Last 24 Hrs  T(C): 36.7 (04 Jun 2022 12:46), Max: 37.3 (04 Jun 2022 05:19)  T(F): 98.1 (04 Jun 2022 12:46), Max: 99.2 (04 Jun 2022 05:19)  HR: 79 (04 Jun 2022 12:46) (70 - 79)  BP: 133/71 (04 Jun 2022 12:46) (105/66 - 133/71)  BP(mean): --  RR: 20 (04 Jun 2022 12:46) (18 - 20)  SpO2: 96% (04 Jun 2022 12:46) (93% - 98%)    I&O's Summary    03 Jun 2022 07:01  -  04 Jun 2022 07:00  --------------------------------------------------------  IN: 1180 mL / OUT: 1025 mL / NET: 155 mL                              11.2   5.03  )-----------( 51       ( 04 Jun 2022 06:32 )             33.0     06-04    137  |  102  |  15  ----------------------------<  99  4.1   |  24  |  0.72    Ca    9.1      04 Jun 2022 06:34  Phos  3.4     06-04  Mg     2.0     06-04    TPro  5.7<L>  /  Alb  4.3  /  TBili  3.0<H>  /  DBili  x   /  AST  38  /  ALT  18  /  AlkPhos  63  06-04          Culture - Fungal, Body Fluid (collected 06-01-22 @ 16:35)  Source: .Body Fluid Peritoneal Fluid  Preliminary Report (06-02-22 @ 11:12):    Testing in progress    Culture - Body Fluid with Gram Stain (collected 06-01-22 @ 16:35)  Source: .Body Fluid Peritoneal Fluid  Gram Stain (06-01-22 @ 21:28):    No polymorphonuclear cells seen per low power field    No organisms seen per oil power field  Preliminary Report (06-02-22 @ 16:03):    No growth to date.                REVIEW OF SYSTEMS  --------------------------------------------------------------------------------  Gen: No weight changes, fatigue, fevers/chills, weakness  Skin: No rashes  Head/Eyes/Ears/Mouth: No headache; Normal hearing; Normal vision w/o blurriness; No sinus pain/discomfort, sore throat  Respiratory: No dyspnea, cough, wheezing, hemoptysis  CV: No chest pain, PND, orthopnea  GI: No abdominal pain, diarrhea, constipation, nausea, vomiting, melena, hematochezia  : No increased frequency, dysuria, hematuria, nocturia  MSK: No joint pain/swelling; no back pain; no edema  Neuro: No dizziness/lightheadedness, weakness, seizures, numbness, tingling  Heme: No easy bruising or bleeding  Endo: No heat/cold intolerance  Psych: No significant nervousness, anxiety, stress, depression  All other systems were reviewed and are negative, except as noted.      PHYSICAL EXAM:  Constitutional: Well developed / well nourished  Eyes: icteric, PERRLA  ENMT: nc/at  Neck: Supple  Respiratory: CTA B/L  Cardiovascular: RRR  Gastrointestinal: Soft abdomen, NT, Distended  Genitourinary:  Voiding spontaneously/   Extremities: SCD's in place and working bilaterally  Vascular: Palpable dp pulses bilaterally  Neurological: A&O x3  Skin: jaundiced   Musculoskeletal: Moving all extremities  Psychiatric: Responsive

## 2022-06-04 NOTE — PROGRESS NOTE ADULT - SUBJECTIVE AND OBJECTIVE BOX
Chief Complaint:  Patient is a 57y old  Male who presents with a chief complaint of Decompensated liver disease (2022 14:18)      Interval Events:   NAEON, afebrile HD stable  Pending [place] transfer  Lovenox --> eliquis    Allergies:  No Known Allergies        Hospital Medications:  apixaban 5 milliGRAM(s) Oral two times a day  ciprofloxacin     Tablet 500 milliGRAM(s) Oral daily  furosemide    Tablet 40 milliGRAM(s) Oral daily  spironolactone 150 milliGRAM(s) Oral daily      PMHX/PSHX:  Fatty liver    ETOH abuse    Alcoholic fatty liver    Thrombocytopenia concurrent with and due to alcoholism    Cirrhosis of liver with ascites    S/P abdominal paracentesis    History of tonsillectomy and adenoidectomy        Family history:  No pertinent family history in first degree relatives        ROS:     General:  No wt loss, fevers, chills, night sweats, fatigue,   Eyes:  Good vision, no reported pain  ENT:  No sore throat, pain, runny nose, dysphagia  CV:  No pain, palpitations, hypo/hypertension  Pulm:  No dyspnea, cough, tachypnea, wheezing  GI:  see above  :  No pain, bleeding, incontinence, nocturia  Muscle:  No pain, weakness  Neuro:  No weakness, tingling, memory problems  Psych:  No fatigue, insomnia, mood problems, depression  Endocrine:  No polyuria, polydipsia, cold/heat intolerance  Heme:  No petechiae, ecchymosis, easy bruisability  Skin:  No rash, tattoos, scars, edema      PHYSICAL EXAM:   Vital Signs:  Vital Signs Last 24 Hrs  T(C): 36.7 (2022 12:46), Max: 37.3 (2022 05:19)  T(F): 98.1 (2022 12:46), Max: 99.2 (2022 05:19)  HR: 79 (2022 12:46) (70 - 79)  BP: 133/71 (2022 12:46) (105/66 - 133/71)  BP(mean): --  RR: 20 (2022 12:46) (18 - 20)  SpO2: 96% (2022 12:46) (93% - 98%)  Daily     Daily Weight in k.3 (2022 05:19)    GENERAL:  NAD, Appears stated age  HEENT:  NC/AT,  conjunctivae clear and pink, sclera icteric  CHEST:  CTA B/L, Normal effort  HEART:  RRR S1/S2, no JVD  ABDOMEN:  Soft, distended, non-tense, +BS, NTTP  EXTREMITIES:  No cyanosis, +1 edema  SKIN:  Warm & Dry. No rash or erythema  NEURO:  Alert, oriented, no focal deficit, no asterixis      LABS:                        11.2   5.03  )-----------( 51       ( 2022 06:32 )             33.0     Mean Cell Volume: 97.9 fl (-22 @ 06:32)    06-04    137  |  102  |  15  ----------------------------<  99  4.1   |  24  |  0.72    Ca    9.1      2022 06:34  Phos  3.4     06-04  Mg     2.0     06-04    TPro  5.7<L>  /  Alb  4.3  /  TBili  3.0<H>  /  DBili  x   /  AST  38  /  ALT  18  /  AlkPhos  63  06-04    LIVER FUNCTIONS - ( 2022 06:34 )  Alb: 4.3 g/dL / Pro: 5.7 g/dL / ALK PHOS: 63 U/L / ALT: 18 U/L / AST: 38 U/L / GGT: x           PT/INR - ( 2022 06:37 )   PT: 28.5 sec;   INR: 2.43 ratio                                     11.2   5.03  )-----------( 51       ( 2022 06:32 )             33.0                         10.3   3.06  )-----------( 51       ( 2022 07:03 )             30.7                         10.4   3.50  )-----------( 47       ( 2022 06:24 )             30.4       Imaging:

## 2022-06-04 NOTE — PROGRESS NOTE ADULT - REASON FOR ADMISSION
Decompensated liver disease

## 2022-06-04 NOTE — PROGRESS NOTE ADULT - PROVIDER SPECIALTY LIST ADULT
Transplant Hepatology
Transplant Surgery
Transplant Surgery
Transplant Hepatology
Transplant Surgery

## 2022-06-05 ENCOUNTER — TRANSCRIPTION ENCOUNTER (OUTPATIENT)
Age: 57
End: 2022-06-05

## 2022-06-05 VITALS
DIASTOLIC BLOOD PRESSURE: 64 MMHG | TEMPERATURE: 98 F | SYSTOLIC BLOOD PRESSURE: 108 MMHG | HEART RATE: 72 BPM | OXYGEN SATURATION: 95 % | RESPIRATION RATE: 18 BRPM

## 2022-06-05 LAB
ALBUMIN SERPL ELPH-MCNC: 4 G/DL — SIGNIFICANT CHANGE UP (ref 3.3–5)
ALP SERPL-CCNC: 63 U/L — SIGNIFICANT CHANGE UP (ref 40–120)
ALT FLD-CCNC: 18 U/L — SIGNIFICANT CHANGE UP (ref 10–45)
ANION GAP SERPL CALC-SCNC: 11 MMOL/L — SIGNIFICANT CHANGE UP (ref 5–17)
AST SERPL-CCNC: 45 U/L — HIGH (ref 10–40)
BILIRUB SERPL-MCNC: 2.8 MG/DL — HIGH (ref 0.2–1.2)
BUN SERPL-MCNC: 14 MG/DL — SIGNIFICANT CHANGE UP (ref 7–23)
CALCIUM SERPL-MCNC: 9.2 MG/DL — SIGNIFICANT CHANGE UP (ref 8.4–10.5)
CHLORIDE SERPL-SCNC: 103 MMOL/L — SIGNIFICANT CHANGE UP (ref 96–108)
CO2 SERPL-SCNC: 22 MMOL/L — SIGNIFICANT CHANGE UP (ref 22–31)
CREAT SERPL-MCNC: 0.73 MG/DL — SIGNIFICANT CHANGE UP (ref 0.5–1.3)
EGFR: 105 ML/MIN/1.73M2 — SIGNIFICANT CHANGE UP
GLUCOSE SERPL-MCNC: 98 MG/DL — SIGNIFICANT CHANGE UP (ref 70–99)
HCT VFR BLD CALC: 32.1 % — LOW (ref 39–50)
HGB BLD-MCNC: 10.8 G/DL — LOW (ref 13–17)
INR BLD: 2.67 RATIO — HIGH (ref 0.88–1.16)
MAGNESIUM SERPL-MCNC: 1.9 MG/DL — SIGNIFICANT CHANGE UP (ref 1.6–2.6)
MCHC RBC-ENTMCNC: 33 PG — SIGNIFICANT CHANGE UP (ref 27–34)
MCHC RBC-ENTMCNC: 33.6 GM/DL — SIGNIFICANT CHANGE UP (ref 32–36)
MCV RBC AUTO: 98.2 FL — SIGNIFICANT CHANGE UP (ref 80–100)
MELD SCORE WITH DIALYSIS: 35 POINTS — SIGNIFICANT CHANGE UP
MELD SCORE WITHOUT DIALYSIS: 22 POINTS — SIGNIFICANT CHANGE UP
NRBC # BLD: 0 /100 WBCS — SIGNIFICANT CHANGE UP (ref 0–0)
PHOSPHATE SERPL-MCNC: 3.8 MG/DL — SIGNIFICANT CHANGE UP (ref 2.5–4.5)
PLATELET # BLD AUTO: 54 K/UL — LOW (ref 150–400)
POTASSIUM SERPL-MCNC: 4.4 MMOL/L — SIGNIFICANT CHANGE UP (ref 3.5–5.3)
POTASSIUM SERPL-SCNC: 4.4 MMOL/L — SIGNIFICANT CHANGE UP (ref 3.5–5.3)
PROT SERPL-MCNC: 5.6 G/DL — LOW (ref 6–8.3)
PROTHROM AB SERPL-ACNC: 31 SEC — HIGH (ref 10.5–13.4)
RBC # BLD: 3.27 M/UL — LOW (ref 4.2–5.8)
RBC # FLD: 14.7 % — HIGH (ref 10.3–14.5)
SODIUM SERPL-SCNC: 136 MMOL/L — SIGNIFICANT CHANGE UP (ref 135–145)
WBC # BLD: 3.49 K/UL — LOW (ref 3.8–10.5)
WBC # FLD AUTO: 3.49 K/UL — LOW (ref 3.8–10.5)

## 2022-06-05 PROCEDURE — 83615 LACTATE (LD) (LDH) ENZYME: CPT

## 2022-06-05 PROCEDURE — 49083 ABD PARACENTESIS W/IMAGING: CPT

## 2022-06-05 PROCEDURE — P9047: CPT

## 2022-06-05 PROCEDURE — 83935 ASSAY OF URINE OSMOLALITY: CPT

## 2022-06-05 PROCEDURE — 84300 ASSAY OF URINE SODIUM: CPT

## 2022-06-05 PROCEDURE — 87075 CULTR BACTERIA EXCEPT BLOOD: CPT

## 2022-06-05 PROCEDURE — 97161 PT EVAL LOW COMPLEX 20 MIN: CPT

## 2022-06-05 PROCEDURE — C1729: CPT

## 2022-06-05 PROCEDURE — 88112 CYTOPATH CELL ENHANCE TECH: CPT

## 2022-06-05 PROCEDURE — 89051 BODY FLUID CELL COUNT: CPT

## 2022-06-05 PROCEDURE — 85025 COMPLETE CBC W/AUTO DIFF WBC: CPT

## 2022-06-05 PROCEDURE — 88305 TISSUE EXAM BY PATHOLOGIST: CPT

## 2022-06-05 PROCEDURE — 82042 OTHER SOURCE ALBUMIN QUAN EA: CPT

## 2022-06-05 PROCEDURE — 85610 PROTHROMBIN TIME: CPT

## 2022-06-05 PROCEDURE — 87205 SMEAR GRAM STAIN: CPT

## 2022-06-05 PROCEDURE — 84157 ASSAY OF PROTEIN OTHER: CPT

## 2022-06-05 PROCEDURE — 86900 BLOOD TYPING SEROLOGIC ABO: CPT

## 2022-06-05 PROCEDURE — G0480: CPT

## 2022-06-05 PROCEDURE — U0005: CPT

## 2022-06-05 PROCEDURE — 83735 ASSAY OF MAGNESIUM: CPT

## 2022-06-05 PROCEDURE — 84100 ASSAY OF PHOSPHORUS: CPT

## 2022-06-05 PROCEDURE — U0003: CPT

## 2022-06-05 PROCEDURE — 86901 BLOOD TYPING SEROLOGIC RH(D): CPT

## 2022-06-05 PROCEDURE — 80053 COMPREHEN METABOLIC PANEL: CPT

## 2022-06-05 PROCEDURE — 74177 CT ABD & PELVIS W/CONTRAST: CPT

## 2022-06-05 PROCEDURE — 87102 FUNGUS ISOLATION CULTURE: CPT

## 2022-06-05 PROCEDURE — 86850 RBC ANTIBODY SCREEN: CPT

## 2022-06-05 PROCEDURE — 87070 CULTURE OTHR SPECIMN AEROBIC: CPT

## 2022-06-05 PROCEDURE — 36415 COLL VENOUS BLD VENIPUNCTURE: CPT

## 2022-06-05 PROCEDURE — 82945 GLUCOSE OTHER FLUID: CPT

## 2022-06-05 RX ORDER — FUROSEMIDE 40 MG
1 TABLET ORAL
Qty: 35 | Refills: 0
Start: 2022-06-05 | End: 2022-07-09

## 2022-06-05 RX ORDER — APIXABAN 2.5 MG/1
1 TABLET, FILM COATED ORAL
Qty: 70 | Refills: 0
Start: 2022-06-05 | End: 2022-07-09

## 2022-06-05 RX ORDER — MAGNESIUM ASPARTATE HCL 61 MG(615)
400 TABLET, DELAYED RELEASE (ENTERIC COATED) ORAL
Qty: 0 | Refills: 0 | DISCHARGE

## 2022-06-05 RX ORDER — CIPROFLOXACIN LACTATE 400MG/40ML
1 VIAL (ML) INTRAVENOUS
Qty: 35 | Refills: 0
Start: 2022-06-05 | End: 2022-07-09

## 2022-06-05 RX ORDER — SPIRONOLACTONE 25 MG/1
3 TABLET, FILM COATED ORAL
Qty: 90 | Refills: 0
Start: 2022-06-05 | End: 2022-07-04

## 2022-06-05 RX ADMIN — APIXABAN 5 MILLIGRAM(S): 2.5 TABLET, FILM COATED ORAL at 05:24

## 2022-06-05 RX ADMIN — Medication 500 MILLIGRAM(S): at 08:46

## 2022-06-05 RX ADMIN — SPIRONOLACTONE 150 MILLIGRAM(S): 25 TABLET, FILM COATED ORAL at 05:25

## 2022-06-05 RX ADMIN — Medication 40 MILLIGRAM(S): at 05:24

## 2022-06-05 NOTE — DISCHARGE NOTE NURSING/CASE MANAGEMENT/SOCIAL WORK - NSDCPEFALRISK_GEN_ALL_CORE
For information on Fall & Injury Prevention, visit: [email or web address] OR  [email or web address] OR  https://www.cdc.gov/steadi/patient.html

## 2022-06-05 NOTE — DISCHARGE NOTE NURSING/CASE MANAGEMENT/SOCIAL WORK - PATIENT PORTAL LINK FT
You can access the FollowMyHealth Patient Portal offered by [facility] by registering at the following website: [email or web address]. By joining [employer]’s FollowMyHealth portal, you will also be able to view your health information using other applications (apps) compatible with our system.

## 2022-06-06 LAB
CULTURE RESULTS: SIGNIFICANT CHANGE UP
SPECIMEN SOURCE: SIGNIFICANT CHANGE UP

## 2022-06-07 LAB — NON-GYNECOLOGICAL CYTOLOGY STUDY: SIGNIFICANT CHANGE UP

## 2022-06-08 ENCOUNTER — NON-APPOINTMENT (OUTPATIENT)
Age: 57
End: 2022-06-08

## 2022-06-08 LAB
CULTURE RESULTS: SIGNIFICANT CHANGE UP
SPECIMEN SOURCE: SIGNIFICANT CHANGE UP

## 2022-06-18 ENCOUNTER — TRANSCRIPTION ENCOUNTER (OUTPATIENT)
Age: 57
End: 2022-06-18

## 2022-06-21 ENCOUNTER — LABORATORY RESULT (OUTPATIENT)
Age: 57
End: 2022-06-21

## 2022-06-21 ENCOUNTER — APPOINTMENT (OUTPATIENT)
Dept: HEPATOLOGY | Facility: CLINIC | Age: 57
End: 2022-06-21
Payer: COMMERCIAL

## 2022-06-21 VITALS
TEMPERATURE: 97.2 F | RESPIRATION RATE: 12 BRPM | BODY MASS INDEX: 27.59 KG/M2 | HEIGHT: 74 IN | WEIGHT: 215 LBS | OXYGEN SATURATION: 100 % | HEART RATE: 68 BPM | DIASTOLIC BLOOD PRESSURE: 73 MMHG | SYSTOLIC BLOOD PRESSURE: 113 MMHG

## 2022-06-21 PROCEDURE — 99214 OFFICE O/P EST MOD 30 MIN: CPT

## 2022-06-21 RX ORDER — EPLERENONE 50 MG/1
50 TABLET, COATED ORAL DAILY
Qty: 30 | Refills: 1 | Status: DISCONTINUED | COMMUNITY
Start: 2022-03-17 | End: 2022-06-21

## 2022-06-21 NOTE — HISTORY OF PRESENT ILLNESS
[FreeTextEntry1] : Mr. MARIMAR SMART a 56 year White male  presents today for  a hepatology appointment. He has been a patient  [facility] and has been referred to us by Alverto Maya, gastroenterologist.\par \par History based on clinic visit on 17 February 2022:\par \par Patient has known diagnosis of alcohol associated cirrhosis of the liver with decompensation in the form of ascites and bilateral lower extremity edema.  Patient lives long history of alcohol use disorder, currently has drank more than 30 years.  He quit his drinking 31 October 2021 after becoming sick with progressive abdominal distention and lower extremity swelling.  He was admitted at [facility] with the same complaint.  He was also diagnosed with bacteremia at that time that was treated with IV antibiotics.  He reports having 2 large volume paracentesis the last one was December 30, 2021 with removal of about 30 L of ascitic fluid.  Patient has been on Lasix 40 mg daily and Aldactone 100 mg daily presently with complete resolution of ascites although still has some bilateral lower extremity edema which has been better over time.  He was also diagnosed with portal vein thrombosis during his hospitalization in October 2021.  Since then he has been on Eliquis.\par \par He had a CAT scan of the abdomen done during his hospitalization in October 2021 that revealed- findings suggesting cirrhosis with portal hypertension and large amount of ascites. Portal vein thrombosis is again noted. Moderate left and mild right pleural effusions which have not significantly changed. Atelectatic lung. No focal consolidation seen.\par \par Ascites has resolved but still has edema in both lower extremities along with chronic skin changes.  He denies any other medical problems.\par \par His family history significant for breast cancer in her mother and her father passed away at the age of 95.  No family history of liver disease.\par \par Social history: Patient reportedly drinking alcohol since very early age and has been drinking for more than 30 years mostly beers about 4-6 beers a day.  Used to drink most of the days.  He denies any DUIs.  He reportedly using marijuana on and off and the last use was in November 2021. 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  He has 2 daughters aged 25 years and 24 years and 1 son has a 20 years.\par \par Recent blood test results from January 5, 2022 was reviewed.  This revealed white cell count of 5.09, hemoglobin 13.6, per deciliter, platelet count 75,000.  CMP revealed a serum sodium 131, potassium 5.0.  Serum creatinine was 0.84 mg/dL.  Liver test revealed total bilirubin 2.2 mg/dL, AST 69, ALT 40, alkaline phosphatase 131.  INR at that time was 1.81.  His calculated meld sodium score at that time was 20.\par \par He had an upper endoscopy on December 30, 2021 that revealed grade 1 esophageal varices.  Mild gastritis was noted.  No biopsies were performed.\par \par Interval history dated March 17, 2022:\hakan \hakan Patient presents for hepatology follow-up appointment.  He was last seen in hepatology clinic on 17 February 2022.  He reports he has been doing well since his last clinic visit.  No recent hospitalization following his last clinic visit.  He remains on Lasix 40 mg daily and Aldactone 100 mg daily with excellent control of his ascites and minimal lower extremity edema.  He remains on Eliquis 5 mg daily for his portal vein thrombosis.  He is awaiting ultrasound of the abdomen for follow-up evaluation.\par \par Patient has not yet joined an alcohol last prevention program, and mentioned that he has been trying to reach out to [facility] addiction program and has left several messages but has not gotten back an answer from them.\par \par We reviewed laboratory test from last clinic visit on 17 February 2022.  This revealed INR 1.41, sodium 131, potassium 5.0 mmol/L.  Serum creatinine 0.78 mg/dL.  Liver tests are elevated with total bilirubin 2.4 mg/dL, AST 85, ALT 52, alkaline phosphatase 122 units/L.  CBC revealed a white cell count of 5.28, hemoglobin 13.7 g/dL, platelet count 70,000.  Hepatitis B surface antigen was nonreactive, hepatitis B core antibody total nonreactive, hepatitis C antibody nonreactive, hepatitis A IgG antibody reactive, hepatitis B surface antibody nonreactive.\par \par \par Interval Hx dated June 21, 2022\par \hakan Patient was recently hospitalized at [facility], transferred from [facility] for high MELD  Na score. He also had sever hyponatremia at that time. He is currently on Lasix 40 mg and Aldactone 150 mg PO qd. Unable to evaluate for LT at [facility] due to high copay. He is on Eliquis 5 mg PO qd. He also remains on Cipro 500 mg PO qd. \par \par CT showed-Cirrhosis and portal hypertension without evidence for HCC. Moderate to large ascites.Short segment nonvisualization versus narrowing of the main portal vein may be related to chronic thrombosis.

## 2022-06-21 NOTE — REVIEW OF SYSTEMS
[Fever] : no fever [Chills] : no chills [Fatigue] : no fatigue [Night Sweats] : no night sweats [Recent Weight Gain (___ Lbs)] : no recent weight gain [Sore throat] : no sore throat [Sclera anicteric] : sclera anicteric [Chest Pain] : no chest pain [Palpitations] : no palpitations [SOB] : no shortness of breath [Wheezing] : no wheezing [Cough] : no cough [Dyspnea on Exertion] : no dyspnea on exertion [Pleuritic Chest Pain] : no pleuritic chest pain [Negative] : Heme/Lymph

## 2022-06-21 NOTE — PHYSICAL EXAM
[General Appearance - Alert] : alert [General Appearance - In No Acute Distress] : in no acute distress [Sclera] : the sclera and conjunctiva were normal [PERRL With Normal Accommodation] : pupils were equal in size, round, and reactive to light [Extraocular Movements] : extraocular movements were intact [Outer Ear] : the ears and nose were normal in appearance [Oropharynx] : the oropharynx was normal [Neck Appearance] : the appearance of the neck was normal [Neck Cervical Mass (___cm)] : no neck mass was observed [Jugular Venous Distention Increased] : there was no jugular-venous distention [Thyroid Diffuse Enlargement] : the thyroid was not enlarged [Thyroid Nodule] : there were no palpable thyroid nodules [Auscultation Breath Sounds / Voice Sounds] : lungs were clear to auscultation bilaterally [Heart Rate And Rhythm] : heart rate was normal and rhythm regular [Heart Sounds] : normal S1 and S2 [Heart Sounds Gallop] : no gallops [Murmurs] : no murmurs [Heart Sounds Pericardial Friction Rub] : no pericardial rub [Bowel Sounds] : normal bowel sounds [Abdomen Soft] : soft [Abdomen Tenderness] : non-tender [] : no hepato-splenomegaly [Abdomen Mass (___ Cm)] : no abdominal mass palpated [FreeTextEntry1] : Ascites +, Last para in June 2022 [Deep Tendon Reflexes (DTR)] : deep tendon reflexes were 2+ and symmetric [Sensation] : the sensory exam was normal to light touch and pinprick [Oriented To Time, Place, And Person] : oriented to person, place, and time [Impaired Insight] : insight and judgment were intact [Affect] : the affect was normal [Well Nourished] : well nourished [Healthy Appearing] : healthy appearing [No Acute Distress] : no acute distress [Scleral Icterus] : scleral icterus [EOMI] : extra ocular movement intact [Normal Hearing] : normal hearing [Normal Oropharynx] : normal oropharynx [Hepatojugular Reflux] : no hepatojugular reflux [No Neck Mass] : no neck mass [Supple] : the neck was supple [No JVD] : no jugular venous distention [No Respiratory Distress] : no respiratory distress [No Accessory Muscle Use] : no accessory muscle use [Clear to Auscultation] : lungs were clear to auscultation bilaterally [Normal S1, S2] : normal S1 and S2 [No Murmurs] : no murmurs heard [Normal Rate/Rhythm] : normal rate/rhythm [Ascites Fluid Wave] : no ascites fluid wave [Splenomegaly] : no splenomegaly [Ascites Shifting Dullness] : ascites shifting dullness [Liver Size: ___cm] : Liver size: [unfilled] cm [Normal Bowel Sounds] : normal bowel sounds [Non Tender] : non-tender [Soft] : soft [No CVA Tenderness] : no ~M costovertebral angle tenderness [No Spinal Tenderness] : no spinal tenderness [Normal Gait] : normal gait [Normal Strength/Tone] : muscle strength and tone were normal [Spider Angioma] : no spider angioma [Jaundice] : no jaundice [Palmar Erythema] : no palmar erythema [Normal Color/Pigmentation] : normal color/pigmentation [No Rash] : no rash [Asterixis] : no asterixis [No Focal Deficits] : no focal deficits [Normal Reflexes] : normal reflexes [No Motor Deficits] : no motor deficits [Normal Affect] : normal affect [Remote Memory Intact] : remote memory intact [Normal Insight/Judgement] : normal insight/judgement [de-identified] : Edema +

## 2022-06-21 NOTE — ASSESSMENT
[FreeTextEntry1] : This is a 56-year-old gentleman with known history of alcohol use disorder and alcohol associated cirrhosis of the liver with decompensation in the form of ascites and bilateral lower extremity edema.  Prior history of jaundice which appears to have resolved over time.  He is currently abstinent from alcohol since October 31, 2021.  His ascites better controlled, last para in early June 2022 (7 L).  No prior history of hepatic encephalopathy or gastrointestinal bleeding.  He does have portal hypertension with small esophageal varices.  He also has portal vein thrombosis and remains on Eliquis.\par \par PLAN\par #1.  Cirrhosis of the liver\par I discussed the meaning of cirrhosis with the patient. I reviewed the natural history of the disease. I explained the risks for the development of esophageal varices with and without bleeding, hepatic encephalopathy, ascites, hepatocellular carcinoma, and liver failure. I have explained that disease can progress to the point of requiring an evaluation for liver transplantation. I have explained the need for imaging every 6 months to screen for liver cancer.\par I recommended follow-up labs today that will include CBC, CMP, PT/INR, AFP.\par \par #2.  Ascites\par He will continue with Lasix 40 mg daily and Aldactone 150 mg daily. Mr. SMART was counseled to adhere to a low sodium (<2 grams of sodium per day) diet by: avoiding adding any salt to meals (removing the salt shaker from the table); eliminating salty foods from his diet; eating more home-cooked meals; choosing fresh or frozen, not canned, vegetables and fruits; and reading ingredient and food labels to choose low sodium foods.\par \par #3.  Esophageal varices\par Patient had endoscopy in December 2021 which revealed small grade 1 esophageal varices.  He does not require beta-blocker therapy at this time.  \par \par #4.  Portal vein thrombosis\par Continue with Eliquis 5 mg daily.  He is awaiting a follow-up ultrasound of the abdomen with Doppler study to reevaluate his portal vein thrombosis.\par \par #5.  Colon cancer screening\par Patient reports he has been scheduled a colonoscopy through his gastroenterologist Dr. Maya.\par \par #6.  Alcohol use disorder\par Patient has history of alcohol use disorder but remains abstinent from alcohol since October 2021.  I have recommended patient to join an alcohol relapse prevention program in order to reinforce his sobriety.  Patient appears to be motivated, however he has not yet joined the program.  He is enrolled in the [facility] program (3 months)\par \par #6.  Transplant candidacy\par Patient is an excellent candidate for potential evaluation for liver transplantation.  However clinically he appears to be improving with alcohol abstinence.  He has recent hospitalization with hyponatremia, ascites. Unfortunately, we are unable to evaluate for LT at [facility] due to high copay. Need to go to a [facility] facility. I will refer him to [facility]. \par \par Return to hepatology clinic in about 3 months.

## 2022-06-22 LAB
ALBUMIN SERPL ELPH-MCNC: 4.2 G/DL
ALP BLD-CCNC: 93 U/L
ALT SERPL-CCNC: 27 U/L
ANION GAP SERPL CALC-SCNC: 9 MMOL/L
AST SERPL-CCNC: 47 U/L
BASOPHILS # BLD AUTO: 0 K/UL
BASOPHILS NFR BLD AUTO: 0 %
BILIRUB SERPL-MCNC: 2 MG/DL
BUN SERPL-MCNC: 17 MG/DL
CALCIUM SERPL-MCNC: 9.5 MG/DL
CHLORIDE SERPL-SCNC: 103 MMOL/L
CO2 SERPL-SCNC: 24 MMOL/L
CREAT SERPL-MCNC: 0.8 MG/DL
EGFR: 102 ML/MIN/1.73M2
EOSINOPHIL # BLD AUTO: 0.16 K/UL
EOSINOPHIL NFR BLD AUTO: 5 %
GLUCOSE SERPL-MCNC: 102 MG/DL
HCT VFR BLD CALC: 33.4 %
HGB BLD-MCNC: 11.2 G/DL
INR PPP: 1.99 RATIO
LYMPHOCYTES # BLD AUTO: 0.64 K/UL
LYMPHOCYTES NFR BLD AUTO: 20 %
MAN DIFF?: NORMAL
MCHC RBC-ENTMCNC: 33 PG
MCHC RBC-ENTMCNC: 33.5 GM/DL
MCV RBC AUTO: 98.5 FL
MONOCYTES # BLD AUTO: 0.51 K/UL
MONOCYTES NFR BLD AUTO: 16 %
NEUTROPHILS # BLD AUTO: 1.88 K/UL
NEUTROPHILS NFR BLD AUTO: 59 %
PLATELET # BLD AUTO: 65 K/UL
POTASSIUM SERPL-SCNC: 5 MMOL/L
PROT SERPL-MCNC: 6.1 G/DL
PT BLD: 23.3 SEC
RBC # BLD: 3.39 M/UL
RBC # FLD: 16 %
SODIUM SERPL-SCNC: 137 MMOL/L
WBC # FLD AUTO: 3.19 K/UL

## 2022-06-25 LAB
CULTURE RESULTS: SIGNIFICANT CHANGE UP
SPECIMEN SOURCE: SIGNIFICANT CHANGE UP

## 2022-06-27 LAB
ALPHA-1-FETOPROTEIN-L3: NORMAL %
ALPHA-1-FETOPROTEIN: 4.5 NG/ML

## 2022-06-29 LAB
CULTURE RESULTS: SIGNIFICANT CHANGE UP
SPECIMEN SOURCE: SIGNIFICANT CHANGE UP

## 2022-07-02 LAB
CULTURE RESULTS: SIGNIFICANT CHANGE UP
SPECIMEN SOURCE: SIGNIFICANT CHANGE UP

## 2022-08-14 NOTE — H&P ADULT - HISTORY OF PRESENT ILLNESS
SURGERY
55 y/o M PMHx significant for alcoholic fatty liver, history of drinking beer on a daily basis for last 30 years (last drink was reportedly on 10/31/2021)  who was admitted from 11/23 - 11/25/2021 after being evaluated by his PCP for symptoms of increased abdominal distention, anasarca shortness of breath, scleral icterus, and a 40 pound weight gain c/b scrotal edema in the course of one month. CTAP in the ED at the time revealed large ascites with moderate left pleural effusion and questionable partial thrombosis of portal vein. LFTs were elevated. The patient was admitted and underwent a diagnostic and therapeutic paracentesis; had 7.3 liters removed and was given 2 units albumin post paracentesis. The patient was evaluated by GI, underwent an MR of the abdomen to further evaluate for portal vein thrombosis. The case was reportedly d/w Radiology and GI and given likely chronic appearance of partial portal vein thrombosis the  plan was to discharge without anticoagulation until EGD was done to assess for variceal disease.  Today the patient was referred to the [facility] by his Gastroenterologist for further evaluation and management of progressively worsening ascites and abdominal pain which began yesterday. Of note the patient is now s/p diagnostic and therapeutic paracentesis by Interventional Radiology. Per Gastroenterology additional plans are for MRI/endoscopy to further assess for portal vein thrombosis.     Labs => PLT 91, INR 1.65,LA 5.1 -> 3.1, Alb 2.2, TBili 6.0, , AST 59, proBNP 185, TnI 24.36  CT Chest/ABD/Pelvis => Findings suggesting cirrhosis with portal hypertension and large amount   of ascites. Portal vein thrombosis is again noted. Moderate left and mild right pleural effusions which have not significantly changed. Atelectatic lung. No focal consolidation seen. In the ED the patient was given Dzieokmtvzz2y IVPB x 1, Vancomycin 1750mg IVPB x 1, Ibuprofen 600mg PO x 1, and NS x 1L.  MR Abdomen w/w/o =>  Nondiagnostic exam for portal vein thrombosis secondary to artifact from large ascites and patient unable to maintain adequate breath holds. However, in conjunction with prior CT, findings likely represent nonocclusive chronic portal vein thrombosis. Cirrhosis, large ascites, varices.

## 2022-10-05 NOTE — PATIENT PROFILE ADULT - FUNCTIONAL ASSESSMENT - DAILY ACTIVITY ASSESSMENT TYPE
[facility] GERIATRICS    PRIMARY CARE PROVIDER AND CLINIC:  Mara Murillo MD, [address] [facility] / [facility] [address]  Chief Complaint   Patient presents with     Hospital F/U      [place] Medical Record Number:  [id number]  Place of Service where encounter took place:  [facility] (TCU) [[id number]]    Julisa Chaney  is a 75 year old  (1947), admitted to the above facility from  [facility]. Hospital stay 07/27/22 through 10/04/22.  HPI:    75 year old female PMH trigeminal neuralgia hospitalized 07/22 for severe sepsis due to COVID-19 infection and bacterial pneumonia, acute and prolonged metabolic encephalopathy. Head CT 7/25/2022 showed a possible left frontal mass causing vasogenic edema. MRI 7/27/2022 showed possible left intracerebral abscess with entriculitis versus neoplasm. Treated with IV vancomycin, cefepime and metronidazole. Underwent left frontal ventriculostomy on 7/31/2022. Developed C. difficile while on antibiotics requiring rectal tube and a prolonged vanco course. Significant dysphagia and s/p g-tube 8/16. Mental status, dysphagia improving, feeding tube removed 10/3.  pain managed with Tylenol 3. Trigeminal neuralgia: on levetiracetam, nortriptyline, oxcarbazepine, topiramate. C diff: treated with vanco, cholestyramine (now stopped). Anemia: Hgb 11.7 at admission, treated with blood transfusion x 1 for Hgb 6.9 and IV iron, now stable 10 range. Depression/anxiety, suicidal thoughts and agitation, saw psych and now on PRN gabapentin 100 mg TID; quetiapine 25 mg BID and 50 mg at bedtime; PRN quetiapine 12.5 mg BID. Back pain managed with lidocaine patches, tylenol with codeine. GERD: on PPI. Electrolyte imbalance replaced. To TCU for rehab.     Patient seen for initial TCU visit. Reports fatigue. Occasional headaches and dizziness.  No chest pain, dyspnea, bowel or bladder issues. Temp 99.3 at admission, afebrile at this time. Since admission BP range  101-104/64-66 and sats 95% room air. HR . Reviewed code status - full code desired.      CODE STATUS/ADVANCE DIRECTIVES DISCUSSION:  Prior  CPR/Full code   ALLERGIES:   Allergies   Allergen Reactions     Contrast [Iohexol] Rash     Noticed during 08/2020 admission. Received IV contrast on 8/5     Chocolate Headache     Penicillins Rash      PAST MEDICAL HISTORY:   Past Medical History:   Diagnosis Date     Aspiration pneumonia (H) 02/2022     Depression      High cholesterol      Migraines      Pyloric ulcer, chronic      Sepsis (H) 08/10/2020     Trigeminal neuralgia       PAST SURGICAL HISTORY:   has a past surgical history that includes Colonoscopy w/wo Brush **Performed** (N/A, 8/13/2020); Pr Esophagogastroduodenoscopy Transoral Diagnostic (N/A, 8/13/2020); Hysterectomy; Pr Esophagogastroduodenoscopy Transoral Diagnostic (N/A, 8/14/2020); PICC/Midline Placement (7/22/2022); Ventriculostomy (Left, 7/31/2022); and IR Gastrostomy Tube Percutaneous Plcmnt (8/16/2022).  FAMILY HISTORY: family history includes Breast Cancer in her maternal aunt, mother, sister, and sister; Cancer in her father; Testicular cancer (age of onset: 17.00) in her grandchild.  SOCIAL HISTORY:   reports that she quit smoking about 7 years ago. She has a 50.00 pack-year smoking history. She has never used smokeless tobacco. She reports that she does not drink alcohol and does not use drugs.  Patient's living condition: lives with family, grandchildren     Post Discharge Medication Reconciliation Status:   Post Medication Reconciliation Status: Discharge medications reconciled, continue medications without change    Current Outpatient Medications   Medication Sig     acetaminophen (TYLENOL) 325 MG tablet Take 2 tablets (650 mg) by mouth every 6 hours as needed for pain     acetaminophen-codeine (TYLENOL W/CODEINE #3) 300-30 MG per tablet Take 1 tablet by mouth every 6 hours as needed for severe pain     cholecalciferol, vitamin D3, 50 mcg  (2,000 unit) Tab [CHOLECALCIFEROL, VITAMIN D3, 50 MCG (2,000 UNIT) TAB] Take 1 tablet (2,000 Units total) by mouth daily. One tab daily     desonide (DESOWEN) 0.05 % cream [DESONIDE (DESOWEN) 0.05 % CREAM] Apply to affected area 2 times daily (Patient taking differently: Apply topically 2 times daily as needed)     ferrous sulfate 325 (65 FE) MG tablet [FERROUS SULFATE 325 (65 FE) MG TABLET] Take 1 tablet (325 mg total) by mouth daily with breakfast.     gabapentin (NEURONTIN) 100 MG capsule Take 1 capsule (100 mg) by mouth 3 times daily as needed for neuropathic pain or other (anxiety or pain)     levETIRAcetam (KEPPRA) 750 MG tablet Take 1 tablet (750 mg) by mouth 2 times daily     Lidocaine (LIDOCARE) 4 % Patch Place 1 patch onto the skin every 24 hours To prevent lidocaine toxicity, patient should be patch free for 12 hrs daily.  Back pain     mirtazapine (REMERON) 15 MG tablet Take 1 tablet (15 mg) by mouth At Bedtime     multivitamin (DAILY-DAVID) per tablet [MULTIVITAMIN (DAILY-DAVID) PER TABLET] Take 1 tablet by mouth daily.     nortriptyline (PAMELOR) 25 MG capsule Take 1 capsule (25 mg) by mouth 2 times daily     omeprazole (PRILOSEC) 40 MG DR capsule Take 1 capsule (40 mg) by mouth daily     OXcarbazepine (TRILEPTAL) 150 MG tablet TAKE 3 TABLETS(450 MG) BY MOUTH AT BEDTIME     polyvinyl alcohol (ARTIFICIAL TEARS) 1.4 % ophthalmic solution Place 1 drop into both eyes every hour as needed for dry eyes     QUEtiapine (SEROQUEL) 25 MG tablet Take 1 tablet (25 mg) by mouth 2 times daily     QUEtiapine (SEROQUEL) 25 MG tablet Take 0.5 tablets (12.5 mg) by mouth 2 times daily as needed     QUEtiapine (SEROQUEL) 50 MG tablet Take 1 tablet (50 mg) by mouth At Bedtime     senna-docusate (SENOKOT-S/PERICOLACE) 8.6-50 MG tablet Take 1 tablet by mouth At Bedtime     topiramate (TOPAMAX) 50 MG tablet Take 1 tablet (50 mg) by mouth At Bedtime     No current facility-administered medications for this visit.     ROS:  10  "point ROS of systems including Constitutional, Eyes, Respiratory, Cardiovascular, Gastroenterology, Genitourinary, Integumentary, Musculoskeletal, Psychiatric were all negative except for pertinent positives noted in my HPI.    Vitals:  /64   Pulse 86   Temp 98.4  F (36.9  C)   Resp 18   Ht 1.651 m (5' 5\")   SpO2 95%   BMI 22.93 kg/m    Exam:  GENERAL APPEARANCE:  Alert, in no distress, pleasant, cooperative, oriented x self, place and recent events  EYES:  EOM, lids, pupils and irises normal, sclera clear and conjunctiva normal, no discharge or mattering on lids or lashes noted  ENT:  Mouth normal, moist mucous membranes, nose normal without drainage or crusting, external ears without lesions, hearing acuity intact  NECK: supple, symmetrical, trachea midline  RESP:  respiratory effort normal, no chest wall tenderness, no respiratory distress, Lung sounds clear, patient is on room air  CV:  Auscultation of heart done, rate and rhythm controlled and regular, no murmur, no rub or gallop. Edema none bilateral lower extremities  ABDOMEN:  normal bowel sounds, soft, nontender, no palpable masses.  M/S:   Gait and station not assessed, no tenderness or swelling of the joints; able to move all extremities, digits normal  NEURO: cranial nerves 2-12 grossly intact, no facial asymmetry, no speech deficits and able to follow directions, moves all extremities symmetrically  PSYCH:  insight and judgement and memory appear impaired, affect and mood normal     Lab/Diagnostic data:  Most Recent 3 CBC's:  Recent Labs   Lab Test 09/23/22  0837 09/16/22  0846 09/12/22  0540   WBC 7.2 9.0 9.8   HGB 10.5* 10.7* 10.0*   MCV 82 82 82   * 546* 437     Most Recent 3 BMP's:  Recent Labs   Lab Test 10/03/22  1006 10/01/22  1159 10/01/22  0757 09/30/22  0836 09/29/22  0832 09/28/22  0855 09/26/22  0800 09/24/22  1612 09/24/22  0745 09/22/22  1449 09/22/22  1255     --   --   --   --   --  136  --  137  --  132* "   POTASSIUM 3.9  --   --  4.4 3.7   < > 4.2  --   --   --  4.1  4.1   CHLORIDE 100  --   --   --   --   --  104  --   --   --  101   CO2 25  --   --   --   --   --  24  --   --   --  25   BUN 18  --   --   --   --   --  18  --   --   --  10   CR 0.48*  --   --   --   --   --  0.52  --   --   --  0.43*   ANIONGAP 10  --   --   --   --   --  8  --   --   --  6   ADY 8.8  --   --   --   --   --  8.9  --   --   --  9.1   * 111* 114*  --   --   --  144*   < >  --    < > 105*    < > = values in this interval not displayed.       ASSESSMENT/PLAN:  Pyogenic brain abscess  Sepsis, due to unspecified organism, unspecified whether acute organ dysfunction present (H)  Infection due to 2019 novel coronavirus  Bacterial pneumonia  Acute, improved. No off antibiotics. Monitor fever curve, neurological and respiratory status. CBC on 10/10 and f/u results. Neurology f/u as recommended.     Trigeminal neuralgia  Depression with anxiety  Hx suicidal ideation  Chronic issues, appears stable with Neurontin 100 mg TID PRN pain or anxiety, Keppra 750 mg BID, Remeron 15 mg HS, Pamelor 25 mg BID, trileptal 450 mg HS, Seroquel 25 mg BID and 50 mg HS as well as 12.5 mg BID PRN, Topamax 50 mg HS. Monitor symptoms and for safety. ACP consult if necessary.     C. difficile colitis  Resolved, monitor for recurrence of diarrhea.     Anemia, unspecified type  Acute with illness, continue ferrous sulfate 325 mg daily, MVI daily, check CBC on 10/10.    Chronic back pain, unspecified back location, unspecified back pain laterality  Physical deconditioning  Acute on chronic. Continue tylenol 650 mg QID PRN, tylenol #3 300-30 mg 1 tab every 6 hrs PRN, vit d 2000 units daily, lidocaine patch TD daily, Staff to update provider if not effective. Therapies as ordered and f/u progress next visit.     Gastroesophageal reflux disease with esophagitis without hemorrhage  Chronic, managed with PTA Prilosec 40 mg daily, monitor.     Electrolyte  imbalance  Resolved. Check BMP on 10/10 and f/u results.     Advanced directives, counseling/discussion  Reviewed code status - Full Code confirmed    Orders:  1. Full Code  2. CBC, BMP 10/10 diagnosis weakness    Total unit/floor time of 38 minutes spent consisted of the following: examination of patient, reviewing the record including pertinent labs and imaging. More than 50% of this time was spent in coordination of care with the patient, nursing staff, and other healthcare providers. This time was spent on discussing the care plan including labs, discharge/safe placement, and specialty follow up. Additional specific discussion included review of code status, pain management, labs for monitoring, expected TCU course/routine/discharge planning and clarification of meds/orders with nursing for a total of over 20 min.     Electronically signed by:  DANIELLE Sanchez CNP                Admission

## 2022-10-15 NOTE — PATIENT PROFILE ADULT - FUNCTIONAL SCREEN CURRENT LEVEL: COMMUNICATION, MLM
Discharge patient home as order. Teaching complete, patient feel comfortable in taking care of herself and  infant. Hugs and kisses off, send both mom and infant to their family car @ [phone]. 0 = understands/communicates without difficulty

## 2022-10-25 ENCOUNTER — APPOINTMENT (OUTPATIENT)
Dept: HEPATOLOGY | Facility: CLINIC | Age: 57
End: 2022-10-25

## 2022-10-25 VITALS
DIASTOLIC BLOOD PRESSURE: 57 MMHG | TEMPERATURE: 97.9 F | SYSTOLIC BLOOD PRESSURE: 94 MMHG | BODY MASS INDEX: 28.62 KG/M2 | RESPIRATION RATE: 14 BRPM | WEIGHT: 223 LBS | OXYGEN SATURATION: 100 % | HEART RATE: 69 BPM | HEIGHT: 74 IN

## 2022-10-25 PROCEDURE — 99214 OFFICE O/P EST MOD 30 MIN: CPT

## 2022-10-25 RX ORDER — CEFUROXIME AXETIL 500 MG/1
500 TABLET ORAL
Qty: 20 | Refills: 0 | Status: COMPLETED | COMMUNITY
Start: 2022-05-12

## 2022-10-25 RX ORDER — COVID-19 ANTIGEN TEST
KIT MISCELLANEOUS
Qty: 8 | Refills: 0 | Status: COMPLETED | COMMUNITY
Start: 2022-05-08

## 2022-10-25 RX ORDER — OXYCODONE 5 MG/1
5 TABLET ORAL
Qty: 10 | Refills: 0 | Status: COMPLETED | COMMUNITY
Start: 2022-10-20

## 2022-10-25 RX ORDER — METOLAZONE 2.5 MG/1
2.5 TABLET ORAL
Qty: 60 | Refills: 0 | Status: COMPLETED | COMMUNITY
Start: 2022-05-23

## 2022-10-26 LAB
AFP-TM SERPL-MCNC: 6.3 NG/ML
ALBUMIN SERPL ELPH-MCNC: 3.5 G/DL
ALP BLD-CCNC: 79 U/L
ALT SERPL-CCNC: 39 U/L
ANION GAP SERPL CALC-SCNC: 13 MMOL/L
AST SERPL-CCNC: 58 U/L
BASOPHILS # BLD AUTO: 0.04 K/UL
BASOPHILS NFR BLD AUTO: 0.8 %
BILIRUB SERPL-MCNC: 1.7 MG/DL
BUN SERPL-MCNC: 14 MG/DL
CALCIUM SERPL-MCNC: 8.8 MG/DL
CHLORIDE SERPL-SCNC: 101 MMOL/L
CO2 SERPL-SCNC: 24 MMOL/L
CREAT SERPL-MCNC: 0.91 MG/DL
EGFR: 97 ML/MIN/1.73M2
EOSINOPHIL # BLD AUTO: 0.14 K/UL
EOSINOPHIL NFR BLD AUTO: 2.7 %
GLUCOSE SERPL-MCNC: 92 MG/DL
HCT VFR BLD CALC: 39 %
HGB BLD-MCNC: 13 G/DL
IMM GRANULOCYTES NFR BLD AUTO: 0.4 %
INR PPP: 1.54 RATIO
LYMPHOCYTES # BLD AUTO: 1.07 K/UL
LYMPHOCYTES NFR BLD AUTO: 20.5 %
MAN DIFF?: NORMAL
MCHC RBC-ENTMCNC: 32.1 PG
MCHC RBC-ENTMCNC: 33.3 GM/DL
MCV RBC AUTO: 96.3 FL
MONOCYTES # BLD AUTO: 0.76 K/UL
MONOCYTES NFR BLD AUTO: 14.6 %
NEUTROPHILS # BLD AUTO: 3.19 K/UL
NEUTROPHILS NFR BLD AUTO: 61 %
PLATELET # BLD AUTO: 56 K/UL
POTASSIUM SERPL-SCNC: 5.2 MMOL/L
PROT SERPL-MCNC: 6.1 G/DL
PT BLD: 18 SEC
RBC # BLD: 4.05 M/UL
RBC # FLD: 15.1 %
SODIUM SERPL-SCNC: 137 MMOL/L
WBC # FLD AUTO: 5.22 K/UL

## 2022-10-30 NOTE — HISTORY OF PRESENT ILLNESS
[FreeTextEntry1] : Mr. MARIMAR SMART a 56 year White male  presents today for  a hepatology appointment. He has been a patient  [facility] and has been referred to us by Alverto Maya, gastroenterologist.\par \par History based on clinic visit on 17 February 2022:\par \par Patient has known diagnosis of alcohol associated cirrhosis of the liver with decompensation in the form of ascites and bilateral lower extremity edema.  Patient lives long history of alcohol use disorder, currently has drank more than 30 years.  He quit his drinking 31 October 2021 after becoming sick with progressive abdominal distention and lower extremity swelling.  He was admitted at [facility] with the same complaint.  He was also diagnosed with bacteremia at that time that was treated with IV antibiotics.  He reports having 2 large volume paracentesis the last one was December 30, 2021 with removal of about 30 L of ascitic fluid.  Patient has been on Lasix 40 mg daily and Aldactone 100 mg daily presently with complete resolution of ascites although still has some bilateral lower extremity edema which has been better over time.  He was also diagnosed with portal vein thrombosis during his hospitalization in October 2021.  Since then he has been on Eliquis.\par \par He had a CAT scan of the abdomen done during his hospitalization in October 2021 that revealed- findings suggesting cirrhosis with portal hypertension and large amount of ascites. Portal vein thrombosis is again noted. Moderate left and mild right pleural effusions which have not significantly changed. Atelectatic lung. No focal consolidation seen.\par \par Ascites has resolved but still has edema in both lower extremities along with chronic skin changes.  He denies any other medical problems.\par \par His family history significant for breast cancer in her mother and her father passed away at the age of 95.  No family history of liver disease.\par \par Social history: Patient reportedly drinking alcohol since very early age and has been drinking for more than 30 years mostly beers about 4-6 beers a day.  Used to drink most of the days.  He denies any DUIs.  He reportedly using marijuana on and off and the last use was in November 2021. 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  He has 2 daughters aged 25 years and 24 years and 1 son has a 20 years.\par \par Recent blood test results from January 5, 2022 was reviewed.  This revealed white cell count of 5.09, hemoglobin 13.6, per deciliter, platelet count 75,000.  CMP revealed a serum sodium 131, potassium 5.0.  Serum creatinine was 0.84 mg/dL.  Liver test revealed total bilirubin 2.2 mg/dL, AST 69, ALT 40, alkaline phosphatase 131.  INR at that time was 1.81.  His calculated meld sodium score at that time was 20.\par \par He had an upper endoscopy on December 30, 2021 that revealed grade 1 esophageal varices.  Mild gastritis was noted.  No biopsies were performed.\par \par Interval history dated March 17, 2022:\hakan \hakan Patient presents for hepatology follow-up appointment.  He was last seen in hepatology clinic on 17 February 2022.  He reports he has been doing well since his last clinic visit.  No recent hospitalization following his last clinic visit.  He remains on Lasix 40 mg daily and Aldactone 100 mg daily with excellent control of his ascites and minimal lower extremity edema.  He remains on Eliquis 5 mg daily for his portal vein thrombosis.  He is awaiting ultrasound of the abdomen for follow-up evaluation.\par \par Patient has not yet joined an alcohol last prevention program, and mentioned that he has been trying to reach out to [facility] addiction program and has left several messages but has not gotten back an answer from them.\par \par We reviewed laboratory test from last clinic visit on 17 February 2022.  This revealed INR 1.41, sodium 131, potassium 5.0 mmol/L.  Serum creatinine 0.78 mg/dL.  Liver tests are elevated with total bilirubin 2.4 mg/dL, AST 85, ALT 52, alkaline phosphatase 122 units/L.  CBC revealed a white cell count of 5.28, hemoglobin 13.7 g/dL, platelet count 70,000.  Hepatitis B surface antigen was nonreactive, hepatitis B core antibody total nonreactive, hepatitis C antibody nonreactive, hepatitis A IgG antibody reactive, hepatitis B surface antibody nonreactive.\par \par \par Interval Hx dated June 21, 2022\par \hakan Patient was recently hospitalized at [facility], transferred from [facility] for high MELD  Na score. He also had severe hyponatremia at that time. He is currently on Lasix 40 mg and Aldactone 150 mg PO qd. Unable to evaluate for LT at [facility] due to high copay. He is on Eliquis 5 mg PO qd. He also remains on Cipro 500 mg PO qd. \par \par CT showed-Cirrhosis and portal hypertension without evidence for HCC. Moderate to large ascites.Short segment nonvisualization versus narrowing of the main portal vein may be related to chronic thrombosis.\par \par Interval Hx dated October 25, 2022:\par Patient comes for follow-up. Patient states he's feeling well. He recently had strangulated umbilical hernia repair on Oct 20,2022. He states his last paracentesis was in June 1, 2022. He c/o discoloration and swelling in bilateral LE, R>L. Patient also c/o tingling at the base of R foot for the past year.

## 2022-10-30 NOTE — ASSESSMENT
[FreeTextEntry1] : This is a 56-year-old gentleman with known history of alcohol use disorder and alcohol associated cirrhosis of the liver with decompensation in the form of ascites and bilateral lower extremity edema.  Prior history of jaundice which appears to have resolved over time.  He is currently abstinent from alcohol since October 31, 2021.  His ascites better controlled, last para in early June 2022 (7 L).  No prior history of hepatic encephalopathy or gastrointestinal bleeding.  He does have portal hypertension with small esophageal varices.  He also has portal vein thrombosis and remains on Eliquis.\par \par PLAN\par #1.  Cirrhosis of the liver\par I discussed the meaning of cirrhosis with the patient. I reviewed the natural history of the disease. I explained the risks for the development of esophageal varices with and without bleeding, hepatic encephalopathy, ascites, hepatocellular carcinoma, and liver failure. I have explained that disease can progress to the point of requiring an evaluation for liver transplantation. I have explained the need for imaging every 6 months to screen for liver cancer.\par I recommended follow-up labs today that will include CBC, CMP, PT/INR, AFP.\par \par #2.  Ascites\par He will continue with Lasix 40 mg daily and Aldactone 150 mg daily. Mr. SMART was counseled to adhere to a low sodium (<2 grams of sodium per day) diet by: avoiding adding any salt to meals (removing the salt shaker from the table); eliminating salty foods from his diet; eating more home-cooked meals; choosing fresh or frozen, not canned, vegetables and fruits; and reading ingredient and food labels to choose low sodium foods.\par \par #3.  Esophageal varices\par Patient had endoscopy in December 2021 which revealed small grade 1 esophageal varices.  He does not require beta-blocker therapy at this time.  \par \par #4.  Portal vein thrombosis\par Continue with Eliquis 5 mg daily.  Ultrasound of the abdomen from May 32 revealed central portion of the portal vein near the confluence at the site of the questionable thrombus on prior CAT scan was not well visualized.  Patent visualized portion of the portal vein with normal directionality of flow.  Patent hepatic veins and hepatic artery.  I recommended an MRI of the abdomen with and without IV contrast for better evaluation of the hepatic and portal vasculature.\par \par #5.  Colon cancer screening\par Colonoscopy performed in March 2022 and was normal. Repeat in 10 years.\par \par #6.  Alcohol use disorder\par Patient has history of alcohol use disorder but remains abstinent from alcohol since October 2021.  I have recommended patient to join an alcohol relapse prevention program in order to reinforce his sobriety.  Patient appears to be motivated, however he has not yet joined the program.  He is enrolled in the [facility] program (3 months).\par Last EGD was Dec 2021: Grade I esophageal varices. Repeat in 2 years for surveillance.\par \par #6.  Transplant candidacy\par Patient is an excellent candidate for potential evaluation for liver transplantation.  However clinically he appears to be improving with alcohol abstinence.  He has recent hospitalization with hyponatremia, ascites. Unfortunately, we are unable to evaluate for [facility] at [facility] due to high copay. Need to go [place] [facility] [place].  He is currently being evaluated at [facility].\par \par Return to hepatology clinic in about 3 months.

## 2022-10-31 LAB
ALPHA-1-FETOPROTEIN-L3: 6.7 %
ALPHA-1-FETOPROTEIN: 7 NG/ML

## 2022-11-09 ENCOUNTER — OUTPATIENT (OUTPATIENT)
Dept: OUTPATIENT SERVICES | Facility: HOSPITAL | Age: 57
LOS: 1 days | End: 2022-11-09
Payer: COMMERCIAL

## 2022-11-09 ENCOUNTER — APPOINTMENT (OUTPATIENT)
Dept: MRI IMAGING | Facility: CLINIC | Age: 57
End: 2022-11-09

## 2022-11-09 DIAGNOSIS — I81 PORTAL VEIN THROMBOSIS: ICD-10-CM

## 2022-11-09 DIAGNOSIS — Z00.8 ENCOUNTER FOR OTHER GENERAL EXAMINATION: ICD-10-CM

## 2022-11-09 DIAGNOSIS — Z90.89 ACQUIRED ABSENCE OF OTHER ORGANS: Chronic | ICD-10-CM

## 2022-11-09 DIAGNOSIS — Z98.890 OTHER SPECIFIED POSTPROCEDURAL STATES: Chronic | ICD-10-CM

## 2022-11-09 PROCEDURE — 74183 MRI ABD W/O CNTR FLWD CNTR: CPT | Mod: 26

## 2022-11-09 PROCEDURE — A9585: CPT

## 2022-11-09 PROCEDURE — 74183 MRI ABD W/O CNTR FLWD CNTR: CPT

## 2022-11-11 ENCOUNTER — NON-APPOINTMENT (OUTPATIENT)
Age: 57
End: 2022-11-11

## 2022-11-12 ENCOUNTER — APPOINTMENT (OUTPATIENT)
Dept: RADIOLOGY | Facility: CLINIC | Age: 57
End: 2022-11-12

## 2022-11-12 ENCOUNTER — OUTPATIENT (OUTPATIENT)
Dept: OUTPATIENT SERVICES | Facility: HOSPITAL | Age: 57
LOS: 1 days | End: 2022-11-12
Payer: COMMERCIAL

## 2022-11-12 DIAGNOSIS — M25.512 PAIN IN LEFT SHOULDER: ICD-10-CM

## 2022-11-12 DIAGNOSIS — Z90.89 ACQUIRED ABSENCE OF OTHER ORGANS: Chronic | ICD-10-CM

## 2022-11-12 DIAGNOSIS — Z98.890 OTHER SPECIFIED POSTPROCEDURAL STATES: Chronic | ICD-10-CM

## 2022-11-12 PROCEDURE — 73030 X-RAY EXAM OF SHOULDER: CPT | Mod: 26,LT

## 2022-11-12 PROCEDURE — 73030 X-RAY EXAM OF SHOULDER: CPT

## 2022-11-17 ENCOUNTER — APPOINTMENT (OUTPATIENT)
Dept: ORTHOPEDIC SURGERY | Facility: CLINIC | Age: 57
End: 2022-11-17

## 2022-11-17 ENCOUNTER — NON-APPOINTMENT (OUTPATIENT)
Age: 57
End: 2022-11-17

## 2022-11-17 VITALS
HEIGHT: 74 IN | WEIGHT: 223 LBS | DIASTOLIC BLOOD PRESSURE: 75 MMHG | BODY MASS INDEX: 28.62 KG/M2 | HEART RATE: 73 BPM | SYSTOLIC BLOOD PRESSURE: 115 MMHG

## 2022-11-17 PROCEDURE — 99204 OFFICE O/P NEW MOD 45 MIN: CPT

## 2022-11-17 NOTE — PHYSICAL EXAM
[de-identified] : General Appearance: normal without acute distress\par Mental: Alert and oriented x 3\par Psych/affect: appropriate, cooperative\par Gait: [normal] gait\par \par Left shoulder\par Skin intact\par Significant ecchymosis around his anterior chest and shoulder\par Tender palpation around distal clavicle only\par Neurovascular intact distally [de-identified] : Shoulder x-rays from urgent care reviewed and interpreted in the office today–nondisplaced distal clavicle fracture

## 2022-11-17 NOTE — DISCUSSION/SUMMARY
[de-identified] : Left distal clavicle fracture minimally displaced\par \par Extensive discussion of the natural history of this disease was had with the patient.  We discussed the treatment options focusing on conservative therapy.  The patient is ready and wants 2 weeks from the injury I do not believe sling would be necessary at this time.  Recommend nonweightbearing of this extremity for 6 weeks.  Encouraged to do range of motion exercises.  Patient will follow-up in 6 weeks for repeat x-ray.

## 2022-11-17 NOTE — HISTORY OF PRESENT ILLNESS
[de-identified] : Patient here for left shoulder pain.  Patient states he fell off a retaining wall which was about 5 feet high on 11/5.  He was seen in an urgent care and told he had a distal clavicle fracture.  Has not been doing anything for this.  Denies numbness or tingling.  Is on Eliquis.

## 2022-11-28 ENCOUNTER — TRANSCRIPTION ENCOUNTER (OUTPATIENT)
Age: 57
End: 2022-11-28

## 2022-12-19 ENCOUNTER — NON-APPOINTMENT (OUTPATIENT)
Age: 57
End: 2022-12-19

## 2022-12-19 ENCOUNTER — TRANSCRIPTION ENCOUNTER (OUTPATIENT)
Age: 57
End: 2022-12-19

## 2022-12-19 RX ORDER — APIXABAN 5 MG/1
5 TABLET, FILM COATED ORAL
Refills: 0 | Status: DISCONTINUED | COMMUNITY
End: 2022-12-19

## 2022-12-19 RX ORDER — CIPROFLOXACIN HYDROCHLORIDE 500 MG/1
500 TABLET, FILM COATED ORAL
Qty: 30 | Refills: 3 | Status: DISCONTINUED | COMMUNITY
Start: 2022-06-05 | End: 2022-12-19

## 2022-12-23 ENCOUNTER — TRANSCRIPTION ENCOUNTER (OUTPATIENT)
Age: 57
End: 2022-12-23

## 2022-12-27 ENCOUNTER — TRANSCRIPTION ENCOUNTER (OUTPATIENT)
Age: 57
End: 2022-12-27

## 2022-12-29 NOTE — DISCHARGE NOTE PROVIDER - NSDCQMSTROKE_NEU_ALL_CORE
No PROGRESS NOTE:   Authored by Rahel Dotson MD     Patient is a 65y old  Male who presents with a chief complaint of C/F Foot osteomyelitis (28 Dec 2022 17:58)      SUBJECTIVE / OVERNIGHT EVENTS:    ADDITIONAL REVIEW OF SYSTEMS:    MEDICATIONS  (STANDING):  atorvastatin 20 milliGRAM(s) Oral at bedtime  chlorhexidine 2% Cloths 1 Application(s) Topical daily  dextrose 5%. 1000 milliLiter(s) (100 mL/Hr) IV Continuous <Continuous>  dextrose 5%. 1000 milliLiter(s) (50 mL/Hr) IV Continuous <Continuous>  dextrose 50% Injectable 25 Gram(s) IV Push once  dextrose 50% Injectable 12.5 Gram(s) IV Push once  dextrose 50% Injectable 25 Gram(s) IV Push once  folic acid 1 milliGRAM(s) Oral daily  glucagon  Injectable 1 milliGRAM(s) IntraMuscular once  heparin   Injectable 5000 Unit(s) SubCutaneous every 8 hours  insulin glargine Injectable (LANTUS) 10 Unit(s) SubCutaneous at bedtime  insulin lispro (ADMELOG) corrective regimen sliding scale   SubCutaneous three times a day before meals  mycophenolate mofetil 250 milliGRAM(s) Oral two times a day  pantoprazole    Tablet 40 milliGRAM(s) Oral before breakfast  piperacillin/tazobactam IVPB.. 3.375 Gram(s) IV Intermittent every 8 hours  predniSONE   Tablet 20 milliGRAM(s) Oral daily  senna 2 Tablet(s) Oral at bedtime  sertraline 100 milliGRAM(s) Oral daily  tamsulosin 0.4 milliGRAM(s) Oral at bedtime    MEDICATIONS  (PRN):  dextrose Oral Gel 15 Gram(s) Oral once PRN Blood Glucose LESS THAN 70 milliGRAM(s)/deciliter  polyethylene glycol 3350 17 Gram(s) Oral daily PRN Constipation      CAPILLARY BLOOD GLUCOSE      POCT Blood Glucose.: 359 mg/dL (28 Dec 2022 21:31)  POCT Blood Glucose.: 339 mg/dL (28 Dec 2022 17:01)  POCT Blood Glucose.: 302 mg/dL (28 Dec 2022 12:20)  POCT Blood Glucose.: 157 mg/dL (28 Dec 2022 07:50)    I&O's Summary    28 Dec 2022 07:01  -  29 Dec 2022 07:00  --------------------------------------------------------  IN: 1440 mL / OUT: 2250 mL / NET: -810 mL        PHYSICAL EXAM:  Vital Signs Last 24 Hrs  T(C): 36.5 (28 Dec 2022 23:45), Max: 36.7 (28 Dec 2022 10:55)  T(F): 97.7 (28 Dec 2022 23:45), Max: 98.1 (28 Dec 2022 10:55)  HR: 78 (28 Dec 2022 23:45) (76 - 78)  BP: 140/70 (28 Dec 2022 23:45) (132/70 - 140/70)  BP(mean): --  RR: 18 (28 Dec 2022 23:45) (18 - 18)  SpO2: 95% (28 Dec 2022 23:45) (95% - 96%)    Parameters below as of 28 Dec 2022 23:45  Patient On (Oxygen Delivery Method): room air        GENERAL: No acute distress, well-developed  HEAD:  Atraumatic, Normocephalic  EYES: EOMI, PERRLA, conjunctiva and sclera clear  NECK: Supple, no lymphadenopathy, no JVD  CHEST/LUNG: CTAB; No wheezes, rales, or rhonchi  HEART: Regular rate and rhythm; No murmurs, rubs, or gallops  ABDOMEN: Soft, non-tender, non-distended; normal bowel sounds, no organomegaly  EXTREMITIES:  2+ peripheral pulses b/l, No clubbing, cyanosis, or edema  NEUROLOGY: A&O x 3, no focal deficits  SKIN: No rashes or lesions    LABS:                        10.6   5.79  )-----------( 200      ( 29 Dec 2022 06:21 )             32.9     12-29    137  |  106  |  34<H>  ----------------------------<  245<H>  4.4   |  21<L>  |  1.86<H>    Ca    9.0      29 Dec 2022 06:21  Phos  3.3     12-28  Mg     1.9     12-28    TPro  x   /  Alb  x   /  TBili  0.2  /  DBili  x   /  AST  x   /  ALT  x   /  AlkPhos  x   12-28    PT/INR - ( 29 Dec 2022 06:21 )   PT: 11.9 sec;   INR: 1.03 ratio                   Culture - Abscess with Gram Stain (collected 26 Dec 2022 22:03)  Source: .Abscess right foot  Preliminary Report (28 Dec 2022 22:52):    Rare Coag Negative Staphylococcus    Few Corynebacterium species    "Susceptibilities not performed"    Culture - Blood (collected 26 Dec 2022 19:55)  Source: .Blood Blood  Preliminary Report (28 Dec 2022 01:02):    No growth to date.    Culture - Blood (collected 26 Dec 2022 19:45)  Source: .Blood Blood  Preliminary Report (28 Dec 2022 01:02):    No growth to date.        RADIOLOGY & ADDITIONAL TESTS:  Results Reviewed:   Imaging Personally Reviewed:  Electrocardiogram Personally Reviewed:    COORDINATION OF CARE:  Care Discussed with Consultants/Other Providers [Y/N]:  Prior or Outpatient Records Reviewed [Y/N]:   PROGRESS NOTE:   Authored by Rahel Dotson MD     Patient is a 65y old  Male who presents with a chief complaint of C/F Foot osteomyelitis (28 Dec 2022 17:58)      SUBJECTIVE / OVERNIGHT EVENTS:  Patient stated that he didn't have any symptoms: nausea, vomiting, foot pain, SOB.     ADDITIONAL REVIEW OF SYSTEMS:    MEDICATIONS  (STANDING):  atorvastatin 20 milliGRAM(s) Oral at bedtime  chlorhexidine 2% Cloths 1 Application(s) Topical daily  dextrose 5%. 1000 milliLiter(s) (100 mL/Hr) IV Continuous <Continuous>  dextrose 5%. 1000 milliLiter(s) (50 mL/Hr) IV Continuous <Continuous>  dextrose 50% Injectable 25 Gram(s) IV Push once  dextrose 50% Injectable 12.5 Gram(s) IV Push once  dextrose 50% Injectable 25 Gram(s) IV Push once  folic acid 1 milliGRAM(s) Oral daily  glucagon  Injectable 1 milliGRAM(s) IntraMuscular once  heparin   Injectable 5000 Unit(s) SubCutaneous every 8 hours  insulin glargine Injectable (LANTUS) 10 Unit(s) SubCutaneous at bedtime  insulin lispro (ADMELOG) corrective regimen sliding scale   SubCutaneous three times a day before meals  mycophenolate mofetil 250 milliGRAM(s) Oral two times a day  pantoprazole    Tablet 40 milliGRAM(s) Oral before breakfast  piperacillin/tazobactam IVPB.. 3.375 Gram(s) IV Intermittent every 8 hours  predniSONE   Tablet 20 milliGRAM(s) Oral daily  senna 2 Tablet(s) Oral at bedtime  sertraline 100 milliGRAM(s) Oral daily  tamsulosin 0.4 milliGRAM(s) Oral at bedtime    MEDICATIONS  (PRN):  dextrose Oral Gel 15 Gram(s) Oral once PRN Blood Glucose LESS THAN 70 milliGRAM(s)/deciliter  polyethylene glycol 3350 17 Gram(s) Oral daily PRN Constipation      CAPILLARY BLOOD GLUCOSE      POCT Blood Glucose.: 359 mg/dL (28 Dec 2022 21:31)  POCT Blood Glucose.: 339 mg/dL (28 Dec 2022 17:01)  POCT Blood Glucose.: 302 mg/dL (28 Dec 2022 12:20)  POCT Blood Glucose.: 157 mg/dL (28 Dec 2022 07:50)    I&O's Summary    28 Dec 2022 07:01  -  29 Dec 2022 07:00  --------------------------------------------------------  IN: 1440 mL / OUT: 2250 mL / NET: -810 mL        PHYSICAL EXAM:  Vital Signs Last 24 Hrs  T(C): 36.5 (28 Dec 2022 23:45), Max: 36.7 (28 Dec 2022 10:55)  T(F): 97.7 (28 Dec 2022 23:45), Max: 98.1 (28 Dec 2022 10:55)  HR: 78 (28 Dec 2022 23:45) (76 - 78)  BP: 140/70 (28 Dec 2022 23:45) (132/70 - 140/70)  BP(mean): --  RR: 18 (28 Dec 2022 23:45) (18 - 18)  SpO2: 95% (28 Dec 2022 23:45) (95% - 96%)    Parameters below as of 28 Dec 2022 23:45  Patient On (Oxygen Delivery Method): room air        GENERAL: No acute distress, well-developed  HEAD:  Atraumatic, Normocephalic  EYES: EOMI, PERRLA, conjunctiva and sclera clear  NECK: Supple, no lymphadenopathy, no JVD  CHEST/LUNG: CTAB; No wheezes, rales, or rhonchi  HEART: Regular rate and rhythm; No murmurs, rubs, or gallops  ABDOMEN: Soft, non-tender, non-distended; normal bowel sounds, no organomegaly  EXTREMITIES:  2+ peripheral pulses b/l, No clubbing, cyanosis, or edema  NEUROLOGY: A&O x 3, no focal deficits  SKIN: R foot was wrapped, no discharge or bleeding     LABS:                        10.6   5.79  )-----------( 200      ( 29 Dec 2022 06:21 )             32.9     12-29    137  |  106  |  34<H>  ----------------------------<  245<H>  4.4   |  21<L>  |  1.86<H>    Ca    9.0      29 Dec 2022 06:21  Phos  3.3     12-28  Mg     1.9     12-28    TPro  x   /  Alb  x   /  TBili  0.2  /  DBili  x   /  AST  x   /  ALT  x   /  AlkPhos  x   12-28    PT/INR - ( 29 Dec 2022 06:21 )   PT: 11.9 sec;   INR: 1.03 ratio                   Culture - Abscess with Gram Stain (collected 26 Dec 2022 22:03)  Source: .Abscess right foot  Preliminary Report (28 Dec 2022 22:52):    Rare Coag Negative Staphylococcus    Few Corynebacterium species    "Susceptibilities not performed"    Culture - Blood (collected 26 Dec 2022 19:55)  Source: .Blood Blood  Preliminary Report (28 Dec 2022 01:02):    No growth to date.    Culture - Blood (collected 26 Dec 2022 19:45)  Source: .Blood Blood  Preliminary Report (28 Dec 2022 01:02):    No growth to date.        RADIOLOGY & ADDITIONAL TESTS:  Results Reviewed:   Imaging Personally Reviewed:  Electrocardiogram Personally Reviewed:    COORDINATION OF CARE:  Care Discussed with Consultants/Other Providers [Y/N]:  Prior or Outpatient Records Reviewed [Y/N]:

## 2022-12-30 ENCOUNTER — APPOINTMENT (OUTPATIENT)
Dept: ORTHOPEDIC SURGERY | Facility: CLINIC | Age: 57
End: 2022-12-30
Payer: COMMERCIAL

## 2022-12-30 DIAGNOSIS — S42.033A DISPLACED FRACTURE OF LATERAL END OF UNSPECIFIED CLAVICLE, INITIAL ENCOUNTER FOR CLOSED FRACTURE: ICD-10-CM

## 2022-12-30 PROCEDURE — 99213 OFFICE O/P EST LOW 20 MIN: CPT

## 2022-12-30 PROCEDURE — 73000 X-RAY EXAM OF COLLAR BONE: CPT | Mod: LT

## 2022-12-30 NOTE — HISTORY OF PRESENT ILLNESS
[de-identified] : Patient here for follow-up for left shoulder pain.  His pain is significantly improved.  Has been lately using his shoulder.  Still some achiness and cannot sleep on that side.,  States he hurt it about 10 years ago.  Was told he had a tear.  States he is just interested in what the options are.  Did do some physical therapy but felt it was a joke and quit.\par \par 11/17/2022: Patient here for left shoulder pain.  Patient states he fell off a retaining wall which was about 5 feet high on 11/5.  He was seen in an urgent care and told he had a distal clavicle fracture.  Has not been doing anything for this.  Denies numbness or tingling.  Is on Eliquis.

## 2022-12-30 NOTE — PHYSICAL EXAM
[de-identified] : Left shoulder\par Skin intact\par Nontender palpation\par Full range of motion of shoulder\par Neurovascular intact distally\par \par Right shoulder\par ROM:\par FF: 180 degrees\par ER: 90 degrees\par IR behind back: T7\par \par RTC:\par Empty Can Sign: Positive\par Belly Press/Lift Off Test: Negative\par Impingement:\par Forte/Neer Sign: Negative\par ACJ:\par Cross Arm Adduction Test: Negative\par Hanford's Test - Superficial: Positive\par Labrum:\par Hanford's Test - Deep: Positive\par Biceps:\par Speed's Test: Negative\par Grossly motor and sensory intact\par  [de-identified] : Shoulder x-rays taken today in the office reviewed and interpreted in the office today–nondisplaced distal clavicle fracture, some bony healing

## 2022-12-30 NOTE — DISCUSSION/SUMMARY
[de-identified] : Left distal clavicle fracture minimally displaced, healing.  Likely right shoulder chronic rotator cuff tear\par \par Extensive discussion of the natural history of this disease was had with the patient.  Patient is doing well, fracture is healing.  He may begin light use of the extremity.  Recommend avoiding anything strenuous or that anything that causes pain.  If he has any concerns he may follow-up for this.  We also discussed his right shoulder.  I discussed conservative treatment including exercise or physical therapy.  Patient declined formal physical therapy at this time.  We also discussed what rotator cuff surgery entails.  If this continues to be bothersome to him he considering surgery I suggested he follow with my sports medicine partner Dr. Da Silva for evaluation.

## 2023-01-20 NOTE — H&P ADULT - NSCORESITESY/N_GEN_A_CORE_RD
Spoke to patient regarding his elevated WBC count  Patient is not experiencing any fevers or chills  Home health was present at the time of phone call doing a dressing change  Per home health care nurse, there is some thick green slough from the incision but it looks like it is improving from a couple days ago  Plan to repeat CBC on 1/31 prior to post op visit with Dr Brody Haddad  Blood work script faxed to [facility] ([facility]) so they can draw it at the visit 
No

## 2023-02-01 ENCOUNTER — APPOINTMENT (OUTPATIENT)
Dept: HEPATOLOGY | Facility: CLINIC | Age: 58
End: 2023-02-01
Payer: COMMERCIAL

## 2023-02-01 VITALS
WEIGHT: 234 LBS | HEIGHT: 74 IN | SYSTOLIC BLOOD PRESSURE: 122 MMHG | HEART RATE: 78 BPM | BODY MASS INDEX: 30.03 KG/M2 | DIASTOLIC BLOOD PRESSURE: 72 MMHG | RESPIRATION RATE: 14 BRPM | TEMPERATURE: 97.9 F

## 2023-02-01 LAB
ALBUMIN SERPL ELPH-MCNC: 3.5 G/DL
ALP BLD-CCNC: 100 U/L
ALT SERPL-CCNC: 25 U/L
ANION GAP SERPL CALC-SCNC: 10 MMOL/L
AST SERPL-CCNC: 45 U/L
BILIRUB SERPL-MCNC: 1.2 MG/DL
BUN SERPL-MCNC: 14 MG/DL
CALCIUM SERPL-MCNC: 9 MG/DL
CHLORIDE SERPL-SCNC: 103 MMOL/L
CO2 SERPL-SCNC: 24 MMOL/L
CREAT SERPL-MCNC: 0.95 MG/DL
EGFR: 92 ML/MIN/1.73M2
GLUCOSE SERPL-MCNC: 111 MG/DL
INR PPP: 1.25 RATIO
POTASSIUM SERPL-SCNC: 4.3 MMOL/L
PROT SERPL-MCNC: 5.8 G/DL
PT BLD: 14.8 SEC
SODIUM SERPL-SCNC: 136 MMOL/L

## 2023-02-01 PROCEDURE — 99214 OFFICE O/P EST MOD 30 MIN: CPT

## 2023-02-01 NOTE — PHYSICAL EXAM
[General Appearance - Alert] : alert [General Appearance - In No Acute Distress] : in no acute distress [Auscultation Breath Sounds / Voice Sounds] : lungs were clear to auscultation bilaterally [Heart Rate And Rhythm] : heart rate was normal and rhythm regular [Heart Sounds] : normal S1 and S2 [Heart Sounds Gallop] : no gallops [Murmurs] : no murmurs [Heart Sounds Pericardial Friction Rub] : no pericardial rub [Full Pulse] : the pedal pulses are present [Edema] : there was no peripheral edema [Bowel Sounds] : normal bowel sounds [Abdomen Soft] : soft [Abdomen Tenderness] : non-tender [Abdomen Mass (___ Cm)] : no abdominal mass palpated [Abnormal Walk] : normal gait [Nail Clubbing] : no clubbing  or cyanosis of the fingernails [Musculoskeletal - Swelling] : no joint swelling seen [Motor Tone] : muscle strength and tone were normal [Skin Color & Pigmentation] : normal skin color and pigmentation [Skin Turgor] : normal skin turgor [] : no rash [Deep Tendon Reflexes (DTR)] : deep tendon reflexes were 2+ and symmetric [Sensation] : the sensory exam was normal to light touch and pinprick [No Focal Deficits] : no focal deficits [Oriented To Time, Place, And Person] : oriented to person, place, and time [Impaired Insight] : insight and judgment were intact [Affect] : the affect was normal

## 2023-02-02 LAB
BASOPHILS # BLD AUTO: 0.04 K/UL
BASOPHILS NFR BLD AUTO: 1 %
EOSINOPHIL # BLD AUTO: 0.15 K/UL
EOSINOPHIL NFR BLD AUTO: 3.9 %
HCT VFR BLD CALC: 40 %
HGB BLD-MCNC: 13.2 G/DL
IMM GRANULOCYTES NFR BLD AUTO: 0.3 %
LYMPHOCYTES # BLD AUTO: 1.01 K/UL
LYMPHOCYTES NFR BLD AUTO: 26.2 %
MAN DIFF?: NORMAL
MCHC RBC-ENTMCNC: 33 GM/DL
MCHC RBC-ENTMCNC: 33.4 PG
MCV RBC AUTO: 101.3 FL
MONOCYTES # BLD AUTO: 0.54 K/UL
MONOCYTES NFR BLD AUTO: 14 %
NEUTROPHILS # BLD AUTO: 2.1 K/UL
NEUTROPHILS NFR BLD AUTO: 54.6 %
PLATELET # BLD AUTO: 51 K/UL
RBC # BLD: 3.95 M/UL
RBC # FLD: 14.6 %
WBC # FLD AUTO: 3.85 K/UL

## 2023-02-03 NOTE — HISTORY OF PRESENT ILLNESS
[FreeTextEntry1] : Mr. MARIMAR SMART a 56 year White male  presents today for  a hepatology appointment. He has been a patient  [facility] and has been referred to us by Alverto Maya, gastroenterologist.\par \par History based on clinic visit on 17 February 2022:\par \par Patient has known diagnosis of alcohol associated cirrhosis of the liver with decompensation in the form of ascites and bilateral lower extremity edema.  Patient lives long history of alcohol use disorder, currently has drank more than 30 years.  He quit his drinking 31 October 2021 after becoming sick with progressive abdominal distention and lower extremity swelling.  He was admitted at [facility] with the same complaint.  He was also diagnosed with bacteremia at that time that was treated with IV antibiotics.  He reports having 2 large volume paracentesis the last one was December 30, 2021 with removal of about 30 L of ascitic fluid.  Patient has been on Lasix 40 mg daily and Aldactone 100 mg daily presently with complete resolution of ascites although still has some bilateral lower extremity edema which has been better over time.  He was also diagnosed with portal vein thrombosis during his hospitalization in October 2021.  Since then he has been on Eliquis.\par \par He had a CAT scan of the abdomen done during his hospitalization in October 2021 that revealed- findings suggesting cirrhosis with portal hypertension and large amount of ascites. Portal vein thrombosis is again noted. Moderate left and mild right pleural effusions which have not significantly changed. Atelectatic lung. No focal consolidation seen.\par \par Ascites has resolved but still has edema in both lower extremities along with chronic skin changes.  He denies any other medical problems.\par \par His family history significant for breast cancer in her mother and her father passed away at the age of 95.  No family history of liver disease.\par \par Social history: Patient reportedly drinking alcohol since very early age and has been drinking for more than 30 years mostly beers about 4-6 beers a day.  Used to drink most of the days.  He denies any DUIs.  He reportedly using marijuana on and off and the last use was in November 2021. 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  He has 2 daughters aged 25 years and 24 years and 1 son has a 20 years.\par \par Recent blood test results from January 5, 2022 was reviewed.  This revealed white cell count of 5.09, hemoglobin 13.6, per deciliter, platelet count 75,000.  CMP revealed a serum sodium 131, potassium 5.0.  Serum creatinine was 0.84 mg/dL.  Liver test revealed total bilirubin 2.2 mg/dL, AST 69, ALT 40, alkaline phosphatase 131.  INR at that time was 1.81.  His calculated meld sodium score at that time was 20.\par \par He had an upper endoscopy on December 30, 2021 that revealed grade 1 esophageal varices.  Mild gastritis was noted.  No biopsies were performed.\par \par Interval history dated March 17, 2022:\hakan \hakan Patient presents for hepatology follow-up appointment.  He was last seen in hepatology clinic on 17 February 2022.  He reports he has been doing well since his last clinic visit.  No recent hospitalization following his last clinic visit.  He remains on Lasix 40 mg daily and Aldactone 100 mg daily with excellent control of his ascites and minimal lower extremity edema.  He remains on Eliquis 5 mg daily for his portal vein thrombosis.  He is awaiting ultrasound of the abdomen for follow-up evaluation.\par \par Patient has not yet joined an alcohol last prevention program, and mentioned that he has been trying to reach out to [facility] addiction program and has left several messages but has not gotten back an answer from them.\par \par We reviewed laboratory test from last clinic visit on 17 February 2022.  This revealed INR 1.41, sodium 131, potassium 5.0 mmol/L.  Serum creatinine 0.78 mg/dL.  Liver tests are elevated with total bilirubin 2.4 mg/dL, AST 85, ALT 52, alkaline phosphatase 122 units/L.  CBC revealed a white cell count of 5.28, hemoglobin 13.7 g/dL, platelet count 70,000.  Hepatitis B surface antigen was nonreactive, hepatitis B core antibody total nonreactive, hepatitis C antibody nonreactive, hepatitis A IgG antibody reactive, hepatitis B surface antibody nonreactive.\par \par \par Interval Hx dated June 21, 2022\par \hakan Patient was recently hospitalized at [facility], transferred from [facility] for high MELD  Na score. He also had severe hyponatremia at that time. He is currently on Lasix 40 mg and Aldactone 150 mg PO qd. Unable to evaluate for LT at [facility] due to high copay. He is on Eliquis 5 mg PO qd. He also remains on Cipro 500 mg PO qd. \par \par CT showed-Cirrhosis and portal hypertension without evidence for HCC. Moderate to large ascites.Short segment nonvisualization versus narrowing of the main portal vein may be related to chronic thrombosis.\par \par Interval Hx dated October 25, 2022:\par Patient comes for follow-up. Patient states he's feeling well. He recently had strangulated umbilical hernia repair on Oct 20,2022. He states his last paracentesis was in June 1, 2022. He c/o discoloration and swelling in bilateral LE, R>L. Patient also c/o tingling at the base of R foot for the past year. \par \par Interval History February 1, 2023:\par \par Patient comes for follow-up. Patient states hes feeling well. He c/o weight gain, he is unsure if its fluid or fat. LE edema has worsened and he has increased his furosemide to 80mg daily. He denies abdominal pain, N/V/C/D, pruritus.\par Labs reviewed from Oct 25, 2022: Hemoglobin 13, Plt 56, INR 1.54, Sodium 137, Potassium 5.2, Creatinine 0.91, TBili 1.7, AST 58, ALT 39, Alk Phos 79\par MR Abdomen 11/9/22: Cirrhosis with portal hypertension. No evidence of HCC or portal venous thrombosis. Small volume ascites.

## 2023-02-03 NOTE — ASSESSMENT
[FreeTextEntry1] : This is a 56-year-old gentleman with known history of alcohol use disorder and alcohol associated cirrhosis of the liver with decompensation in the form of ascites and bilateral lower extremity edema.  Prior history of jaundice which appears to have resolved over time.  He is currently abstinent from alcohol since October 31, 2021.  His ascites better controlled, last para in early June 2022 (7 L).  No prior history of hepatic encephalopathy or gastrointestinal bleeding.  He does have portal hypertension with small esophageal varices.  He also has portal vein thrombosis and remains on Eliquis.\par \par PLAN\par #1.  Cirrhosis of the liver\par I discussed the meaning of cirrhosis with the patient. I reviewed the natural history of the disease. I explained the risks for the development of esophageal varices with and without bleeding, hepatic encephalopathy, ascites, hepatocellular carcinoma, and liver failure. I have explained that disease can progress to the point of requiring an evaluation for liver transplantation. I have explained the need for imaging every 6 months to screen for liver cancer.\par I recommended follow-up labs today that will include CBC, CMP, PT/INR, AFP.\par \par #2.  Ascites\par He is currently taking Lasix 80 mg daily ( 40 mg bid) and Aldactone 150 mg daily. We will keep current regimen.  Mr. SMART was counseled to adhere to a low sodium (<2 grams of sodium per day) diet by: avoiding adding any salt to meals (removing the salt shaker from the table); eliminating salty foods from his diet; eating more home-cooked meals; choosing fresh or frozen, not canned, vegetables and fruits; and reading ingredient and food labels to choose low sodium foods.\par \par #3.  Esophageal varices\par Patient had endoscopy in December 2021 which revealed small grade 1 esophageal varices.  He does not require beta-blocker therapy at this time.  \par \par #4.  Portal vein thrombosis\par Ultrasound of the abdomen from May 32 revealed central portion of the portal vein near the confluence at the site of the questionable thrombus on prior CAT scan was not well visualized.  Patent visualized portion of the portal vein with normal directionality of flow.  Patent hepatic veins and hepatic artery.\par MR Abdomen 11/9/22: Cirrhosis with portal hypertension. No evidence of HCC or portal venous thrombosis. Small volume ascites.\par Eliquis was discontinued on 12/19/22\par \par #5.  Colon cancer screening\par Colonoscopy performed in March 2022 and was normal. Repeat in 10 years.\par \par #6.  Alcohol use disorder\par Patient has history of alcohol use disorder but remains abstinent from alcohol since October 2021.  I have recommended patient to join an alcohol relapse prevention program in order to reinforce his sobriety.  Patient appears to be motivated, however he has not yet joined the program.  He is enrolled in the [facility] program (3 months).\par Last EGD was Dec 2021: Grade I esophageal varices. Repeat in 2 years for surveillance.\par \par #6.  Transplant candidacy\par Patient is an excellent candidate for potential evaluation for liver transplantation.  However clinically he appears to be improving with alcohol abstinence.  He has recent hospitalization with hyponatremia, ascites. Unfortunately, we are unable to evaluate for LT at [facility] due to high copay. Need to go to a [facility] facility.  He is currently being evaluated at [facility].\par \par Return to hepatology clinic in about 3 months.

## 2023-02-06 LAB
ALPHA-1-FETOPROTEIN-L3: 5.4 %
ALPHA-1-FETOPROTEIN: 8.2 NG/ML

## 2023-02-16 ENCOUNTER — TRANSCRIPTION ENCOUNTER (OUTPATIENT)
Age: 58
End: 2023-02-16

## 2023-02-26 ENCOUNTER — APPOINTMENT (OUTPATIENT)
Dept: ULTRASOUND IMAGING | Facility: CLINIC | Age: 58
End: 2023-02-26
Payer: COMMERCIAL

## 2023-02-26 ENCOUNTER — OUTPATIENT (OUTPATIENT)
Dept: OUTPATIENT SERVICES | Facility: HOSPITAL | Age: 58
LOS: 1 days | End: 2023-02-26
Payer: COMMERCIAL

## 2023-02-26 DIAGNOSIS — K70.31 ALCOHOLIC CIRRHOSIS OF LIVER WITH ASCITES: ICD-10-CM

## 2023-02-26 DIAGNOSIS — Z90.89 ACQUIRED ABSENCE OF OTHER ORGANS: Chronic | ICD-10-CM

## 2023-02-26 DIAGNOSIS — Z98.890 OTHER SPECIFIED POSTPROCEDURAL STATES: Chronic | ICD-10-CM

## 2023-02-26 PROCEDURE — 93975 VASCULAR STUDY: CPT

## 2023-02-26 PROCEDURE — 93975 VASCULAR STUDY: CPT | Mod: 26

## 2023-04-18 ENCOUNTER — RX RENEWAL (OUTPATIENT)
Age: 58
End: 2023-04-18

## 2023-04-18 ENCOUNTER — TRANSCRIPTION ENCOUNTER (OUTPATIENT)
Age: 58
End: 2023-04-18

## 2023-05-03 ENCOUNTER — APPOINTMENT (OUTPATIENT)
Dept: HEPATOLOGY | Facility: CLINIC | Age: 58
End: 2023-05-03
Payer: COMMERCIAL

## 2023-05-03 VITALS
RESPIRATION RATE: 15 BRPM | BODY MASS INDEX: 32.47 KG/M2 | DIASTOLIC BLOOD PRESSURE: 70 MMHG | WEIGHT: 253 LBS | SYSTOLIC BLOOD PRESSURE: 112 MMHG | OXYGEN SATURATION: 99 % | TEMPERATURE: 98 F | HEIGHT: 74 IN | HEART RATE: 62 BPM

## 2023-05-03 PROCEDURE — 99215 OFFICE O/P EST HI 40 MIN: CPT

## 2023-05-03 RX ORDER — SPIRONOLACTONE 50 MG/1
50 TABLET ORAL
Qty: 90 | Refills: 2 | Status: DISCONTINUED | COMMUNITY
Start: 2022-06-05 | End: 2023-05-03

## 2023-05-10 NOTE — DISCHARGE NOTE NURSING/CASE MANAGEMENT/SOCIAL WORK - NSDCPETBCESMAN_GEN_ALL_CORE
[FreeTextEntry1] : Dear DAYAMI DODSON , \par \par I had the pleasure of seeing your patient, NASIR SHIELDS, in my office today.  Please see my note below.\par \par Thank you very much for allowing me to participate in the care of this patient. If you have any questions, please do not hesitate to contact me.\par \par Sincerely, \par \par Md Kirsten Blanco \par , Pediatric Nephrology\par \par [facility]\par 
If you are a smoker, it is important for your health to stop smoking. Please be aware that second hand smoke is also harmful.

## 2023-06-19 ENCOUNTER — TRANSCRIPTION ENCOUNTER (OUTPATIENT)
Age: 58
End: 2023-06-19

## 2023-06-20 ENCOUNTER — OUTPATIENT (OUTPATIENT)
Dept: OUTPATIENT SERVICES | Facility: HOSPITAL | Age: 58
LOS: 1 days | End: 2023-06-20
Payer: COMMERCIAL

## 2023-06-20 ENCOUNTER — APPOINTMENT (OUTPATIENT)
Dept: MRI IMAGING | Facility: CLINIC | Age: 58
End: 2023-06-20
Payer: COMMERCIAL

## 2023-06-20 DIAGNOSIS — Z00.8 ENCOUNTER FOR OTHER GENERAL EXAMINATION: ICD-10-CM

## 2023-06-20 DIAGNOSIS — Z98.890 OTHER SPECIFIED POSTPROCEDURAL STATES: Chronic | ICD-10-CM

## 2023-06-20 DIAGNOSIS — K70.31 ALCOHOLIC CIRRHOSIS OF LIVER WITH ASCITES: ICD-10-CM

## 2023-06-20 DIAGNOSIS — Z90.89 ACQUIRED ABSENCE OF OTHER ORGANS: Chronic | ICD-10-CM

## 2023-06-20 PROCEDURE — 74183 MRI ABD W/O CNTR FLWD CNTR: CPT

## 2023-06-20 PROCEDURE — A9585: CPT

## 2023-06-20 PROCEDURE — 74183 MRI ABD W/O CNTR FLWD CNTR: CPT | Mod: 26

## 2023-07-06 ENCOUNTER — NON-APPOINTMENT (OUTPATIENT)
Age: 58
End: 2023-07-06

## 2023-07-09 ENCOUNTER — TRANSCRIPTION ENCOUNTER (OUTPATIENT)
Age: 58
End: 2023-07-09

## 2023-07-10 ENCOUNTER — TRANSCRIPTION ENCOUNTER (OUTPATIENT)
Age: 58
End: 2023-07-10

## 2023-07-11 ENCOUNTER — NON-APPOINTMENT (OUTPATIENT)
Age: 58
End: 2023-07-11

## 2023-08-23 ENCOUNTER — NON-APPOINTMENT (OUTPATIENT)
Age: 58
End: 2023-08-23

## 2023-08-23 ENCOUNTER — APPOINTMENT (OUTPATIENT)
Dept: HEPATOLOGY | Facility: CLINIC | Age: 58
End: 2023-08-23
Payer: COMMERCIAL

## 2023-08-23 VITALS
OXYGEN SATURATION: 100 % | DIASTOLIC BLOOD PRESSURE: 69 MMHG | BODY MASS INDEX: 30.03 KG/M2 | HEART RATE: 56 BPM | WEIGHT: 234 LBS | SYSTOLIC BLOOD PRESSURE: 118 MMHG | RESPIRATION RATE: 14 BRPM | HEIGHT: 74 IN | TEMPERATURE: 97.6 F

## 2023-08-23 PROCEDURE — 99214 OFFICE O/P EST MOD 30 MIN: CPT

## 2023-08-27 NOTE — PHYSICAL EXAM
[General Appearance - Alert] : alert [General Appearance - In No Acute Distress] : in no acute distress [Sclera] : the sclera and conjunctiva were normal [PERRL With Normal Accommodation] : pupils were equal in size, round, and reactive to light [Outer Ear] : the ears and nose were normal in appearance [Examination Of The Oral Cavity] : the lips and gums were normal [Neck Appearance] : the appearance of the neck was normal [] : no respiratory distress [Respiration, Rhythm And Depth] : normal respiratory rhythm and effort [Heart Sounds] : normal S1 and S2 [Murmurs] : no murmurs [Abdomen Soft] : soft [Abdomen Tenderness] : non-tender [Abdomen Mass (___ Cm)] : no abdominal mass palpated [Musculoskeletal - Swelling] : no joint swelling seen [Motor Tone] : muscle strength and tone were normal [Sensation] : the sensory exam was normal to light touch and pinprick [No Focal Deficits] : no focal deficits [Oriented To Time, Place, And Person] : oriented to person, place, and time [Impaired Insight] : insight and judgment were intact [Scleral Icterus] : No Scleral Icterus [Spider Angioma] : No spider angioma(s) were observed [Abdominal  Ascites] : no ascites [Asterixis] : no asterixis observed [Jaundice] : No jaundice [Depression] : no depression [Hallucinations] : ~T no ~M hallucinations [FreeTextEntry1] : venous stasis changes of right lower extremity

## 2023-08-27 NOTE — ASSESSMENT
[FreeTextEntry1] : Patient: 58-year-old gentleman with history of alcohol use disorder and alcohol associated cirrhosis of the liver with decompensation in the form of ascites and bilateral lower extremity edema, now improved on diuretics. Prior history of jaundice which appears to have resolved over time. He is abstinent from alcohol since October 31, 2021.  #1. Cirrhosis of the liver I engaged in a comprehensive discussion with the patient about cirrhosis, outlining its implications and natural progression. I elaborated on the risks of developing complications like esophageal varices with or without bleeding, hepatic encephalopathy, ascites, hepatocellular carcinoma, and liver failure. I emphasized that disease progression might necessitate an evaluation for a liver transplant. To screen for liver cancer, I conveyed the importance of undergoing imaging every six months. MR 7/23 cirrhosis, mild ascites, no suspicious liver lesions.   #2. Ascites Currently taking Lasix 80 mg twice daily (40 mg 2 tabs bid) and eplerenone 150 mg daily. Stopped aldactone due to gynecomastia. The regimen will be kept. Counseled patient to adhere to a low sodium (<2 grams of sodium per day) diet by: avoiding adding any salt to meals (removing the salt shaker from the table); eliminating salty foods from his diet; eating more home-cooked meals; choosing fresh or frozen, not canned, vegetables and fruits; and reading ingredient and food labels to choose low sodium foods.  #3. Esophageal varices Patient had endoscopy in December 2021 which revealed small grade 1 esophageal varices. Does not require beta-blocker therapy at this time. Last EGD was Dec 2021: Grade I esophageal varices. Repeat planned for 12/23 with Dr. Maya.  #4. Portal vein thrombosis Ultrasound of the abdomen from May 13, 2022 revealed central portion of the portal vein ruth the confluence at the site of the questionable thrombus on prior CAT scan was not well visualized. MR Abdomen 11/9/22: Cirrhosis with portal hypertension. No evidence of HCC or portal venous thrombosis. Small volume ascites.Eliquis was discontinued on 12/19/22.  US abdomen from Feb 26, 2023- patent portal and hepatic vasculature.  #5. Colon cancer screening Colonoscopy performed in March 2022 and was normal. Repeat in 10 years.  #6. Alcohol use disorder Patient was enrolled in the [facility] program (> 12 months). Now has been discharged. Maintaining sobriety.   #7. Transplant candidacy Patient is an excellent candidate for potential evaluation for liver transplantation. Clinically he appears to be improving with alcohol abstinence. Improved MELD Na Unfortunately, unable to evaluate for LT at [facility] due to high copay. Need to go to a [facility] facility. Currently being evaluated at [facility] (not listed due to low MELD score patient)  Follow-up:  Return to hepatology clinic in about 3 months.

## 2023-08-27 NOTE — HISTORY OF PRESENT ILLNESS
[de-identified] : Mr. Elan Newton, a 58-year-old white male, presented to the hepatology clinic on February 17, 2022, referred by Alverto Maya, gastroenterologist, and having been a patient of Barry Melendez. He was diagnosed with alcohol-associated cirrhosis of the liver with decompensation in the form of ascites and bilateral lower extremity edema. Mr. Newton had a long history of alcohol use disorder and had been drinking for more than 30 years, mostly beer, about 4-6 beers a day. He quit his drinking on October 31, 2021, after becoming sick with progressive abdominal distension and lower extremity swelling. He was admitted to [facility], where he was diagnosed with bacteremia, which was treated with IV antibiotics. During his hospitalization, he was diagnosed with portal vein thrombosis, for which he was placed on Eliquis. He reported having two large volume paracentesis, the last one being on December 30, 2021, with the removal of about 30 L of ascitic fluid. He was on Lasix 40 mg daily and Aldactone 100 mg daily, with complete resolution of ascites, although still had some bilateral lower extremity edema, which has been better over time.    Mr. Newton's family history was significant for breast cancer in his mother, and his father passed away at the age of 95. There was no family history of liver disease. 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    On January 5, 2022, his recent blood test results showed a white cell count of 5.09, hemoglobin 13.6, per deciliter, platelet count 75,000. CMP revealed a serum sodium 131, potassium 5.0. Serum creatinine was 0.84 mg/dL. Liver test revealed total bilirubin 2.2 mg/dL, AST 69, ALT 40, alkaline phosphatase 131. INR at that time was 1.81. His calculated MELD sodium score at that time was 20. He had an upper endoscopy on December 30, 2021, that revealed grade 1 esophageal varices. Mild gastritis was noted, and no biopsies were performed.    During his follow-up appointment on March 17, 2022, Mr. Newton reported that he had been doing well since his last clinic visit. He remained on Lasix 40 mg daily and Aldactone 100 mg daily, with excellent control of his ascites and minimal lower extremity edema. He remained on Eliquis 5 mg daily for his portal vein thrombosis. He was awaiting an ultrasound of the abdomen for follow-up evaluation. He had not yet joined an alcohol relapse prevention program but mentioned that he had been trying to reach out to [facility] addiction program and has left several messages but has not gotten back an answer from them.    On June 21, 2022, the patient was recently hospitalized at [facility] after being transferred from [facility] due to high MELD Na score and severe hyponatremia. The patient was taking Lasix 40 mg and Aldactone 150 mg PO qd, Eliquis 5 mg PO qd, and Cipro 500 mg PO qd. A CT scan showed cirrhosis and portal hypertension without evidence for HCC. Moderate to large ascites were present, and short segment nonvisualization versus narrowing of the main portal vein may be related to chronic thrombosis. However, the patient was unable to evaluate for LT at [facility] due to high copay.    On October 25, 2022, the patient came for follow-up and reported feeling well. He had recently undergone strangulated umbilical hernia repair on Oct 20, 2022. The patient also reported that his last paracentesis was in June 1, 2022. He complained of discoloration and swelling in bilateral LE, with the right side being worse than the left. Additionally, he experienced tingling at the base of his right foot for the past year.    On February 1, 2023, the patient came for follow-up and reported feeling well, except for weight gain which he was unsure if it was fluid or fat. His LE edema had worsened, and he had increased his furosemide to 80mg daily. The patient denied abdominal pain, N/V/C/D, pruritus. Labs reviewed from Oct 25, 2022, showed a hemoglobin level of 13, platelet count of 56, INR of 1.54, sodium level of 137, potassium level of 5.2, creatinine level of 0.91, TBili level of 1.    On May 3, 2023, patient returns for a follow up. Doing well. No ascites or edema. Low MELD score.Labs from April 14, 2023.  Today, the patient presents for a follow up. He overall feels well.  We reviewed his blood test results from April 14, 2023 that revealed serum creatinine 0.93 mg/dL.  LFTs revealed total bilirubin 1.8 mg/dL, AST 40, ALT 22, alkaline phosphatase 96 units/L.  Total serum protein was 5.4 grams per deciliter and albumin level 1.5 g/dL.  A magnetic resonance (MR) scan was conducted on July 23, 2023, revealing the presence of cirrhosis and portal hypertension. No indications of a mass were identified. The patient reports an overall state of well-being. A scheduled esophagogastroduodenoscopy (EGD) with Dr. Maya is planned for December. The patient denies experiencing abdominal pain, confusion, brain fog, melena, hematochezia, fever, or chills. He has no specific complaints today and continues to maintain abstinence from alcohol. He is currently prescribed Lasix at a dosage of 80 mg twice daily and eplerenone at 100 mg in the morning and 50 mg in the evening.

## 2023-09-07 NOTE — ED PROVIDER NOTE - CADM POA PRESS ULCER
Vital Signs Last 24 Hrs  T(C): 36.8 (09-07-23 @ 08:30), Max: 36.8 (09-07-23 @ 08:30)  T(F): 98.3 (09-07-23 @ 08:30), Max: 98.3 (09-07-23 @ 08:30)  HR: 74 (09-07-23 @ 08:30) (74 - 74)  BP: 114/60 (09-07-23 @ 08:30) (114/60 - 114/60)  BP(mean): --  RR: 16 (09-07-23 @ 08:30) (16 - 16)  SpO2: --    Orthostatic VS  09-06-23 @ 09:00  Lying BP: --/-- HR: --  Sitting BP: 117/68 HR: 96  Standing BP: --/-- HR: --  Site: --  Mode: --   No

## 2023-10-06 ENCOUNTER — RX RENEWAL (OUTPATIENT)
Age: 58
End: 2023-10-06

## 2023-10-06 RX ORDER — SULFAMETHOXAZOLE AND TRIMETHOPRIM 800; 160 MG/1; MG/1
800-160 TABLET ORAL DAILY
Qty: 90 | Refills: 3 | Status: ACTIVE | COMMUNITY
Start: 2022-12-19 | End: 1900-01-01

## 2023-11-07 LAB
ALBUMIN SERPL ELPH-MCNC: 3.6 G/DL
ALP BLD-CCNC: 110 U/L
ALPHA-1-FETOPROTEIN-L3: 4.7 %
ALPHA-1-FETOPROTEIN: 7.1 NG/ML
ALT SERPL-CCNC: 23 U/L
ANION GAP SERPL CALC-SCNC: 9 MMOL/L
AST SERPL-CCNC: 49 U/L
BILIRUB SERPL-MCNC: 1.5 MG/DL
BUN SERPL-MCNC: 10 MG/DL
CALCIUM SERPL-MCNC: 8.4 MG/DL
CHLORIDE SERPL-SCNC: 103 MMOL/L
CO2 SERPL-SCNC: 25 MMOL/L
CREAT SERPL-MCNC: 0.92 MG/DL
EGFR: 95 ML/MIN/1.73M2
GLUCOSE SERPL-MCNC: 153 MG/DL
INR PPP: 1.25 RATIO
POTASSIUM SERPL-SCNC: 4.1 MMOL/L
PROT SERPL-MCNC: 5.5 G/DL
PT BLD: 14 SEC
SODIUM SERPL-SCNC: 137 MMOL/L

## 2023-11-08 NOTE — BRIEF OPERATIVE NOTE - ESTIMATED BLOOD LOSS
2023  EMPLOYEE INFORMATION: EMPLOYER INFORMATION:   NAME: Amaya Martínez [employer]   : 1967 [phone]    DATE OF INJURY/EVENT: 10/31/2023           [facility] [place] [facility]   Treating Provider: Lea Langston PA-C  Time In:  3:10 PM Time Out:  4:04 PM      DIAGNOSIS:   1. Left knee injury, subsequent encounter    2. Right hand pain      STATUS: This injury is determined to be WORK RELATED.    RETURN TO WORK:Employee may return to work with restrictions.   Return Date: 2023            RESTRICTIONS: Restrictions are to be followed at work and at home.  Restrictions are in effect until next follow-up visit.  Lighter administrative tasks.   Use crutches, partial weight bearing to full weight bearing as tolerated.   Wear knee brace for comfort.   No lifting more than 5lbs.   No squatting, kneeling, ladders.   Limit use of stairs if able.   Be allowed to ice for 15 minutes every 2 hours if needed.   Attend therapy exercises.      TREATMENT PLAN:   Medications for this injury/condition: Take already prescribed Celebrex for other medical condition.   May take over the counter Tylenol as needed.   Referral/Consult: None  Diagnostic Testing: None       Instructions: Rest, ice, compression, elevation.       NEXT RETURN VISIT:    2023 at 3:30pm in clinic with JEMIMA Sommer    Thank you for the privilege of providing medical care for this injury/condition.  If there are any questions, please call the occupational health clinic at Dept: [phone].      Electronically signed on 2023 at 4:04 PM by:   Lea Langston PA-C   [facility]      
0
0

## 2024-01-03 ENCOUNTER — APPOINTMENT (OUTPATIENT)
Dept: ULTRASOUND IMAGING | Facility: CLINIC | Age: 59
End: 2024-01-03
Payer: COMMERCIAL

## 2024-01-03 ENCOUNTER — OUTPATIENT (OUTPATIENT)
Dept: OUTPATIENT SERVICES | Facility: HOSPITAL | Age: 59
LOS: 1 days | End: 2024-01-03
Payer: COMMERCIAL

## 2024-01-03 DIAGNOSIS — K70.31 ALCOHOLIC CIRRHOSIS OF LIVER WITH ASCITES: ICD-10-CM

## 2024-01-03 DIAGNOSIS — Z90.89 ACQUIRED ABSENCE OF OTHER ORGANS: Chronic | ICD-10-CM

## 2024-01-03 DIAGNOSIS — Z98.890 OTHER SPECIFIED POSTPROCEDURAL STATES: Chronic | ICD-10-CM

## 2024-01-03 PROCEDURE — 76700 US EXAM ABDOM COMPLETE: CPT

## 2024-01-03 PROCEDURE — 76700 US EXAM ABDOM COMPLETE: CPT | Mod: 26

## 2024-01-04 ENCOUNTER — RX RENEWAL (OUTPATIENT)
Age: 59
End: 2024-01-04

## 2024-01-09 ENCOUNTER — APPOINTMENT (OUTPATIENT)
Dept: MRI IMAGING | Facility: CLINIC | Age: 59
End: 2024-01-09
Payer: COMMERCIAL

## 2024-01-09 ENCOUNTER — OUTPATIENT (OUTPATIENT)
Dept: OUTPATIENT SERVICES | Facility: HOSPITAL | Age: 59
LOS: 1 days | End: 2024-01-09
Payer: COMMERCIAL

## 2024-01-09 DIAGNOSIS — K70.31 ALCOHOLIC CIRRHOSIS OF LIVER WITH ASCITES: ICD-10-CM

## 2024-01-09 DIAGNOSIS — Z98.890 OTHER SPECIFIED POSTPROCEDURAL STATES: Chronic | ICD-10-CM

## 2024-01-09 DIAGNOSIS — Z90.89 ACQUIRED ABSENCE OF OTHER ORGANS: Chronic | ICD-10-CM

## 2024-01-09 PROCEDURE — 74183 MRI ABD W/O CNTR FLWD CNTR: CPT

## 2024-01-09 PROCEDURE — A9585: CPT

## 2024-01-09 PROCEDURE — 74183 MRI ABD W/O CNTR FLWD CNTR: CPT | Mod: 26

## 2024-01-23 ENCOUNTER — LABORATORY RESULT (OUTPATIENT)
Age: 59
End: 2024-01-23

## 2024-01-23 ENCOUNTER — APPOINTMENT (OUTPATIENT)
Dept: HEPATOLOGY | Facility: CLINIC | Age: 59
End: 2024-01-23
Payer: COMMERCIAL

## 2024-01-23 VITALS
BODY MASS INDEX: 27.59 KG/M2 | HEIGHT: 74 IN | DIASTOLIC BLOOD PRESSURE: 65 MMHG | HEART RATE: 66 BPM | TEMPERATURE: 97.4 F | WEIGHT: 215 LBS | SYSTOLIC BLOOD PRESSURE: 112 MMHG | OXYGEN SATURATION: 98 %

## 2024-01-23 PROCEDURE — 99214 OFFICE O/P EST MOD 30 MIN: CPT

## 2024-01-25 ENCOUNTER — OUTPATIENT (OUTPATIENT)
Dept: OUTPATIENT SERVICES | Facility: HOSPITAL | Age: 59
LOS: 1 days | Discharge: HOME | End: 2024-01-25

## 2024-01-25 VITALS
RESPIRATION RATE: 19 BRPM | OXYGEN SATURATION: 100 % | HEIGHT: 74 IN | HEART RATE: 60 BPM | TEMPERATURE: 99 F | DIASTOLIC BLOOD PRESSURE: 64 MMHG | WEIGHT: 214.95 LBS | SYSTOLIC BLOOD PRESSURE: 111 MMHG

## 2024-01-25 DIAGNOSIS — K70.31 ALCOHOLIC CIRRHOSIS OF LIVER WITH ASCITES: ICD-10-CM

## 2024-01-25 DIAGNOSIS — Z98.890 OTHER SPECIFIED POSTPROCEDURAL STATES: Chronic | ICD-10-CM

## 2024-01-25 DIAGNOSIS — Z90.89 ACQUIRED ABSENCE OF OTHER ORGANS: Chronic | ICD-10-CM

## 2024-01-25 RX ORDER — EPLERENONE 50 MG/1
1 TABLET, FILM COATED ORAL
Refills: 0 | DISCHARGE

## 2024-01-25 RX ORDER — PREGABALIN 225 MG/1
2 CAPSULE ORAL
Refills: 0 | DISCHARGE

## 2024-01-25 RX ORDER — ATORVASTATIN CALCIUM 80 MG/1
1 TABLET, FILM COATED ORAL
Refills: 0 | DISCHARGE

## 2024-01-25 RX ORDER — CHOLECALCIFEROL (VITAMIN D3) 125 MCG
1 CAPSULE ORAL
Refills: 0 | DISCHARGE

## 2024-01-25 NOTE — ASU PREOP CHECKLIST - VERIFY SURGICAL SITE/SIDE WITH PATIENT
53 yo male active heroin use with chronic back pain, kidney cyst a/w acute on chronic back pain with sciatica with difficulty ambulating due to pain 8/4. patient found to have s. aureus bacteremia on cefazolin iv. nephrology consulted for ARNOLDO.     ARNOLDO  peaked 1.79   ARNOLDO likely prerenal on NSAIDs and lisinopril  renal function improved with IVF  off ibuprofen.   renal us done showed left renal cyst  currently on enalapril 20 bid and ketorolac iv for pain  off IVF  renal function stable  monitor bmp, urine output     HTN  controlled  monitor bmp and bp    Hep C  f/u GI  outpatient treatment    Proteinuria  mild  p/c ratio <300, Monitor  hep c + done

## 2024-01-25 NOTE — ASU PATIENT PROFILE, ADULT - FALL HARM RISK - UNIVERSAL INTERVENTIONS
Bed in lowest position, wheels locked, appropriate side rails in place/Call bell, personal items and telephone in reach/Instruct patient to call for assistance before getting out of bed or chair/Non-slip footwear when patient is out of bed/Joes to call system/Physically safe environment - no spills, clutter or unnecessary equipment/Purposeful Proactive Rounding/Room/bathroom lighting operational, light cord in reach

## 2024-01-25 NOTE — ASU PATIENT PROFILE, ADULT - NSICDXPASTSURGICALHX_GEN_ALL_CORE_FT
PAST SURGICAL HISTORY:  H/O umbilical hernia repair     History of tonsillectomy and adenoidectomy as child    S/P abdominal paracentesis

## 2024-01-25 NOTE — ASU PATIENT PROFILE, ADULT - MEDICATIONS BROUGHT TO HOSPITAL, PROFILE
Patient originally had a physical in April and we had to reschedule because PMJ was out of the office.  We rescheduled it for 5/5 and patient just found out that he has to go out of town for work next week and will not be able to make it.  He doesn't want to wait until November for his physical.  When can patient be worked in in either May or early June for a physical?   no

## 2024-01-26 ENCOUNTER — NON-APPOINTMENT (OUTPATIENT)
Age: 59
End: 2024-01-26

## 2024-01-31 DIAGNOSIS — K21.00 GASTRO-ESOPHAGEAL REFLUX DISEASE WITH ESOPHAGITIS, WITHOUT BLEEDING: ICD-10-CM

## 2024-01-31 DIAGNOSIS — I85.10 SECONDARY ESOPHAGEAL VARICES WITHOUT BLEEDING: ICD-10-CM

## 2024-01-31 DIAGNOSIS — K31.89 OTHER DISEASES OF STOMACH AND DUODENUM: ICD-10-CM

## 2024-01-31 DIAGNOSIS — K74.60 UNSPECIFIED CIRRHOSIS OF LIVER: ICD-10-CM

## 2024-01-31 DIAGNOSIS — E78.5 HYPERLIPIDEMIA, UNSPECIFIED: ICD-10-CM

## 2024-01-31 DIAGNOSIS — K76.6 PORTAL HYPERTENSION: ICD-10-CM

## 2024-02-04 ENCOUNTER — TRANSCRIPTION ENCOUNTER (OUTPATIENT)
Age: 59
End: 2024-02-04

## 2024-02-04 LAB
ACARBOXYPROTHROMBIN SERPL-MCNC: 0.4 NG/ML
ALBUMIN SERPL ELPH-MCNC: 3.7 G/DL
ALP BLD-CCNC: 112 U/L
ALPHA-1-FETOPROTEIN-L3: 3.9 %
ALPHA-1-FETOPROTEIN: 9.8 NG/ML
ALT SERPL-CCNC: 27 U/L
ANION GAP SERPL CALC-SCNC: 11 MMOL/L
AST SERPL-CCNC: 47 U/L
BASOPHILS # BLD AUTO: 0.05 K/UL
BASOPHILS NFR BLD AUTO: 1.5 %
BILIRUB SERPL-MCNC: 1.6 MG/DL
BUN SERPL-MCNC: 14 MG/DL
CALCIUM SERPL-MCNC: 8.8 MG/DL
CHLORIDE SERPL-SCNC: 103 MMOL/L
CO2 SERPL-SCNC: 24 MMOL/L
CREAT SERPL-MCNC: 1.01 MG/DL
EGFR: 85 ML/MIN/1.73M2
EOSINOPHIL # BLD AUTO: 0.09 K/UL
EOSINOPHIL NFR BLD AUTO: 2.8 %
GLUCOSE SERPL-MCNC: 95 MG/DL
HCT VFR BLD CALC: 41.7 %
HGB BLD-MCNC: 14 G/DL
IMM GRANULOCYTES NFR BLD AUTO: 0.3 %
INR PPP: 1.19 RATIO
LYMPHOCYTES # BLD AUTO: 1.07 K/UL
LYMPHOCYTES NFR BLD AUTO: 32.7 %
MAN DIFF?: NORMAL
MCHC RBC-ENTMCNC: 32.8 PG
MCHC RBC-ENTMCNC: 33.6 GM/DL
MCV RBC AUTO: 97.7 FL
MONOCYTES # BLD AUTO: 0.37 K/UL
MONOCYTES NFR BLD AUTO: 11.3 %
NEUTROPHILS # BLD AUTO: 1.68 K/UL
NEUTROPHILS NFR BLD AUTO: 51.4 %
PLATELET # BLD AUTO: 45 K/UL
POTASSIUM SERPL-SCNC: 4.4 MMOL/L
PROT SERPL-MCNC: 5.9 G/DL
PT BLD: 13.4 SEC
RBC # BLD: 4.27 M/UL
RBC # FLD: 14.6 %
SODIUM SERPL-SCNC: 138 MMOL/L
WBC # FLD AUTO: 3.27 K/UL

## 2024-03-05 NOTE — DISCHARGE NOTE PROVIDER - NSDCFUSCHEDAPPT_GEN_ALL_CORE_FT
Indu Butt  [facility]  Hepatology [address] [place] [address]   Scheduled Appointment: 06/21/2022    
Yes

## 2024-03-29 RX ORDER — FUROSEMIDE 40 MG/1
40 TABLET ORAL
Qty: 360 | Refills: 1 | Status: ACTIVE | COMMUNITY
Start: 2022-03-17 | End: 1900-01-01

## 2024-04-24 ENCOUNTER — APPOINTMENT (OUTPATIENT)
Dept: HEPATOLOGY | Facility: CLINIC | Age: 59
End: 2024-04-24
Payer: COMMERCIAL

## 2024-04-24 VITALS
HEART RATE: 72 BPM | OXYGEN SATURATION: 98 % | RESPIRATION RATE: 14 BRPM | SYSTOLIC BLOOD PRESSURE: 114 MMHG | HEIGHT: 74 IN | DIASTOLIC BLOOD PRESSURE: 72 MMHG | TEMPERATURE: 96.3 F | WEIGHT: 212 LBS | BODY MASS INDEX: 27.21 KG/M2

## 2024-04-24 DIAGNOSIS — I85.00 ESOPHAGEAL VARICES W/OUT BLEEDING: ICD-10-CM

## 2024-04-24 DIAGNOSIS — R18.8 OTHER ASCITES: ICD-10-CM

## 2024-04-24 DIAGNOSIS — R60.0 LOCALIZED EDEMA: ICD-10-CM

## 2024-04-24 DIAGNOSIS — I81 PORTAL VEIN THROMBOSIS: ICD-10-CM

## 2024-04-24 PROCEDURE — 99214 OFFICE O/P EST MOD 30 MIN: CPT

## 2024-04-27 PROBLEM — R60.0 LOWER EXTREMITY EDEMA: Status: ACTIVE | Noted: 2022-02-17

## 2024-04-27 PROBLEM — I85.00 ESOPHAGEAL VARICES DETERMINED BY ENDOSCOPY: Status: ACTIVE | Noted: 2022-02-17

## 2024-04-27 PROBLEM — R18.8 ASCITES OF LIVER: Status: ACTIVE | Noted: 2022-02-17

## 2024-04-27 PROBLEM — I81 PORTAL VEIN THROMBOSIS: Status: ACTIVE | Noted: 2022-03-17

## 2024-05-29 NOTE — DISCHARGE NOTE PROVIDER - NSDCACTIVITY_GEN_ALL_CORE
Please advise, I could not locate anything in chart stating this was discontinued.    No restrictions

## 2024-06-20 RX ORDER — EPLERENONE 50 MG/1
50 TABLET, COATED ORAL
Qty: 90 | Refills: 5 | Status: ACTIVE | COMMUNITY
Start: 2023-02-01 | End: 1900-01-01

## 2024-06-27 DIAGNOSIS — K70.31 ALCOHOLIC CIRRHOSIS OF LIVER WITH ASCITES: ICD-10-CM

## 2024-07-02 LAB
INR PPP: 1.25 RATIO
PT BLD: 14.2 SEC

## 2024-07-03 ENCOUNTER — OUTPATIENT (OUTPATIENT)
Dept: OUTPATIENT SERVICES | Facility: HOSPITAL | Age: 59
LOS: 1 days | End: 2024-07-03
Payer: COMMERCIAL

## 2024-07-03 ENCOUNTER — APPOINTMENT (OUTPATIENT)
Dept: ULTRASOUND IMAGING | Facility: CLINIC | Age: 59
End: 2024-07-03

## 2024-07-03 ENCOUNTER — APPOINTMENT (OUTPATIENT)
Dept: CT IMAGING | Facility: CLINIC | Age: 59
End: 2024-07-03
Payer: COMMERCIAL

## 2024-07-03 DIAGNOSIS — Z00.8 ENCOUNTER FOR OTHER GENERAL EXAMINATION: ICD-10-CM

## 2024-07-03 DIAGNOSIS — Z98.890 OTHER SPECIFIED POSTPROCEDURAL STATES: Chronic | ICD-10-CM

## 2024-07-03 DIAGNOSIS — Z90.89 ACQUIRED ABSENCE OF OTHER ORGANS: Chronic | ICD-10-CM

## 2024-07-03 PROCEDURE — 76700 US EXAM ABDOM COMPLETE: CPT

## 2024-07-03 PROCEDURE — 71275 CT ANGIOGRAPHY CHEST: CPT

## 2024-07-03 PROCEDURE — 71275 CT ANGIOGRAPHY CHEST: CPT | Mod: 26

## 2024-07-03 PROCEDURE — 76700 US EXAM ABDOM COMPLETE: CPT | Mod: 26

## 2024-07-17 ENCOUNTER — APPOINTMENT (OUTPATIENT)
Dept: HEPATOLOGY | Facility: CLINIC | Age: 59
End: 2024-07-17
Payer: COMMERCIAL

## 2024-07-17 VITALS
DIASTOLIC BLOOD PRESSURE: 66 MMHG | HEART RATE: 54 BPM | BODY MASS INDEX: 26.69 KG/M2 | SYSTOLIC BLOOD PRESSURE: 119 MMHG | OXYGEN SATURATION: 97 % | HEIGHT: 74 IN | RESPIRATION RATE: 14 BRPM | TEMPERATURE: 96.7 F | WEIGHT: 208 LBS

## 2024-07-17 DIAGNOSIS — K70.31 ALCOHOLIC CIRRHOSIS OF LIVER WITH ASCITES: ICD-10-CM

## 2024-07-17 DIAGNOSIS — R60.0 LOCALIZED EDEMA: ICD-10-CM

## 2024-07-17 DIAGNOSIS — R18.8 OTHER ASCITES: ICD-10-CM

## 2024-07-17 DIAGNOSIS — I81 PORTAL VEIN THROMBOSIS: ICD-10-CM

## 2024-07-17 DIAGNOSIS — I85.00 ESOPHAGEAL VARICES W/OUT BLEEDING: ICD-10-CM

## 2024-07-17 PROCEDURE — 99214 OFFICE O/P EST MOD 30 MIN: CPT

## 2024-07-19 LAB
ESTIMATED AVERAGE GLUCOSE: 100 MG/DL
HBA1C MFR BLD HPLC: 5.1 %

## 2024-09-24 ENCOUNTER — RX RENEWAL (OUTPATIENT)
Age: 59
End: 2024-09-24

## 2024-10-28 ENCOUNTER — TRANSCRIPTION ENCOUNTER (OUTPATIENT)
Age: 59
End: 2024-10-28

## 2024-10-28 ENCOUNTER — NON-APPOINTMENT (OUTPATIENT)
Age: 59
End: 2024-10-28

## 2024-10-28 DIAGNOSIS — K70.31 ALCOHOLIC CIRRHOSIS OF LIVER WITH ASCITES: ICD-10-CM

## 2024-12-05 ENCOUNTER — RX RENEWAL (OUTPATIENT)
Age: 59
End: 2024-12-05

## 2024-12-16 ENCOUNTER — RX RENEWAL (OUTPATIENT)
Age: 59
End: 2024-12-16

## 2025-01-13 NOTE — SBIRT NOTE ADULT - NSSBIRTDRGUSEDOTHTHAN_GEN_A_CORE
This is a 73-year-old male seen in consultation for history of tubular adenomas.  Last colonoscopy was in 2019.  He is doing well without complaints no change in bowel habits abdominal pain or bleeding.  No family history of colon cancer.  He takes a statin and fluoxetine. Referred by Dr Garcia.
No

## 2025-01-16 ENCOUNTER — APPOINTMENT (OUTPATIENT)
Dept: ULTRASOUND IMAGING | Facility: CLINIC | Age: 60
End: 2025-01-16
Payer: COMMERCIAL

## 2025-01-16 ENCOUNTER — OUTPATIENT (OUTPATIENT)
Dept: OUTPATIENT SERVICES | Facility: HOSPITAL | Age: 60
LOS: 1 days | End: 2025-01-16
Payer: COMMERCIAL

## 2025-01-16 DIAGNOSIS — Z98.890 OTHER SPECIFIED POSTPROCEDURAL STATES: Chronic | ICD-10-CM

## 2025-01-16 DIAGNOSIS — Z00.8 ENCOUNTER FOR OTHER GENERAL EXAMINATION: ICD-10-CM

## 2025-01-16 DIAGNOSIS — K70.31 ALCOHOLIC CIRRHOSIS OF LIVER WITH ASCITES: ICD-10-CM

## 2025-01-16 DIAGNOSIS — Z90.89 ACQUIRED ABSENCE OF OTHER ORGANS: Chronic | ICD-10-CM

## 2025-01-16 PROCEDURE — 93975 VASCULAR STUDY: CPT | Mod: 26

## 2025-01-16 PROCEDURE — 76700 US EXAM ABDOM COMPLETE: CPT | Mod: 26,59

## 2025-01-16 PROCEDURE — 93975 VASCULAR STUDY: CPT

## 2025-01-16 PROCEDURE — 76700 US EXAM ABDOM COMPLETE: CPT

## 2025-01-24 DIAGNOSIS — K70.31 ALCOHOLIC CIRRHOSIS OF LIVER WITH ASCITES: ICD-10-CM

## 2025-02-06 ENCOUNTER — NON-APPOINTMENT (OUTPATIENT)
Age: 60
End: 2025-02-06

## 2025-02-19 ENCOUNTER — APPOINTMENT (OUTPATIENT)
Dept: HEPATOLOGY | Facility: CLINIC | Age: 60
End: 2025-02-19
Payer: COMMERCIAL

## 2025-02-19 VITALS
HEIGHT: 74 IN | TEMPERATURE: 97.5 F | RESPIRATION RATE: 14 BRPM | BODY MASS INDEX: 26.69 KG/M2 | WEIGHT: 208 LBS | DIASTOLIC BLOOD PRESSURE: 62 MMHG | HEART RATE: 51 BPM | OXYGEN SATURATION: 98 % | SYSTOLIC BLOOD PRESSURE: 105 MMHG

## 2025-02-19 DIAGNOSIS — K70.31 ALCOHOLIC CIRRHOSIS OF LIVER WITH ASCITES: ICD-10-CM

## 2025-02-19 DIAGNOSIS — G62.9 POLYNEUROPATHY, UNSPECIFIED: ICD-10-CM

## 2025-02-19 DIAGNOSIS — R18.8 OTHER ASCITES: ICD-10-CM

## 2025-02-19 DIAGNOSIS — R23.4 CHANGES IN SKIN TEXTURE: ICD-10-CM

## 2025-02-19 DIAGNOSIS — I85.00 ESOPHAGEAL VARICES W/OUT BLEEDING: ICD-10-CM

## 2025-02-19 PROCEDURE — 99214 OFFICE O/P EST MOD 30 MIN: CPT

## 2025-02-19 RX ORDER — GABAPENTIN 100 MG/1
100 CAPSULE ORAL 3 TIMES DAILY
Qty: 90 | Refills: 1 | Status: ACTIVE | COMMUNITY
Start: 2025-02-19 | End: 1900-01-01

## 2025-02-28 ENCOUNTER — APPOINTMENT (OUTPATIENT)
Dept: CT IMAGING | Facility: CLINIC | Age: 60
End: 2025-02-28
Payer: COMMERCIAL

## 2025-02-28 ENCOUNTER — OUTPATIENT (OUTPATIENT)
Dept: OUTPATIENT SERVICES | Facility: HOSPITAL | Age: 60
LOS: 1 days | End: 2025-02-28
Payer: COMMERCIAL

## 2025-02-28 DIAGNOSIS — Z90.89 ACQUIRED ABSENCE OF OTHER ORGANS: Chronic | ICD-10-CM

## 2025-02-28 DIAGNOSIS — Z98.890 OTHER SPECIFIED POSTPROCEDURAL STATES: Chronic | ICD-10-CM

## 2025-02-28 DIAGNOSIS — K70.31 ALCOHOLIC CIRRHOSIS OF LIVER WITH ASCITES: ICD-10-CM

## 2025-02-28 PROCEDURE — 74160 CT ABDOMEN W/CONTRAST: CPT

## 2025-02-28 PROCEDURE — 74160 CT ABDOMEN W/CONTRAST: CPT | Mod: 26

## 2025-03-11 ENCOUNTER — RX RENEWAL (OUTPATIENT)
Age: 60
End: 2025-03-11

## 2025-03-26 NOTE — PATIENT PROFILE ADULT - NSFALLSECTIONLABEL_GEN_A_CORE
Detail Level: Zone Length Of Therapy: 1.5 years Patient Reported Weight(Optional But Include Units): 340 Add High Risk Medication Management Associated Diagnosis?: No .

## 2025-04-04 ENCOUNTER — RX RENEWAL (OUTPATIENT)
Age: 60
End: 2025-04-04

## 2025-04-14 NOTE — PATIENT PROFILE ADULT - FUNCTIONAL ASSESSMENT - BASIC MOBILITY SECTION LABEL
Rx Refill Note  Requested Prescriptions     Pending Prescriptions Disp Refills    fluconazole (DIFLUCAN) 200 MG tablet 6 tablet 1     Sig: Take 1 tablet by mouth Every Other Day.      Last office visit with prescribing clinician: 1/3/2025   Last telemedicine visit with prescribing clinician: Visit date not found   Next office visit with prescribing clinician: Visit date not found                         Would you like a call back once the refill request has been completed: [] Yes [] No    If the office needs to give you a call back, can they leave a voicemail: [] Yes [] No    Evelyne Barron MA  04/14/25, 11:42 CDT    
.

## 2025-05-22 ENCOUNTER — OUTPATIENT (OUTPATIENT)
Dept: OUTPATIENT SERVICES | Facility: HOSPITAL | Age: 60
LOS: 1 days | Discharge: HOME | End: 2025-05-22

## 2025-05-22 VITALS
DIASTOLIC BLOOD PRESSURE: 69 MMHG | RESPIRATION RATE: 11 BRPM | TEMPERATURE: 98 F | HEIGHT: 74 IN | WEIGHT: 203.93 LBS | HEART RATE: 48 BPM | OXYGEN SATURATION: 100 % | SYSTOLIC BLOOD PRESSURE: 114 MMHG

## 2025-05-22 DIAGNOSIS — K70.30 ALCOHOLIC CIRRHOSIS OF LIVER WITHOUT ASCITES: ICD-10-CM

## 2025-05-22 DIAGNOSIS — Z90.89 ACQUIRED ABSENCE OF OTHER ORGANS: Chronic | ICD-10-CM

## 2025-05-22 DIAGNOSIS — Z98.890 OTHER SPECIFIED POSTPROCEDURAL STATES: Chronic | ICD-10-CM

## 2025-05-22 RX ORDER — VITAMIN E (DL,TOCOPHERYL ACET) 22.5 MG/ML
0 DROPS ORAL
Refills: 0 | DISCHARGE

## 2025-05-22 RX ORDER — FOLIC ACID 1 MG/1
0 TABLET ORAL
Refills: 0 | DISCHARGE

## 2025-05-22 NOTE — ASU PATIENT PROFILE, ADULT - DOES PATIENT HAVE ADVANCE DIRECTIVE
Care taken over from Alessia JOHNSON. 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 Ly. Procedure site was marked by MD and verified using imaging guidance. Pt placed on monitor, prepped and draped in a sterile fashion. Vitals were taken every 5 minutes and remained stable during procedure (see doc flow sheet for results). CO2 waveform capnography was monitored and remained WNL throughout procedure. Report called to Mansi JOHNSON. Pt transported by edilson with RN to PACU.    No

## 2025-05-22 NOTE — ASU PATIENT PROFILE, ADULT - REASON FOR ADMISSION, PROFILE
egd Cephalexin Pregnancy And Lactation Text: This medication is Pregnancy Category B and considered safe during pregnancy.  It is also excreted in breast milk but can be used safely for shorter doses.

## 2025-05-28 DIAGNOSIS — E78.5 HYPERLIPIDEMIA, UNSPECIFIED: ICD-10-CM

## 2025-05-28 DIAGNOSIS — I85.10 SECONDARY ESOPHAGEAL VARICES WITHOUT BLEEDING: ICD-10-CM

## 2025-05-28 DIAGNOSIS — K21.00 GASTRO-ESOPHAGEAL REFLUX DISEASE WITH ESOPHAGITIS, WITHOUT BLEEDING: ICD-10-CM

## 2025-05-28 DIAGNOSIS — K31.89 OTHER DISEASES OF STOMACH AND DUODENUM: ICD-10-CM

## 2025-05-28 DIAGNOSIS — I85.00 ESOPHAGEAL VARICES WITHOUT BLEEDING: ICD-10-CM

## 2025-05-28 DIAGNOSIS — K76.6 PORTAL HYPERTENSION: ICD-10-CM

## 2025-06-05 ENCOUNTER — RX RENEWAL (OUTPATIENT)
Age: 60
End: 2025-06-05

## 2025-06-27 ENCOUNTER — RX RENEWAL (OUTPATIENT)
Age: 60
End: 2025-06-27

## 2025-08-20 ENCOUNTER — APPOINTMENT (OUTPATIENT)
Dept: HEPATOLOGY | Facility: CLINIC | Age: 60
End: 2025-08-20